# Patient Record
Sex: FEMALE | Race: ASIAN | NOT HISPANIC OR LATINO | Employment: UNEMPLOYED | URBAN - METROPOLITAN AREA
[De-identification: names, ages, dates, MRNs, and addresses within clinical notes are randomized per-mention and may not be internally consistent; named-entity substitution may affect disease eponyms.]

---

## 2018-06-11 ENCOUNTER — OFFICE VISIT - HEALTHEAST (OUTPATIENT)
Dept: FAMILY MEDICINE | Facility: CLINIC | Age: 58
End: 2018-06-11

## 2018-06-11 DIAGNOSIS — M54.50 CHRONIC RIGHT-SIDED LOW BACK PAIN WITHOUT SCIATICA: ICD-10-CM

## 2018-06-11 DIAGNOSIS — G89.29 CHRONIC RIGHT-SIDED LOW BACK PAIN WITHOUT SCIATICA: ICD-10-CM

## 2018-06-11 DIAGNOSIS — N12 PYELONEPHRITIS: ICD-10-CM

## 2018-06-11 LAB
ALBUMIN UR-MCNC: ABNORMAL MG/DL
APPEARANCE UR: CLEAR
BACTERIA #/AREA URNS HPF: ABNORMAL HPF
BILIRUB UR QL STRIP: NEGATIVE
COLOR UR AUTO: YELLOW
GLUCOSE UR STRIP-MCNC: NEGATIVE MG/DL
HGB UR QL STRIP: ABNORMAL
KETONES UR STRIP-MCNC: NEGATIVE MG/DL
LEUKOCYTE ESTERASE UR QL STRIP: ABNORMAL
NITRATE UR QL: NEGATIVE
PH UR STRIP: 5.5 [PH] (ref 5–8)
RBC #/AREA URNS AUTO: ABNORMAL HPF
SP GR UR STRIP: 1.01 (ref 1–1.03)
SQUAMOUS #/AREA URNS AUTO: ABNORMAL LPF
UROBILINOGEN UR STRIP-ACNC: ABNORMAL
WBC #/AREA URNS AUTO: ABNORMAL HPF

## 2018-06-11 ASSESSMENT — MIFFLIN-ST. JEOR: SCORE: 1285.75

## 2018-06-13 LAB — BACTERIA SPEC CULT: ABNORMAL

## 2018-08-08 ENCOUNTER — OFFICE VISIT - HEALTHEAST (OUTPATIENT)
Dept: FAMILY MEDICINE | Facility: CLINIC | Age: 58
End: 2018-08-08

## 2018-08-08 DIAGNOSIS — B02.9 SHINGLES: ICD-10-CM

## 2019-11-20 ENCOUNTER — OFFICE VISIT - HEALTHEAST (OUTPATIENT)
Dept: FAMILY MEDICINE | Facility: CLINIC | Age: 59
End: 2019-11-20

## 2019-11-20 DIAGNOSIS — N30.01 ACUTE CYSTITIS WITH HEMATURIA: ICD-10-CM

## 2019-11-20 LAB
ALBUMIN UR-MCNC: ABNORMAL MG/DL
APPEARANCE UR: ABNORMAL
BACTERIA #/AREA URNS HPF: ABNORMAL HPF
BILIRUB UR QL STRIP: NEGATIVE
COLOR UR AUTO: YELLOW
GLUCOSE UR STRIP-MCNC: NEGATIVE MG/DL
HGB UR QL STRIP: ABNORMAL
KETONES UR STRIP-MCNC: NEGATIVE MG/DL
LEUKOCYTE ESTERASE UR QL STRIP: ABNORMAL
NITRATE UR QL: NEGATIVE
PH UR STRIP: 5.5 [PH] (ref 5–8)
RBC #/AREA URNS AUTO: ABNORMAL HPF
SP GR UR STRIP: 1.01 (ref 1–1.03)
SQUAMOUS #/AREA URNS AUTO: ABNORMAL LPF
UROBILINOGEN UR STRIP-ACNC: ABNORMAL
WBC #/AREA URNS AUTO: ABNORMAL HPF

## 2019-11-20 RX ORDER — FLUOXETINE 40 MG/1
CAPSULE ORAL
Refills: 5 | Status: SHIPPED | COMMUNITY
Start: 2019-10-14 | End: 2024-04-14

## 2019-11-20 RX ORDER — TRAZODONE HYDROCHLORIDE 50 MG/1
TABLET, FILM COATED ORAL
Refills: 5 | Status: SHIPPED | COMMUNITY
Start: 2019-10-14 | End: 2024-04-14

## 2019-11-20 RX ORDER — LISINOPRIL 10 MG/1
TABLET ORAL
Refills: 1 | Status: SHIPPED | COMMUNITY
Start: 2019-10-14 | End: 2024-04-14

## 2019-11-23 ENCOUNTER — COMMUNICATION - HEALTHEAST (OUTPATIENT)
Dept: NURSING | Facility: CLINIC | Age: 59
End: 2019-11-23

## 2019-11-23 LAB
BACTERIA SPEC CULT: ABNORMAL
BACTERIA SPEC CULT: ABNORMAL

## 2020-02-17 ENCOUNTER — OFFICE VISIT - HEALTHEAST (OUTPATIENT)
Dept: FAMILY MEDICINE | Facility: CLINIC | Age: 60
End: 2020-02-17

## 2020-02-17 DIAGNOSIS — R05.9 COUGH: ICD-10-CM

## 2020-02-17 DIAGNOSIS — J02.0 ACUTE STREPTOCOCCAL PHARYNGITIS: ICD-10-CM

## 2020-02-17 DIAGNOSIS — J02.9 SORE THROAT: ICD-10-CM

## 2020-02-17 DIAGNOSIS — J18.9 PNEUMONIA OF RIGHT MIDDLE LOBE DUE TO INFECTIOUS ORGANISM: ICD-10-CM

## 2020-02-17 LAB
DEPRECATED S PYO AG THROAT QL EIA: ABNORMAL
FLUAV AG SPEC QL IA: NORMAL
FLUBV AG SPEC QL IA: NORMAL

## 2020-02-17 RX ORDER — ACETAMINOPHEN 325 MG/1
650 TABLET ORAL EVERY 6 HOURS PRN
Qty: 100 TABLET | Refills: 0 | Status: SHIPPED | OUTPATIENT
Start: 2020-02-17 | End: 2024-04-14

## 2020-02-17 RX ORDER — IBUPROFEN 400 MG/1
400 TABLET, FILM COATED ORAL EVERY 6 HOURS PRN
Qty: 50 TABLET | Refills: 0 | Status: SHIPPED | OUTPATIENT
Start: 2020-02-17 | End: 2024-04-14

## 2020-02-17 ASSESSMENT — MIFFLIN-ST. JEOR: SCORE: 1298.34

## 2021-03-23 ENCOUNTER — OFFICE VISIT - HEALTHEAST (OUTPATIENT)
Dept: FAMILY MEDICINE | Facility: CLINIC | Age: 61
End: 2021-03-23

## 2021-03-23 DIAGNOSIS — I10 BENIGN ESSENTIAL HYPERTENSION: ICD-10-CM

## 2021-03-23 DIAGNOSIS — N30.01 ACUTE CYSTITIS WITH HEMATURIA: ICD-10-CM

## 2021-03-23 LAB
ALBUMIN UR-MCNC: ABNORMAL G/DL
APPEARANCE UR: CLEAR
BACTERIA #/AREA URNS HPF: ABNORMAL /[HPF]
BILIRUB UR QL STRIP: NEGATIVE
COLOR UR AUTO: YELLOW
GLUCOSE UR STRIP-MCNC: NEGATIVE MG/DL
HGB UR QL STRIP: ABNORMAL
KETONES UR STRIP-MCNC: NEGATIVE MG/DL
LEUKOCYTE ESTERASE UR QL STRIP: ABNORMAL
NITRATE UR QL: NEGATIVE
PH UR STRIP: 5.5 [PH] (ref 5–8)
RBC #/AREA URNS AUTO: ABNORMAL HPF
SP GR UR STRIP: 1.01 (ref 1–1.03)
SQUAMOUS #/AREA URNS AUTO: ABNORMAL LPF
UROBILINOGEN UR STRIP-ACNC: ABNORMAL
WBC #/AREA URNS AUTO: ABNORMAL HPF

## 2021-03-23 RX ORDER — METFORMIN HCL 500 MG
TABLET, EXTENDED RELEASE 24 HR ORAL
Status: SHIPPED | COMMUNITY
Start: 2021-03-11 | End: 2024-04-14

## 2021-03-26 LAB
BACTERIA SPEC CULT: ABNORMAL
BACTERIA SPEC CULT: ABNORMAL

## 2021-05-27 VITALS
TEMPERATURE: 97.8 F | HEART RATE: 81 BPM | SYSTOLIC BLOOD PRESSURE: 167 MMHG | DIASTOLIC BLOOD PRESSURE: 102 MMHG | RESPIRATION RATE: 18 BRPM | OXYGEN SATURATION: 95 %

## 2021-06-01 VITALS — BODY MASS INDEX: 36 KG/M2 | HEIGHT: 59 IN | WEIGHT: 178.6 LBS

## 2021-06-01 VITALS — WEIGHT: 179 LBS | BODY MASS INDEX: 36.15 KG/M2

## 2021-06-03 VITALS
DIASTOLIC BLOOD PRESSURE: 93 MMHG | OXYGEN SATURATION: 94 % | HEART RATE: 71 BPM | WEIGHT: 176.5 LBS | SYSTOLIC BLOOD PRESSURE: 147 MMHG | TEMPERATURE: 97.9 F | BODY MASS INDEX: 35.65 KG/M2 | RESPIRATION RATE: 12 BRPM

## 2021-06-03 NOTE — TELEPHONE ENCOUNTER
Call and spoke with patient and message was given. No questions.    FEDERICO Alcantara   11:22 AM

## 2021-06-03 NOTE — PROGRESS NOTES
"  Assessment:       1. Acute cystitis with hematuria  Urinalysis-UC if Indicated    Culture, Urine    sulfamethoxazole-trimethoprim (SEPTRA DS) 800-160 mg per tablet     Medical Decision Making  Patient presents with dysuria consistent with acute cystitis.  UA did show signs of a urinary tract infection.  No signs of pyelonephritis given no fevers and no CVA tenderness.      Plan:       Oral antibiotics per orders.  Recommended plenty of fluids.  Discussed signs of worsening symptoms and when to follow-up with PCP if no symptom improvement.      Patient Instructions   Analysis of your urine showed signs of a urinary tract infection.     Take the prescribed antibiotics for the entire course, even if symptoms improve.  You should expect improvement to begin in 24 hours. In the meantime, continue to drink plenty of fluids.  Recommend daily use of a probiotic while taking prescribed medication (a common brand is Culturelle, yogurt with \"active cultures\" are also appropriate).    Reasons to come back for re-evaluation:  - Develop a fever, back or flank pain, or nausea and vomiting  - If symptoms have not completely improved after 72 hours  - Recurrent symptoms within a few weeks after treatment has concluded      Subjective:       History provided by the patient.  Also using a phone .  José Luis Bocanegra is a 59 y.o. female here for evaluation of dysuria.  Onset of symptoms was 1 to 2 weeks ago and have been unchanged since then.  Associated symptoms include urinary frequency, hematuria, and suprapubic pressure.  Patient otherwise denies fevers, nausea, and vomiting.  She has no history of urinary tract infections.    The following portions of the patient's history were reviewed and updated as appropriate: allergies, current medications and problem list.    Review of Systems  Pertinent items are noted in HPI.     Allergies  No Known Allergies    No family history on file.    Social History     Socioeconomic History     " Marital status:      Spouse name: None     Number of children: None     Years of education: None     Highest education level: None   Occupational History     None   Social Needs     Financial resource strain: None     Food insecurity:     Worry: None     Inability: None     Transportation needs:     Medical: None     Non-medical: None   Tobacco Use     Smoking status: Never Smoker     Smokeless tobacco: Never Used   Substance and Sexual Activity     Alcohol use: None     Drug use: None     Sexual activity: None   Lifestyle     Physical activity:     Days per week: None     Minutes per session: None     Stress: None   Relationships     Social connections:     Talks on phone: None     Gets together: None     Attends Restoration service: None     Active member of club or organization: None     Attends meetings of clubs or organizations: None     Relationship status: None     Intimate partner violence:     Fear of current or ex partner: None     Emotionally abused: None     Physically abused: None     Forced sexual activity: None   Other Topics Concern     None   Social History Narrative     None         Objective:       BP (!) 147/93   Pulse 71   Temp 97.9  F (36.6  C) (Oral)   Resp 12   Wt 176 lb 8 oz (80.1 kg)   SpO2 94%   Breastfeeding No   BMI 35.65 kg/m    General appearance: alert, appears stated age, cooperative, no distress and non-toxic  Back: No CVA tenderness  Lungs: clear to auscultation bilaterally  Heart: regular rate and rhythm, S1, S2 normal, no murmur, click, rub or gallop  Abdomen: Tenderness over the suprapubic region, otherwise normal bowel sounds, abdomen soft, no masses organomegaly  Skin: Skin color, texture, turgor normal. No rashes or lesions     Lab Results    Recent Results (from the past 24 hour(s))   Urinalysis-UC if Indicated   Result Value Ref Range    Color, UA Yellow Colorless, Yellow, Straw, Light Yellow    Clarity, UA Slightly Cloudy (!) Clear    Glucose, UA Negative  Negative    Bilirubin, UA Negative Negative    Ketones, UA Negative Negative    Specific Gravity, UA 1.015 1.005 - 1.030    Blood, UA Moderate (!) Negative    pH, UA 5.5 5.0 - 8.0    Protein, UA 30 mg/dL (!) Negative mg/dL    Urobilinogen, UA 0.2 E.U./dL 0.2 E.U./dL, 1.0 E.U./dL    Nitrite, UA Negative Negative    Leukocytes, UA Small (!) Negative    Bacteria, UA Few (!) None Seen hpf    RBC, UA 10-25 (!) None Seen, 0-2 hpf    WBC, UA 5-10 (!) None Seen, 0-5 hpf    Squam Epithel, UA 5-10 (!) None Seen, 0-5 lpf     I personally reviewed these results and discussed findings with the patient.

## 2021-06-03 NOTE — TELEPHONE ENCOUNTER
----- Message from Jace King PA-C sent at 11/23/2019  8:01 AM CST -----  CA to notify: Urine culture was positive. The antibiotic you were prescribed, trimethoprim-sulfamethoxazole, will work for this. No change needed.

## 2021-06-04 VITALS
HEIGHT: 59 IN | SYSTOLIC BLOOD PRESSURE: 125 MMHG | HEART RATE: 72 BPM | OXYGEN SATURATION: 92 % | RESPIRATION RATE: 16 BRPM | DIASTOLIC BLOOD PRESSURE: 83 MMHG | WEIGHT: 181.38 LBS | TEMPERATURE: 98.2 F | BODY MASS INDEX: 36.56 KG/M2

## 2021-06-06 NOTE — PROGRESS NOTES
[facility] Note                                                        Date of Visit: 2/17/2020     Chief Complaint   José Luis Bocanegra is a(n) 59 y.o.  female who presents to [facility], accompanied by her , with the following complaint(s):  Wheezing (x4d, cough, chills, cough is worse at night)       Assessment and Plan   1. Pneumonia of right middle lobe due to infectious organism (H)  - amoxicillin (AMOXIL) 500 MG capsule; Take 2 capsules (1,000 mg total) by mouth 3 (three) times a day for 10 days. Take with food.  Dispense: 60 capsule; Refill: 0  - azithromycin (ZITHROMAX Z-CJ) 250 MG tablet; Take 2 tablets (500 mg) on  Day 1,  followed by 1 tablet (250 mg) once daily on Days 2 through 5.  Dispense: 6 tablet; Refill: 0  - ibuprofen (IBU) 400 MG tablet; Take 1 tablet (400 mg total) by mouth every 6 (six) hours as needed for pain or fever.  Dispense: 50 tablet; Refill: 0  - acetaminophen (TYLENOL) 325 MG tablet; Take 2 tablets (650 mg total) by mouth every 6 (six) hours as needed for pain or fever.  Dispense: 100 tablet; Refill: 0    2. Acute streptococcal pharyngitis  - amoxicillin (AMOXIL) 500 MG capsule; Take 2 capsules (1,000 mg total) by mouth 3 (three) times a day for 10 days. Take with food.  Dispense: 60 capsule; Refill: 0  - ibuprofen (IBU) 400 MG tablet; Take 1 tablet (400 mg total) by mouth every 6 (six) hours as needed for pain or fever.  Dispense: 50 tablet; Refill: 0  - acetaminophen (TYLENOL) 325 MG tablet; Take 2 tablets (650 mg total) by mouth every 6 (six) hours as needed for pain or fever.  Dispense: 100 tablet; Refill: 0    3. Cough  - Influenza A/B Rapid Test- Nasal Swab  - XR Chest 2 Views; Future  - XR Chest 2 Views    4. Sore throat  - Rapid Strep A Screen-Throat      Patient with history of hypertension and GERD presenting for evaluation of 4-day history of worsening cough.  Patient is currently afebrile and hemodynamically stable, though oxygen saturation is low normal at 92% on  room air.  Examination is notable for oropharyngeal erythema and diffuse rhonchi with decreased breath sounds in the right lung base on pulmonary auscultation.  Influenza testing is negative.  Strep testing is positive.  Chest x-ray completed to evaluate for pneumonia due to low normal oxygen saturation and abnormal lung sounds.  Chest x-ray confirms right middle lobe pneumonia.  Treating community-acquired pneumonia and strep pharyngitis with high-dose amoxicillin and azithromycin as listed above.  Recommended use of a probiotic while taking these antibiotics.  Reviewed use of acetaminophen and/or ibuprofen as needed for discomfort as well as guaifenesin/dextromethorphan as needed for cough.  Prescriptions for acetaminophen and ibuprofen provided per patient request.  Instructed patient to discard her toothbrush 48 hours after starting antibiotic therapy to prevent reinfection with strep.    Counseled patient and her  regarding assessment and plan for evaluation and treatment. Questions were answered. See AVS for the specific written instructions and educational handout(s) regarding pneumonia and strep pharyngitis that were provided at the conclusion of the visit.     Discussed signs / symptoms that warrant urgent / emergent medical attention.     Follow up with Primary Care for recheck in 3 days.     History of Present Illness   Primary symptom: Cough  Onset: 4 days ago  Progression: Worsening  Description of cough: Dry, wet at times  Sputum production: Yes, small amount at times, white  Hemoptysis: No  Shortness of breath: Yes  Chest wall pain: Yes, central anterior, with coughing only.   Upper respiratory symptoms: Has a sore throat. Denies nasal congestion and rhinorrhea.   Fevers: Yes, not measured  Chills: No  Additional symptoms: None  Home therapies utilized: None  Underlying lung disease: No     Review of Systems   Review of Systems   All other systems reviewed and are negative.       Physical Exam  "  Vitals:    02/17/20 1233   BP: 125/83   Patient Site: Right Arm   Patient Position: Sitting   Cuff Size: Adult Regular   Pulse: 72   Resp: 16   Temp: 98.2  F (36.8  C)   TempSrc: Oral   SpO2: 92%   Weight: 181 lb 6 oz (82.3 kg)   Height: 4' 11\" (1.499 m)     Physical Exam  Vitals signs and nursing note reviewed.   Constitutional:       General: She is not in acute distress.     Appearance: She is well-developed and overweight. She is not ill-appearing or toxic-appearing.   HENT:      Head: Normocephalic and atraumatic.      Right Ear: Tympanic membrane, ear canal and external ear normal.      Left Ear: Tympanic membrane, ear canal and external ear normal.      Nose: No mucosal edema or rhinorrhea.      Mouth/Throat:      Mouth: Mucous membranes are moist. No oral lesions.      Pharynx: Uvula midline. Posterior oropharyngeal erythema present. No oropharyngeal exudate.      Tonsils: No tonsillar exudate. 1+ on the right. 1+ on the left.   Eyes:      General: Lids are normal.      Conjunctiva/sclera: Conjunctivae normal.   Neck:      Musculoskeletal: Neck supple. No edema or erythema.   Cardiovascular:      Rate and Rhythm: Normal rate and regular rhythm.      Heart sounds: S1 normal and S2 normal. No murmur. No friction rub. No gallop.    Pulmonary:      Effort: Pulmonary effort is normal.      Breath sounds: No stridor. Examination of the right-lower field reveals decreased breath sounds. Decreased breath sounds and rhonchi (diffuse) present. No wheezing or rales.   Musculoskeletal:      Right lower leg: No edema.      Left lower leg: No edema.   Lymphadenopathy:      Cervical: No cervical adenopathy.   Skin:     General: Skin is warm and dry.      Coloration: Skin is not pale.      Findings: No rash.   Neurological:      General: No focal deficit present.      Mental Status: She is alert and oriented to person, place, and time.          Diagnostic Studies   Laboratory:  Results for orders placed or performed in " visit on 02/17/20   Influenza A/B Rapid Test- Nasal Swab   Result Value Ref Range    Influenza  A, Rapid Antigen No Influenza A antigen detected No Influenza A antigen detected    Influenza B, Rapid Antigen No Influenza B antigen detected No Influenza B antigen detected   Rapid Strep A Screen-Throat   Result Value Ref Range    Rapid Strep A Antigen Group A Strep detected (!) No Group A Strep detected, presumptive negative     Radiology:  EXAM DATE:         02/17/2020  EXAM: X-RAY CHEST, 2 VIEWS, FRONTAL AND LATERAL  LOCATION: [facility]  DATE/TIME: 2/17/2020 2:00 PM  INDICATION: Cough, fever, diffuse rhonchi, decreased breath sounds in the right base  COMPARISON: 2/14/2016.  IMPRESSION: There is blunting of the right lateral costophrenic angle consistent  with small right pleural effusion or pleural scarring. There is cardiac  enlargement with mild pulmonary vascular congestion. There is subtle increased  density at the right lung base in the region of the right middle lobe with  silhouetting of the right heart border consistent with right middle lobe  pneumonia versus asymmetric pulmonary edema.  NOTE: ABNORMAL REPORT  THE DICTATION ABOVE DESCRIBES AN ABNORMALITY FOR WHICH FOLLOW-UP IS NEEDED.    Electrocardiogram:  N/A     Procedure Note   N/A     Pertinent History   The following portions of the patient's history were reviewed and updated as appropriate: allergies, current medications, past family history, past medical history, past social history, past surgical history and problem list.    Patient has Benign essential hypertension; Other insomnia; and GERD (gastroesophageal reflux disease) on their problem list.    Patient has a past medical history of Benign essential hypertension (2/20/2020), GERD (gastroesophageal reflux disease) (2/20/2020), and Other insomnia (2/20/2020).    Patient has no past surgical history on file.    Patient's family history is not pertinent.     Patient reports that  she has never smoked. She has never used smokeless tobacco.     Due to language barrier, a phone  was utilized for the duration of this patient's encounter.     Portions of this note have been dictated using voice recognition software. Any grammatical or contextual distortions are unintentional and inherent to the software.    Robert Valenzuela MD  [facility]

## 2021-06-16 PROBLEM — G47.09 OTHER INSOMNIA: Status: ACTIVE | Noted: 2020-02-20

## 2021-06-16 PROBLEM — I10 BENIGN ESSENTIAL HYPERTENSION: Status: ACTIVE | Noted: 2020-02-20

## 2021-06-16 PROBLEM — K21.9 GERD (GASTROESOPHAGEAL REFLUX DISEASE): Status: ACTIVE | Noted: 2020-02-20

## 2021-06-16 NOTE — PROGRESS NOTES
Chief Complaint   Patient presents with     Hematuria     x 4-5 days, no pain or burning, slight back pain, no frequent or urgency to urinate     HPI: José Luis Bocanegra is a 60 y.o. female who presents for evaluation of hematuria onset 4 days ago. No treatments tried. She also has some mild intermittent low back pain that occurs when sitting. It does not radiate. Patient reports no urinary frequency/urgency, dysuria, vaginal discharge, fever/chills, genital sores, abdominal pain, flank pain, nausea/vomiting, bowel/bladder incontinence, saddle anesthesia, or any other symptoms.    Problem List:  2020-02: Benign essential hypertension  2020-02: Other insomnia  2020-02: GERD (gastroesophageal reflux disease)      Vitals:    03/23/21 0758   BP: (!) 167/102   Patient Site: Right Arm   Patient Position: Sitting   Cuff Size: Adult Regular   Pulse: 81   Resp: 18   Temp: 97.8  F (36.6  C)   TempSrc: Oral   SpO2: 95%       Physical Exam   Constitutional: She appears well-developed and well-nourished.   Pulmonary/Chest: Effort normal.   Abdominal: Normal appearance. There is no abdominal tenderness. There is no CVA tenderness.   Musculoskeletal:      Lumbar back: Normal.   Neurological: She is alert.   Psychiatric: She has a normal mood and affect.         Labs:  Recent Results (from the past 24 hour(s))   Urinalysis-UC if Indicated   Result Value Ref Range    Color, UA Yellow Colorless, Yellow, Straw, Light Yellow    Clarity, UA Clear Clear    Glucose, UA Negative Negative    Protein,  mg/dL (!) Negative    Bilirubin, UA Negative Negative    Urobilinogen, UA 0.2 E.U./dL 0.2 E.U./dL, 1.0 E.U./dL    pH, UA 5.5 5.0 - 8.0    Blood, UA Large (!) Negative    Ketones, UA Negative Negative    Nitrite, UA Negative Negative    Leukocytes, UA Small (!) Negative    Specific Gravity, UA 1.010 1.005 - 1.030    RBC, UA  (!) None Seen, 0-2 hpf    WBC, UA 5-10 (!) None Seen, 0-5 hpf    Bacteria, UA Moderate (!) None Seen    Squam Epithel,  UA 0-5 None Seen, 0-5 lpf       Radiology:  No results found.        Clinical Decision Making:    Urinalysis consistent with UTI; patient afebrile and no s/sx of pyelonephritis. Will prescribe antibiotic.  Culture pending. Back pain not related to movement or reproducible on palpation, suspect due to cystitis.  BP elevated today, hx HTN. She has not taken her BP meds today.  See patient instructions below.  At the end of the encounter, I discussed results, diagnosis, medications. Discussed red flags for immediate return to clinic/ER, as well as indications for follow up if no improvement. Patient understood and agreed to plan. Patient was stable for discharge.    1. Acute cystitis with hematuria  Urinalysis-UC if Indicated    Culture, Urine    sulfamethoxazole-trimethoprim (BACTRIM DS) 800-160 mg per tablet   2. Benign essential hypertension           Return in about 3 days (around 3/26/2021) for Follow up w/ primary care provider if not improved.    ADWOA Lechuga, PAMANOLO  [facility]    Patient Instructions   1. Increase fluid intake  2. Complete antibiotic regimen as prescribed. You will be notified if the treatment plan needs to be changed based on the urine culture results.   3. For the discomfort: You may take an over the counter medication called pyridium (AZO) up three times daily for up to 2 days.  4. Follow up if you have a fever of 100.4 F (38 C) or higher, no improvement after three days of treatment, trouble urinating because of pain,new or increased discharge from the vagina, rash or joint pain, Increased back or abdominal pain.      Take your blood pressure medication when you get home.

## 2021-06-16 NOTE — PATIENT INSTRUCTIONS - HE
1. Increase fluid intake  2. Complete antibiotic regimen as prescribed. You will be notified if the treatment plan needs to be changed based on the urine culture results.   3. For the discomfort: You may take an over the counter medication called pyridium (AZO) up three times daily for up to 2 days.  4. Follow up if you have a fever of 100.4 F (38 C) or higher, no improvement after three days of treatment, trouble urinating because of pain,new or increased discharge from the vagina, rash or joint pain, Increased back or abdominal pain.      Take your blood pressure medication when you get home.

## 2021-06-18 NOTE — PATIENT INSTRUCTIONS - HE
Patient Instructions by Robert Valenzuela MD at 2/17/2020 11:20 AM     Author: Robert Valenzuela MD Service: -- Author Type: Physician    Filed: 2/17/2020  2:13 PM Encounter Date: 2/17/2020 Status: Addendum    : Robert Valenzuela MD (Physician)    Related Notes: Original Note by Robert Valenzuela MD (Physician) filed at 2/17/2020  2:04 PM       -Rapid strep test is positive.  -Chest x-ray shows right middle lobe pneumonia.  -Complete the full course of both amoxicillin and azithromycin as prescribed to treat strep and pneumonia.  -Recommend use of a probiotic while taking this antibiotic to prevent diarrhea.  This product is available over the counter.  -May use acetaminophen and / or ibuprofen as needed for pain and fever.  -Use over the counter guaifenesin / dextromethorphan as needed for cough / mucus.  -Discard your toothbrush 48 hours after starting your antibiotic to prevent reinfection with strep.  Patient Education     Pneumonia (Adult)  Pneumonia is an infection deep within the lungs. It is in the small air sacs (alveoli). Pneumonia may be caused by a virus or bacteria. Pneumonia caused by bacteria is usually treated with an antibiotic. Severe cases may need to be treated in the hospital. Milder cases can be treated at home. Symptoms usually start to get better during the first 2 days of treatment.    Home care  Follow these guidelines when caring for yourself at home:    Rest at home for the first 2 to 3 days, or until you feel stronger. Dont let yourself get overly tired when you go back to your activities.    Stay away from cigarette smoke - yours or other peoples.    You may use acetaminophen or ibuprofen to control fever or pain, unless another medicine was prescribed. If you have chronic liver or kidney disease, talk with your healthcare provider before using these medicines. Also talk with your provider if youve had a stomach ulcer or gastrointestinal bleeding. Dont give aspirin to  anyone younger than 18 years of age who is ill with a fever. It may cause severe liver damage.    Your appetite may be poor, so a light diet is fine.    Drink 6 to 8 glasses of fluids every day to make sure you are getting enough fluids. Beverages can include water, sport drinks, sodas without caffeine, juices, tea, or soup. Fluids will help loosen secretions in the lung. This will make it easier for you to cough up the phlegm (sputum). If you also have heart or kidney disease, check with your healthcare provider before you drink extra fluids.    Take antibiotic medicine prescribed until it is all gone, even if you are feeling better after a few days.  Follow-up care  Follow up with your healthcare provider in the next 2 to 3 days, or as advised. This is to be sure the medicine is helping you get better.  If you are 65 or older, you should get a pneumococcal vaccine and a yearly flu (influenza) shot. You should also get these vaccines if you have chronic lung disease like asthma, emphysema, or COPD. Recently, a second type of pneumonia vaccine has become available for everyone over 65 years old. This is in addition to the previous vaccine. Ask your provider about this.  When to seek medical advice  Call your healthcare provider right away if any of these occur:    You dont get better within the first 48 hours of treatment    Shortness of breath gets worse    Rapid breathing (more than 25 breaths per minute)    Coughing up blood    Chest pain gets worse with breathing    Fever of 100.4 F (38 C) or higher that doesnt get better with fever medicine    Weakness, dizziness, or fainting that gets worse    Thirst or dry mouth that gets worse    Sinus pain, headache, or a stiff neck    Chest pain not caused by coughing  Date Last Reviewed: 1/1/2017 2000-2017 [employer]. [address] [place] [address], [place] [address]. All rights reserved. This information is not intended as a substitute for professional medical  care. Always follow your healthcare professional's instructions.           Patient Education     Pharyngitis: Strep (Confirmed)    You have had a positive test for strep throat. Strep throat is a contagious illness. It is spread by coughing, kissing or by touching others after touching your mouth or nose. Symptoms include throat pain that is worse with swallowing, aching all over, headache, and fever. It is treated with antibiotic medicine. This should help you start to feel better in 1 to 2 days.  Home care    Rest at home. Drink plenty of fluids to you won't get dehydrated.    No work or school for the first 2 days of taking the antibiotics. After this time, you will not be contagious. You can then return to school or work if you are feeling better.     Take antibiotic medicine for the full 10 days, even if you feel better. This is very important to ensure the infection is treated. It is also important to prevent medicine-resistant germs from developing. If you were given an antibiotic shot, you don't need any more antibiotics.    You may use acetaminophen or ibuprofen to control pain or fever, unless another medicine was prescribed for this. Talk with your healthcare provider before taking these medicines if you have chronic liver or kidney disease. Also talk with your healthcare provider if you have had a stomach ulcer or GI bleeding.    Throat lozenges or sprays help reduce pain. Gargling with warm saltwater will also reduce throat pain. Dissolve 1/2 teaspoon of salt in 1 glass of warm water. This may be useful just before meals.     Soft foods are OK. Don't eat salty or spicy foods.  Follow-up care  Follow up with your healthcare provider or our staff if you don't get better over the next week.  When to seek medical advice  Call your healthcare provider right away if any of these occur:    Fever of 100.4 F (38 C) or higher, or as directed by your healthcare provider    New or worsening ear pain, sinus pain, or  headache    Painful lumps in the back of neck    Stiff neck    Lymph nodes getting larger or becoming soft in the middle    You can't swallow liquids or you can't open your mouth wide because of throat pain    Signs of dehydration. These include very dark urine or no urine, sunken eyes, and dizziness.    Trouble breathing or noisy breathing    Muffled voice    Rash  Prevention  Here are steps you can take to help prevent an infection:    Keep good hand washing habits.    Dont have close contact with people who have sore throats, colds, or other upper respiratory infections.    Dont smoke, and stay away from secondhand smoke.  Date Last Reviewed: 11/1/2017 2000-2017 [employer] [address] [place] [address], [place] [address]. All rights reserved. This information is not intended as a substitute for professional medical care. Always follow your healthcare professional's instructions.

## 2021-06-19 NOTE — PROGRESS NOTES
Chief Complaint   Patient presents with     poss shingle     to the left side abdomen to the back area. has been there for 1x week. pt state it started to itch the first 2x days, now it's like a burning feeling to the touch        HPI    Patient is here for a week of rash at left side of back, and left side of abdomen, with itching initially but now there is only moderate burning pain and no more itching. No new lesions forming per patient. No fever, chills. No recent unusual contacts nor exposures.     ROS: Pertinent ROS noted in HPI.     No Known Allergies    There is no problem list on file for this patient.      No family history on file.    Social History     Social History     Marital status:      Spouse name: N/A     Number of children: N/A     Years of education: N/A     Occupational History     Not on file.     Social History Main Topics     Smoking status: Never Smoker     Smokeless tobacco: Never Used     Alcohol use Not on file     Drug use: Not on file     Sexual activity: Not on file     Other Topics Concern     Not on file     Social History Narrative         Objective:    Vitals:    08/08/18 0755   BP: 122/64   Pulse: 63   Resp: 16   Temp: 98.3  F (36.8  C)   SpO2: 93%       Gen:NAD  CV: RRR, no M, R, G  Pulm: CTAB  Skin: groups of vesicular eruptions on erythematous bases starting from left mid back going to left side of abdomen. No new lesions forming. No evidence of superimposed bacterial infections. Lesions have started to crust over.     Shingles  -     ibuprofen (ADVIL,MOTRIN) 800 MG tablet; Take 1 tablet (800 mg total) by mouth every 8 (eight) hours as needed for pain.      With the duration of the lesions and no new lesion formation, no indication for antiviral therapy. Supportive cares as directed.

## 2021-06-26 NOTE — PROGRESS NOTES
Progress Notes by Jace King PA-C at 6/11/2018  7:40 AM     Author: Jace King PA-C Service: -- Author Type: Physician Assistant    Filed: 6/11/2018  9:55 AM Encounter Date: 6/11/2018 Status: Signed    : Jace King PA-C (Physician Assistant)          Assessment and Plan     José Luis was seen today for back pain.    Diagnoses and all orders for this visit:    Pyelonephritis  -     cephalexin (KEFLEX) 500 MG capsule; Take 1 capsule (500 mg total) by mouth 2 (two) times a day for 10 days.    Chronic right-sided low back pain without sciatica  -     Urinalysis-UC if Indicated  -     Culture, Urine         HPI     Chief Complaint   Patient presents with   ? Back Pain     4 days, achy pain, worsening, low back both sides       José Luis Bocanegra is a 58 y.o. female seen today for right-sided back pain.  She has had pains in the right side of her back in the same area for about 1 year but over the last 3-4 days it is been worse.  Typically the pain is intermittent but has been constant over the last 3 or 4 days.  Pain begins below the tip of her right shoulder blade and extends down to the lower back.  She denies pain, numbness, tingling in her legs.  Denies saddle paresthesia.  Denies dysuria, polyuria, incontinence of urine, occult initiating a urine stream, bowel incontinence.  No IV drug use, no recent medical procedures, no history of cancer, hypertension, smoking.  No fevers or chills.  No recent unexpected weight loss.       Current Outpatient Prescriptions:   ?  cephalexin (KEFLEX) 500 MG capsule, Take 1 capsule (500 mg total) by mouth 2 (two) times a day for 10 days., Disp: 20 capsule, Rfl: 0     Reviewed and updated: medical history, medications and allergies.     Review of Systems     General: Denies fever, chills, fatigue.  Cardiovascular: Denies chest pain, dyspnea on exertion, palpitations.  Respiratory: Denies dyspnea, cough, wheezing.  GI: Denies nausea, vomiting, diarrhea,  "constipation.  : Denies dysuria, polyuria.  Neuro: No saddle paresthesias or numbness/tingling in legs.     Objective     Vitals:    06/11/18 0739   BP: 122/84   Patient Site: Left Arm   Patient Position: Sitting   Cuff Size: Adult Regular   Pulse: (!) 58   Resp: 14   Temp: 98.3  F (36.8  C)   TempSrc: Oral   SpO2: 96%   Weight: 178 lb 9.6 oz (81 kg)   Height: 4' 11\" (1.499 m)        Reviewed vital signs.  General: Appears calm, comfortable. Answers questions quickly and appropriately with clear speech. No apparent distress.  Skin: Pink, warm, dry.  No rash.  HENT: Normocephalic, atraumatic. No lymphadenopathy.  Neck: Supple, without lymphadenopathy or thyromegaly.  Cardiovascular: Regular rate and rhythm, clear S1/S2 without murmur, rub, or gallop.  Pulses equal in both arms and both legs.  Respiratory: Lung sounds are clear and equal bilaterally. Normal respiratory effort.  Abdomen: Soft, flat, non-tender. No rashes or lesions. No splenomegaly or hepatomegaly.  No pulsatile masses.  Back: No rashes.  No spine tenderness, deformity, step-offs.  Neuro: Memory and cognition appear normal. Normal gait.  Biceps, patellar, Achilles DTRs all normal and symmetrical.  Negative straight leg raise bilaterally.  Psych: Mood and affect appear normal.     Imaging:   No results found.    Labs:  Recent Results (from the past 24 hour(s))   Urinalysis-UC if Indicated   Result Value Ref Range    Color, UA Yellow Colorless, Yellow, Straw, Light Yellow    Clarity, UA Clear Clear    Glucose, UA Negative Negative    Bilirubin, UA Negative Negative    Ketones, UA Negative Negative    Specific Gravity, UA 1.010 1.005 - 1.030    Blood, UA Trace (!) Negative    pH, UA 5.5 5.0 - 8.0    Protein,  mg/dL (!) Negative mg/dL    Urobilinogen, UA 0.2 E.U./dL 0.2 E.U./dL, 1.0 E.U./dL    Nitrite, UA Negative Negative    Leukocytes, UA Small (!) Negative    Bacteria, UA Many (!) None Seen hpf    RBC, UA 0-2 None Seen, 0-2 hpf    WBC, UA 10-25 " (!) None Seen, 0-5 hpf    Squam Epithel, UA 5-10 (!) None Seen, 0-5 lpf        Medical Decision-Making     José Luis Bocanegra is a 58 y.o. female who presents to the clinic today for evaluation of right-sided back pain extending from below the tip of the scapula to the lower back.  No sciatic component.  Her appearance is nontoxic.  She is not tachycardic, tachypnea, or febrile.  Back pain is been intermittently present for more than a year but has been worse over the last 3-4 days.    Differential diagnosis for back pain includes muscle spasm/strain, slipped disc w/ radicular pain including sciatica, slipped disc w/ cauda equina syndrome,vertebral fracture, vertebral tumor, epidural abscess / discitis, or pyelonephritis.      I do not believe a fracture to be the source of this patient's pain as there was no preceding trauma.  Based on lack of history of trauma and lack of midline spinous tenderness, imaging of the spine was not done.      I do not believe the pain is caused by an epidural abscess as the patient denies hx of IV drug use and does not have fevers/chills and no recent procedures.      I do not believe this patient's pain is from an infiltrative vertebral tumor as the patient does not have weight loss or night sweats and no known history of cancer.      I do not believe this patient's pain represents a rupture AAA.  There is no palpable, pulsatile mass on exam and patient does not have any ABD pain.  No history of hypertension or smoking.    Patient does not have urinary symptoms or CVA tenderness to suggest pyelonephritis, however UA is positive for infection.  Given that there is an acute worsening in her back pain I think it is reasonable to treat for pyelonephritis despite the absence of urinary symptoms or CVA tenderness.    Based on history and exam, the most likely etiology of this patient's right-sided mid to low back pain is muscular pain.  Emergent MRI is not indicated as this patient does not have  "new weakness or cauda equina syndrome.  Patient has no bowel or bladder incontinence. Will treat today with NSAIDs, patient to follow up with PCP if no improvement over the next 5-7 days.  Will seek emergency care with new weakness/paresthesias, loss of continence, fever or worsening symptoms.    Reviewed red flags that would trigger a prompt return to the clinic as noted below under patient instructions.  She expressed understanding of these directions and is in agreement with the plan.     Patient Instructions     Patient Instructions   Your urine test does show signs of infection in your urine, so a likely possible cause for your worsening back pain is an infection and is moved up into your kidney.  Take the prescribed antibiotics twice a day for 10 days.  If your symptoms are not getting better, please follow-up with your regular doctor for further evaluation.      Kidney Infection (Adult Female)    An infection in one or both kidneys is called \"pyelonephritis.\" It usually happens when bacteria (or rarely, viruses, fungi, or other disease-causing organisms) get into the kidney. The bacteria (or other disease-causing organisms) can enter the kidneys from the bladder or blood traveling from other parts of the body. A kidney infection can become serious. It can cause severe illness, scarring of the kidneys, or kidney failure if not treated properly.  Common causes for this problem include:    Not keeping the genital area clean and dry, which promotes the growth of bacteria    Wiping back to front which drags bacteria from the rectum toward the urinary opening (urethra)    Wearing tight pants or underwear (this lets moisture build up in the genital area, which helps bacteria grow)    Holding urine in for long periods of time    Dehydration  Kidney infections can cause symptoms similar to a bladder infection. Symptoms include:    Pain (or burning) when urinating    Having to urinate more often than usual    Blood in " the urine (pink or red)    Abdominal pain or discomfort, usually in the lower abdomen    Pain in the side or back    Pain above the pubic bone    Fever or chills    Vomiting    Loss of appetite  Treatment is oral antibiotics, or in more severe cases, intramuscular or IV antibiotics. These are started right away and may be changed once urine culture results determine the infecting organisms. Treatment helps prevent a more serious kidney infection.  Medicines  Medicines can help in the treatment of a bladder infection:    Take antibiotics until they are used up, even if you feel better. It is important to finish them to make sure the infection is gone.    Unless another medicine was prescribed, you can use over-the-counter medicines for pain, fever, or discomfort. If you have chronic liver of kidney disease, talk with your healthcare provider before using these medicines. Also talk with your provider if you've ever had a stomach ulcer or gastrointestinal (GI) bleeding, or are taking blood thinners.  Home care  The following are general care guidelines:    Stay home from work or school. Rest in bed until your fever breaks and you are feeling better, or as advised by your healthcare provider.    Drink lots of fluid unless you must restrict fluids for other medical reasons. This will force the medicine into your urinary system and flush the bacteria out of your body. Ask your healthcare provider how much you should drink.    Don't have sex until you have finished all of your medicine and your symptoms are gone.    Don't have caffeine, alcohol, or spicy foods. These foods may irritate the kidneys and bladder.    Don't take bubble baths. Sensitivity to the chemicals in bubble baths can irritate the urethra.    Make sure you wipe from front to back after using the toilet.    Wear loose cloths and cotton underwear.  Prevention  These self-care steps can help prevent future infections:    Drink plenty of fluids to prevent  dehydration and flush out the bladder. Do this unless you must restrict fluids for other health reasons, or your healthcare provider told you not to.    Proper cleaning after going to the bathroom in important. Make sure you wipe from front to back after using the toilet.    Urinate more often. Don't try to hold urine in for a long time.    Don't wear tight-fitting pants and underwear.    Improve your diet to prevent constipation. Eat more fruits, vegetables, and fiber. Eat less junk and fatty foods. Constipation can make a urinary tract infection more likely. Talk with your healthcare provider if you have trouble with bowel movements.    Urinate right after intercourse to flush out the bladder.  Follow-up care  Follow up with your healthcare provider, or as advised. Additional testing may be needed to make sure the infection has cleared. Close follow-up and further testing is very important to find the cause and to prevent future infections.  If a urine culture was done, you will be contacted if your treatment needs to be changed. If directed, you may call to find out the results.  If you had an X-ray, CT scan, or other diagnostic test, you will be notified of any new findings that may affect your care.  Call 911  Call 911 if any of the following occur:    Trouble breathing    Fainting or loss of consciousness    Rapid or very slow heart rate    Weakness, dizziness, or fainting    Difficulty arousing or confusion  When to seek medical advice  Call your healthcare provider right away if any of these occur:    Fever 100.4 F (38 C) or higher, or as directed by your healthcare provider    Not feeling better within 1 to 2 days after starting antibiotics    Any symptom that continues after 3 days of treatment    Increasing pain in the stomach, back, side, or groin area    Repeated vomiting    Not able to take prescribed medicine due to nausea or another reason    Bloody, dark-colored, or foul smelling urine    Trouble  urinating or decreased urine output    No urine for 8 hours, no tears when crying, sunken eyes, or dry mouth  Date Last Reviewed: 10/1/2016    9850-0778 [employer]. [address] [place] [address], [place] [address]. All rights reserved. This information is not intended as a substitute for professional medical care. Always follow your healthcare professional's instructions.              Discussed benefit vs risk of medications, dosing, side effects.  Patient was able to verbalize understanding.  After visit summary was provided for patient.     Lake King PA-C

## 2022-04-01 ENCOUNTER — APPOINTMENT (OUTPATIENT)
Dept: CT IMAGING | Facility: HOSPITAL | Age: 62
End: 2022-04-01
Attending: EMERGENCY MEDICINE
Payer: MEDICAID

## 2022-04-01 ENCOUNTER — OFFICE VISIT (OUTPATIENT)
Dept: FAMILY MEDICINE | Facility: CLINIC | Age: 62
End: 2022-04-01
Payer: MEDICAID

## 2022-04-01 ENCOUNTER — HOSPITAL ENCOUNTER (EMERGENCY)
Facility: HOSPITAL | Age: 62
Discharge: HOME | End: 2022-04-01
Attending: EMERGENCY MEDICINE | Admitting: EMERGENCY MEDICINE
Payer: MEDICAID

## 2022-04-01 VITALS
SYSTOLIC BLOOD PRESSURE: 125 MMHG | OXYGEN SATURATION: 96 % | RESPIRATION RATE: 18 BRPM | TEMPERATURE: 98.2 F | DIASTOLIC BLOOD PRESSURE: 83 MMHG | WEIGHT: 181.44 LBS | HEART RATE: 86 BPM | BODY MASS INDEX: 36.65 KG/M2

## 2022-04-01 VITALS
HEART RATE: 78 BPM | BODY MASS INDEX: 31.14 KG/M2 | HEIGHT: 64 IN | TEMPERATURE: 98.6 F | OXYGEN SATURATION: 97 % | RESPIRATION RATE: 18 BRPM | SYSTOLIC BLOOD PRESSURE: 180 MMHG | DIASTOLIC BLOOD PRESSURE: 94 MMHG

## 2022-04-01 DIAGNOSIS — R19.7 NAUSEA VOMITING AND DIARRHEA: ICD-10-CM

## 2022-04-01 DIAGNOSIS — K52.9 GASTROENTERITIS: Primary | ICD-10-CM

## 2022-04-01 DIAGNOSIS — R11.2 NAUSEA VOMITING AND DIARRHEA: ICD-10-CM

## 2022-04-01 DIAGNOSIS — J90 PLEURAL EFFUSION: ICD-10-CM

## 2022-04-01 DIAGNOSIS — E66.01 MORBID OBESITY (H): ICD-10-CM

## 2022-04-01 DIAGNOSIS — K80.20 CALCULUS OF GALLBLADDER WITHOUT CHOLECYSTITIS WITHOUT OBSTRUCTION: ICD-10-CM

## 2022-04-01 DIAGNOSIS — M62.81 MUSCLE WEAKNESS (GENERALIZED): ICD-10-CM

## 2022-04-01 LAB
ALBUMIN SERPL-MCNC: 3.2 G/DL (ref 3.5–5)
ALP SERPL-CCNC: 127 U/L (ref 45–120)
ALT SERPL W P-5'-P-CCNC: 32 U/L (ref 0–45)
ANION GAP SERPL CALCULATED.3IONS-SCNC: 12 MMOL/L (ref 5–18)
AST SERPL W P-5'-P-CCNC: 38 U/L (ref 0–40)
BASOPHILS # BLD AUTO: 0 10E3/UL (ref 0–0.2)
BASOPHILS NFR BLD AUTO: 0 %
BILIRUB SERPL-MCNC: 1.1 MG/DL (ref 0–1)
BNP SERPL-MCNC: 12 PG/ML (ref 0–96)
BUN SERPL-MCNC: 25 MG/DL (ref 8–22)
CALCIUM SERPL-MCNC: 8.5 MG/DL (ref 8.5–10.5)
CHLORIDE BLD-SCNC: 105 MMOL/L (ref 98–107)
CO2 SERPL-SCNC: 23 MMOL/L (ref 22–31)
CREAT SERPL-MCNC: 1.28 MG/DL (ref 0.6–1.1)
EOSINOPHIL # BLD AUTO: 0.1 10E3/UL (ref 0–0.7)
EOSINOPHIL NFR BLD AUTO: 1 %
ERYTHROCYTE [DISTWIDTH] IN BLOOD BY AUTOMATED COUNT: 13.9 % (ref 10–15)
GFR SERPL CREATININE-BSD FRML MDRD: 47 ML/MIN/1.73M2
GLUCOSE BLD-MCNC: 104 MG/DL (ref 70–125)
HCT VFR BLD AUTO: 46 % (ref 35–47)
HGB BLD-MCNC: 15.4 G/DL (ref 11.7–15.7)
IMM GRANULOCYTES # BLD: 0 10E3/UL
IMM GRANULOCYTES NFR BLD: 1 %
LIPASE SERPL-CCNC: 36 U/L (ref 0–52)
LYMPHOCYTES # BLD AUTO: 1.7 10E3/UL (ref 0.8–5.3)
LYMPHOCYTES NFR BLD AUTO: 20 %
MCH RBC QN AUTO: 28.8 PG (ref 26.5–33)
MCHC RBC AUTO-ENTMCNC: 33.5 G/DL (ref 31.5–36.5)
MCV RBC AUTO: 86 FL (ref 78–100)
MONOCYTES # BLD AUTO: 0.6 10E3/UL (ref 0–1.3)
MONOCYTES NFR BLD AUTO: 7 %
NEUTROPHILS # BLD AUTO: 6 10E3/UL (ref 1.6–8.3)
NEUTROPHILS NFR BLD AUTO: 71 %
NRBC # BLD AUTO: 0 10E3/UL
NRBC BLD AUTO-RTO: 0 /100
PLATELET # BLD AUTO: 189 10E3/UL (ref 150–450)
POTASSIUM BLD-SCNC: 3.9 MMOL/L (ref 3.5–5)
PROT SERPL-MCNC: 7.6 G/DL (ref 6–8)
RBC # BLD AUTO: 5.35 10E6/UL (ref 3.8–5.2)
SODIUM SERPL-SCNC: 140 MMOL/L (ref 136–145)
TROPONIN I SERPL-MCNC: <0.01 NG/ML (ref 0–0.29)
WBC # BLD AUTO: 8.4 10E3/UL (ref 4–11)

## 2022-04-01 PROCEDURE — 84484 ASSAY OF TROPONIN QUANT: CPT | Performed by: EMERGENCY MEDICINE

## 2022-04-01 PROCEDURE — 99214 OFFICE O/P EST MOD 30 MIN: CPT | Performed by: PHYSICIAN ASSISTANT

## 2022-04-01 PROCEDURE — 96374 THER/PROPH/DIAG INJ IV PUSH: CPT | Mod: 59

## 2022-04-01 PROCEDURE — 83880 ASSAY OF NATRIURETIC PEPTIDE: CPT | Performed by: EMERGENCY MEDICINE

## 2022-04-01 PROCEDURE — 93005 ELECTROCARDIOGRAM TRACING: CPT | Performed by: EMERGENCY MEDICINE

## 2022-04-01 PROCEDURE — 36415 COLL VENOUS BLD VENIPUNCTURE: CPT | Performed by: EMERGENCY MEDICINE

## 2022-04-01 PROCEDURE — 74177 CT ABD & PELVIS W/CONTRAST: CPT

## 2022-04-01 PROCEDURE — 99285 EMERGENCY DEPT VISIT HI MDM: CPT | Mod: 25

## 2022-04-01 PROCEDURE — 85025 COMPLETE CBC W/AUTO DIFF WBC: CPT | Performed by: EMERGENCY MEDICINE

## 2022-04-01 PROCEDURE — 71275 CT ANGIOGRAPHY CHEST: CPT

## 2022-04-01 PROCEDURE — 83690 ASSAY OF LIPASE: CPT | Performed by: EMERGENCY MEDICINE

## 2022-04-01 PROCEDURE — 80053 COMPREHEN METABOLIC PANEL: CPT | Performed by: EMERGENCY MEDICINE

## 2022-04-01 PROCEDURE — 250N000011 HC RX IP 250 OP 636: Performed by: EMERGENCY MEDICINE

## 2022-04-01 RX ORDER — ONDANSETRON 2 MG/ML
4 INJECTION INTRAMUSCULAR; INTRAVENOUS ONCE
Status: COMPLETED | OUTPATIENT
Start: 2022-04-01 | End: 2022-04-01

## 2022-04-01 RX ORDER — IOPAMIDOL 755 MG/ML
75 INJECTION, SOLUTION INTRAVASCULAR ONCE
Status: COMPLETED | OUTPATIENT
Start: 2022-04-01 | End: 2022-04-01

## 2022-04-01 RX ORDER — ONDANSETRON 4 MG/1
4 TABLET, ORALLY DISINTEGRATING ORAL EVERY 8 HOURS PRN
Qty: 10 TABLET | Refills: 0 | Status: SHIPPED | OUTPATIENT
Start: 2022-04-01 | End: 2024-04-14

## 2022-04-01 RX ORDER — ONDANSETRON 4 MG/1
4 TABLET, ORALLY DISINTEGRATING ORAL EVERY 8 HOURS PRN
Qty: 10 TABLET | Refills: 0 | Status: SHIPPED | OUTPATIENT
Start: 2022-04-01 | End: 2022-04-01

## 2022-04-01 RX ADMIN — ONDANSETRON 4 MG: 2 INJECTION INTRAMUSCULAR; INTRAVENOUS at 21:49

## 2022-04-01 RX ADMIN — IOPAMIDOL 75 ML: 755 INJECTION, SOLUTION INTRAVENOUS at 20:55

## 2022-04-01 ASSESSMENT — ENCOUNTER SYMPTOMS
COUGH: 0
FEVER: 0
NAUSEA: 1
ABDOMINAL PAIN: 0
DIARRHEA: 1
VOMITING: 1
SHORTNESS OF BREATH: 1
FATIGUE: 1
CHILLS: 0
APPETITE CHANGE: 1

## 2022-04-01 NOTE — PROGRESS NOTES
Patient presents with:  Abdominal Pain: abd pain vomitting, diarrhea x2days - poss food poisioning unable to keep food down       Clinical Decision Making:  Patient presented nonambulatory and is having weakness vomiting and diarrhea.  I am concerned that she may need to have her electrolytes checked as well as have IV hydration to help with her symptoms and she has not had any fluid intake today and has only urinated twice.  Additionally there was no obstruction found on flat or upright x-ray.  However, incidental note of small bilateral pleural effusions were seen.  My concern is that the patient could be dehydrated and have PE based on her shortness of breath and weakness.  Patient is sent to next higher level of care for evaluation and treatment of her weakness, shortness of breath gastroenteritis and pleural effusions.  My concern is that the patient may be dehydrated and could have possible PE which would be causing her pleural effusions.  Otherwise work-up to find other cause of the pleural effusion and for the gastroenteritis to check electrolytes and have IV hydration therapy.  Patient is sent by personal vehicle with daughter who is bringing her to the emergency room.  I called and gave report to emergency room staff.      ICD-10-CM    1. Gastroenteritis  K52.9 XR Abdomen 2 Views     CANCELED: XR Abdomen 2 Views     CANCELED: XR Abdomen 2 Views   2. Pleural effusion  J90    3. Muscle weakness (generalized)  M62.81    4. Morbid obesity (H)  E66.01        Patient Instructions     You have weakness and are not able to walk.  You have been dehydrated and need further evaluation at the ER.      Patient Education     Noninfectious Gastroenteritis (Adult)    Gastroenteritis can cause nausea, vomiting, diarrhea, and cramping in the belly. This may occur from food sensitivity, inflammation of your gastrointestinal tract, medicines, stress, or other causes not related to infection. Your symptoms will usually last  from 1 to 3 days, but can last longer. Antibiotics are not effective, but simple home treatment will be helpful.  Home care  Medicine    You may use acetaminophen or NSAID medicines like ibuprofen or naproxen to control fever, unless another medicine is prescribed. (Note: If you have chronic liver or kidney disease, or ever had a stomach ulcer or gastrointestinaI bleeding, talk with your healthcare provider before using these medicines.) Aspirin should never be used in anyone under 18 years of age who is ill with a fever. It may cause severe liver damage. Don't increase your NSAID medicines if you are already taking these medicines for another condition (like arthritis). Don't use NSAIDS if you are on aspirin (such as for heart disease, or after a stroke).    If medicines for diarrhea or vomiting are prescribed, take only as directed.  General care and preventing spread of the illness    If symptoms are severe, rest at home for the next 24 hours or until you feel better.    Hand washing with soap and water is the best way to prevent the spread of infection. Wash your hands after touching anyone who is sick.    Wash your hands after using the toilet and before meals. Clean the toilet after each use.    Caffeine, tobacco, and alcohol can make your diarrhea, cramping, and pain worse.  Diet    Water and clear liquids are important so you do not get dehydrated. Drink a small amount at a time.    Don't force yourself to eat, especially if you have cramps, vomiting, or diarrhea. When you finally decide to start eating, do not eat large amounts at a time, even if you are hungry.    If you eat, avoid fatty, greasy, spicy, or fried foods.    Don't eat dairy products if you have diarrhea; they can make the diarrhea worse.  During the first 24 hours (the first full day), follow the diet below:    Beverages: Water, clear liquids, soft drinks without caffeine, like ginger ale; mineral water (plain or flavored); decaffeinated tea  and coffee.    Soups: Clear broth, consommé, and bouillon sports drinks aren't a good choice because they have too much sugar and not enough electrolytes. In this case, commercially available products called oral rehydration solutions are best.    Desserts: Plain gelatin, ice pops, and fruit juice bars  During the next 24 hours (the second day), you may add the following to the above if you have improved. If not, continue what you did the first day:    Hot cereal, plain toast, bread, rolls, crackers    Plain noodles, rice, mashed potatoes, chicken noodle or rice soup    Unsweetened canned fruit and bananas (don't eat pineapple or citrus)    Limit caffeine and chocolate. No spices or seasonings except salt.  During the next 24 hours    Gradually resume a normal diet, as you feel better and your symptoms improve.    If at any time your symptoms start getting worse, go back to clear liquids until you feel better.    Food preparation    If you have diarrhea, you should not prepare food for others. When you  prepare food for yourself, wash your hands before and after.    Wash your hands after using cutting boards, countertops, and knives that have been in contact with raw food.    Keep uncooked meats away from cooked and ready-to-eat foods.    Follow-up care  Follow up with your healthcare provider if you are not improving over the next 2 to 3 days, or as advised. If a stool (diarrhea) sample was taken, call for the results as directed.  Call 911  Call 911 if any of these occur:    Trouble breathing    Chest pain    Confusion    Severe drowsiness or trouble awakening    Seizure    Stiff neck  When to seek medical advice  Call your healthcare provider right away if any of these occur:     Increasing belly pain or constant lower right belly pain    Continued vomiting (unable to keep liquids down)    Frequent diarrhea (more than 5 times a day)    Blood in vomit or stool (black or red color)    Inability to tolerate solid  food after a few days.    Dark urine, reduced urine output    Weakness, dizziness    Drowsiness    Fever of 100.4 F (38.0 C) or higher, or as directed by your healthcare provider    New rash  [employer] last reviewed this educational content on 3/1/2018    8317-1478 [employer]. All rights reserved. This information is not intended as a substitute for professional medical care. Always follow your healthcare professional's instructions.           Patient Education     Pleural Effusion  Your healthcare provider has told you that you have pleural effusion. The pleura are 2 layers of thin, smooth tissue around the lungs and lining the chest. Pleural effusion means that you have extra fluid between the pleura. This area is called the pleural space. The pleural space often holds only a small amount of fluid. This fluid lubricates the pleura. But if too much fluid fills the space, it can make it hard or painful to breathe.   There are 2 types of pleural effusion:     Inflammatory. This is caused by a lung disease such as pneumonia or lung cancer, both of which irritates the pleura.    Noninflammatory. This is caused by abnormal fluid pressure inside the lungs. The pressure can be caused by congestive heart failure (CHF). In CHF, extra fluid collects inside the lung tissues because of a weak heart muscle. This extra fluid then leaks into the pleural space. Other causes include kidney disease, liver disease, and malnutrition.     Pleural effusion is fluid buildup between the layers of tissue that line the outside of the lungs.   What are the symptoms of pleural effusion?   Many people don't have symptoms. But the most common symptoms of either type of pleural effusion include:     Sharp pains in the chest, especially when taking a breath, coughing, or sneezing    Shortness of breath or trouble breathing    Cough    Fever  What causes pleural effusion?  Causes include:    Pneumonia    Heart failure    Liver  disease (cirrhosis)    Tumors    Pulmonary embolism    Chest injury    Lupus    Kidney failure or peritoneal dialysis    Nephrotic syndrome    Pancreatitis    Rheumatoid arthritis    Tuberculosis    Viral lung infection    Cancer    Blood clot in the lung (pulmonary embolism)    Heart surgery    Medicine reactions    Lung cancer  How is pleural effusion diagnosed?  Your healthcare provider will examine you and ask about your health history. Tests include:     Blood tests    Analysis of fluid in pleural space, chest X-ray, CT angiography, or ultrasound    Thoracoscopy  How is pleural effusion treated?  The extra fluid may be drained from the pleural space. This is done with a procedure called thoracentesis. A thin needle is used to draw out the fluid from the pleural space. In some cases, a tube is placed in the chest to drain the extra fluid. The tube will likely stay in place for a few days.   You may have other treatments, depending on the cause of your pleural effusion. If it s due to a bacterial infection, you will be given antibiotics to fight the infection. If it s caused by a heart condition, you will be given medicines and other treatment for your heart. Your healthcare provider can tell you more about the cause of your pleural effusion and your treatment choices.   What are the long-term concerns?  If untreated, pleural effusion can lead to serious health problems. These include a collapsed lung from fluid filling the pleural space.   Call 911  Call 911 if you have:     Trouble breathing    Chest pain that gets worse  When to call your healthcare provider    Call your healthcare provider right away if you have:     Continued coughing    Fever of 100.4 F (38.0 C) or higher, or as directed by your provider    New symptoms or symptoms that get worse  [employer] last reviewed this educational content on 11/1/2019 2000-2021 [employer]. All rights reserved. This information is not intended as a  substitute for professional medical care. Always follow your healthcare professional's instructions.               HPI:  José Luis Bocanegra is a 62 year old female who resents nonambulatory to the urgent care in a wheelchair who is on metformin therapy who presents today for evaluation of weakness fatigue and dehydration.  Patient states that she has had nausea vomiting diarrhea for 2 days.  Yesterday the patient has multiple loose watery stools and vomiting.  There is been no blood or mucus or bilious appearance to the vomitus or the diarrhea.  Patient has not had fever chills night sweats or abdominal pain to report.  However, the patient has not been able to eat or drink.  Patient has had nothing to drink today.  Patient has not been urinating.  She urinated one time when she woke up this morning and one other time during the afternoon.  Patient feels lightheaded and dizzy and weak.  She is not able to stand.  Patient does not have a cough and is not having pleuritic chest pain but she does have shortness of breath and dyspnea on exertion and is not able to walk.    History obtained from daughter who helps with , chart review and the patient.    Problem List:  2022-04: Morbid obesity (H)  2020-02: Benign essential hypertension  2020-02: Other insomnia  2020-02: GERD (gastroesophageal reflux disease)      Past Medical History:   Diagnosis Date     Benign essential hypertension 2/20/2020     GERD (gastroesophageal reflux disease) 2/20/2020     Other insomnia 2/20/2020       Social History     Tobacco Use     Smoking status: Never Smoker     Smokeless tobacco: Never Used   Substance Use Topics     Alcohol use: Not on file       Review of Systems  As above in HPI otherwise negative.    Vitals:    04/01/22 1623   BP: 125/83   Pulse: 86   Resp: 18   Temp: 98.2  F (36.8  C)   TempSrc: Tympanic   SpO2: 96%   Weight: 82.3 kg (181 lb 7 oz)       General: Patient is resting comfortably no acute distress is  afebrile  HEENT: Head is normocephalic atraumatic   eyes are PERRL EOMI sclera anicteric   Throat is without pharyngeal wall erythema and no exudate oral mucous membranes are moist  No cervical lymphadenopathy present  LUNGS: Clear to auscultation bilaterally  HEART: Regular rate and rhythm  Abdomen: Is distended secondary to obesity.  There is quiet bowel sounds in all 4 quadrants.  No rebound or guarding no masses no pain at McBurney's point no CVA tenderness to percussion no induration or tenderness suprapubically.  Skin: Without rash non-diaphoretic capillary refills less than 2 seconds conjunctiva is with mild pallor.    Physical Exam      Labs:  Results for orders placed or performed in visit on 04/01/22   XR Abdomen 2 Views     Status: None    Narrative    EXAM DATE:         04/01/2022    EXAM: X-RAY ABDOMEN, 2 VIEWS  LOCATION: [facility] [place]  DATE/TIME: 4/1/2022 5:30 PM    INDICATION: Vomiting and diarrhea for 2 days, abdominal pain and decreased abdominal sounds  COMPARISON: None.    IMPRESSION: Bowel gas pattern is normal. No obstruction. No free air. Probable tiny bilateral pleural effusions.                 At the end of the encounter, I discussed results, diagnosis, medications. Discussed red flags for immediate return to clinic/ER, as well as indications for follow up if no improvement. Patient understood and agreed to plan. Patient was stable for discharge.

## 2022-04-01 NOTE — PATIENT INSTRUCTIONS
You have weakness and are not able to walk.  You have been dehydrated and need further evaluation at the ER.      Patient Education     Noninfectious Gastroenteritis (Adult)    Gastroenteritis can cause nausea, vomiting, diarrhea, and cramping in the belly. This may occur from food sensitivity, inflammation of your gastrointestinal tract, medicines, stress, or other causes not related to infection. Your symptoms will usually last from 1 to 3 days, but can last longer. Antibiotics are not effective, but simple home treatment will be helpful.  Home care  Medicine    You may use acetaminophen or NSAID medicines like ibuprofen or naproxen to control fever, unless another medicine is prescribed. (Note: If you have chronic liver or kidney disease, or ever had a stomach ulcer or gastrointestinaI bleeding, talk with your healthcare provider before using these medicines.) Aspirin should never be used in anyone under 18 years of age who is ill with a fever. It may cause severe liver damage. Don't increase your NSAID medicines if you are already taking these medicines for another condition (like arthritis). Don't use NSAIDS if you are on aspirin (such as for heart disease, or after a stroke).    If medicines for diarrhea or vomiting are prescribed, take only as directed.  General care and preventing spread of the illness    If symptoms are severe, rest at home for the next 24 hours or until you feel better.    Hand washing with soap and water is the best way to prevent the spread of infection. Wash your hands after touching anyone who is sick.    Wash your hands after using the toilet and before meals. Clean the toilet after each use.    Caffeine, tobacco, and alcohol can make your diarrhea, cramping, and pain worse.  Diet    Water and clear liquids are important so you do not get dehydrated. Drink a small amount at a time.    Don't force yourself to eat, especially if you have cramps, vomiting, or diarrhea. When you finally  decide to start eating, do not eat large amounts at a time, even if you are hungry.    If you eat, avoid fatty, greasy, spicy, or fried foods.    Don't eat dairy products if you have diarrhea; they can make the diarrhea worse.  During the first 24 hours (the first full day), follow the diet below:    Beverages: Water, clear liquids, soft drinks without caffeine, like ginger ale; mineral water (plain or flavored); decaffeinated tea and coffee.    Soups: Clear broth, consommé, and bouillon sports drinks aren't a good choice because they have too much sugar and not enough electrolytes. In this case, commercially available products called oral rehydration solutions are best.    Desserts: Plain gelatin, ice pops, and fruit juice bars  During the next 24 hours (the second day), you may add the following to the above if you have improved. If not, continue what you did the first day:    Hot cereal, plain toast, bread, rolls, crackers    Plain noodles, rice, mashed potatoes, chicken noodle or rice soup    Unsweetened canned fruit and bananas (don't eat pineapple or citrus)    Limit caffeine and chocolate. No spices or seasonings except salt.  During the next 24 hours    Gradually resume a normal diet, as you feel better and your symptoms improve.    If at any time your symptoms start getting worse, go back to clear liquids until you feel better.    Food preparation    If you have diarrhea, you should not prepare food for others. When you  prepare food for yourself, wash your hands before and after.    Wash your hands after using cutting boards, countertops, and knives that have been in contact with raw food.    Keep uncooked meats away from cooked and ready-to-eat foods.    Follow-up care  Follow up with your healthcare provider if you are not improving over the next 2 to 3 days, or as advised. If a stool (diarrhea) sample was taken, call for the results as directed.  Call 911  Call 911 if any of these occur:    Trouble  breathing    Chest pain    Confusion    Severe drowsiness or trouble awakening    Seizure    Stiff neck  When to seek medical advice  Call your healthcare provider right away if any of these occur:     Increasing belly pain or constant lower right belly pain    Continued vomiting (unable to keep liquids down)    Frequent diarrhea (more than 5 times a day)    Blood in vomit or stool (black or red color)    Inability to tolerate solid food after a few days.    Dark urine, reduced urine output    Weakness, dizziness    Drowsiness    Fever of 100.4 F (38.0 C) or higher, or as directed by your healthcare provider    New rash  [employer] last reviewed this educational content on 3/1/2018    1183-0411 [employer]. All rights reserved. This information is not intended as a substitute for professional medical care. Always follow your healthcare professional's instructions.           Patient Education     Pleural Effusion  Your healthcare provider has told you that you have pleural effusion. The pleura are 2 layers of thin, smooth tissue around the lungs and lining the chest. Pleural effusion means that you have extra fluid between the pleura. This area is called the pleural space. The pleural space often holds only a small amount of fluid. This fluid lubricates the pleura. But if too much fluid fills the space, it can make it hard or painful to breathe.   There are 2 types of pleural effusion:     Inflammatory. This is caused by a lung disease such as pneumonia or lung cancer, both of which irritates the pleura.    Noninflammatory. This is caused by abnormal fluid pressure inside the lungs. The pressure can be caused by congestive heart failure (CHF). In CHF, extra fluid collects inside the lung tissues because of a weak heart muscle. This extra fluid then leaks into the pleural space. Other causes include kidney disease, liver disease, and malnutrition.     Pleural effusion is fluid buildup between the layers of  tissue that line the outside of the lungs.   What are the symptoms of pleural effusion?   Many people don't have symptoms. But the most common symptoms of either type of pleural effusion include:     Sharp pains in the chest, especially when taking a breath, coughing, or sneezing    Shortness of breath or trouble breathing    Cough    Fever  What causes pleural effusion?  Causes include:    Pneumonia    Heart failure    Liver disease (cirrhosis)    Tumors    Pulmonary embolism    Chest injury    Lupus    Kidney failure or peritoneal dialysis    Nephrotic syndrome    Pancreatitis    Rheumatoid arthritis    Tuberculosis    Viral lung infection    Cancer    Blood clot in the lung (pulmonary embolism)    Heart surgery    Medicine reactions    Lung cancer  How is pleural effusion diagnosed?  Your healthcare provider will examine you and ask about your health history. Tests include:     Blood tests    Analysis of fluid in pleural space, chest X-ray, CT angiography, or ultrasound    Thoracoscopy  How is pleural effusion treated?  The extra fluid may be drained from the pleural space. This is done with a procedure called thoracentesis. A thin needle is used to draw out the fluid from the pleural space. In some cases, a tube is placed in the chest to drain the extra fluid. The tube will likely stay in place for a few days.   You may have other treatments, depending on the cause of your pleural effusion. If it s due to a bacterial infection, you will be given antibiotics to fight the infection. If it s caused by a heart condition, you will be given medicines and other treatment for your heart. Your healthcare provider can tell you more about the cause of your pleural effusion and your treatment choices.   What are the long-term concerns?  If untreated, pleural effusion can lead to serious health problems. These include a collapsed lung from fluid filling the pleural space.   Call 911  Call 911 if you have:     Trouble  breathing    Chest pain that gets worse  When to call your healthcare provider    Call your healthcare provider right away if you have:     Continued coughing    Fever of 100.4 F (38.0 C) or higher, or as directed by your provider    New symptoms or symptoms that get worse  [employer] last reviewed this educational content on 11/1/2019 2000-2021 [employer]. All rights reserved. This information is not intended as a substitute for professional medical care. Always follow your healthcare professional's instructions.

## 2022-04-01 NOTE — ED TRIAGE NOTES
Patient presents to ED following clinic visit for fluid in the lungs, Nausea and emesis, and loose stools.   Denies fevers or cough or pain.

## 2022-04-01 NOTE — ED PROVIDER NOTES
Expected patient: Primary care called, weakness, shortness of breath, nausea vomiting.  Chest x-ray showing pleural effusions.     Stoney Hernandez MD  04/01/22 [id number]

## 2022-04-01 NOTE — ED PROVIDER NOTES
"ED Provider In Triage Note  [facility]  Encounter Date: Apr 1, 2022    Chief Complaint   Patient presents with     Fluid in lungs     Vomiting       Brief HPI:   José Luis Bocanegra is a 62 year old female presenting to the Emergency Department with a chief complaint of nausea and vomiting. Went to , AXR show tiny pleural effusions and sent here. Denies CHF history. No current chest pain. No shortness of breath currently. No cough. No fever. No one at home sick. No current abdominal pain.     Brief Physical Exam:  BP (!) 156/89   Pulse 85   Temp 98.6  F (37  C) (Oral)   Resp 16   Ht 1.626 m (5' 4\")   SpO2 94%   BMI 31.14 kg/m    General: Non-toxic appearing  HEENT: Atraumatic  Resp: No respiratory distress  Abdomen: Non-peritoneal  Neuro: Alert, oriented, answers questions appropriately  Psych: Behavior appropriate    Plan Initiated in Triage:  Orders Placed This Encounter     Chest XR,  PA & LAT     Comprehensive metabolic panel     Troponin I (now)     B-Type Natriuretic Peptide ([facility] Only)     Lipase       PIT Dispo:   Return to lobby while awaiting workup and ED bed availability    Pato Oviedo MD on 4/1/2022 at 6:41 PM    Patient was evaluated by the Physician in Triage due to a limitation of available rooms in the Emergency Department. A plan of care was discussed based on the information obtained on the initial evaluation and patient was consuled to return back to the Emergency Department lobby after this initial evalutaiton until results were obtained or a room became available in the Emergency Department. Patient was counseled not to leave prior to receiving the results of their workup.        Pato Oviedo MD  04/01/22 1842    "

## 2022-04-02 NOTE — DISCHARGE INSTRUCTIONS
You can use Zofran if needed for any further nausea; you can also use Pepto-Bismol at home if you have diarrhea.  Try to reintroduce fluids slowly and eat a bland diet.  If you develop worsening pain or new concerning symptoms, return to the ER for reevaluation.

## 2022-04-02 NOTE — ED NOTES
Pt discharged at 2228 to home ambulatory with daughter. All questions answered. Expressed understanding of info

## 2022-04-02 NOTE — ED PROVIDER NOTES
EMERGENCY DEPARTMENT ENCOUNTER      NAME: José Luis Bocanegra  AGE: 62 year old female  YOB: 1960  MRN: [id number]  EVALUATION DATE & TIME: 4/1/2022  7:26 PM    PCP: No Ref-Primary, Physician    ED PROVIDER: Stoney Hernandez M.D.      Chief Complaint   Patient presents with     Fluid in lungs     Vomiting         FINAL IMPRESSION:  1. Nausea vomiting and diarrhea    2. Calculus of gallbladder without cholecystitis without obstruction          ED COURSE & MEDICAL DECISION MAKING:    Pertinent Labs & Imaging studies reviewed. (See chart for details)  62 year old female presents to the Emergency Department for evaluation of 62-year-old female presenting with nausea and vomiting for 1 day and also some shortness of breath.  She was sent in by primary care, with concern for fluid in the lungs and possible blood clots.  Patient did not have shortness of breath on my evaluation, with normal heart rate and pulse ox.  Symptoms are much more consistent with gastroenteritis type picture that the family was initially concern for.  Abdominal exam is relatively benign.  However, given the primary care concern for PE, work-up was initiated here regarding her pleural effusion seen on x-ray and possible PE.  CT chest abdomen pelvis showing no PE; no pleural effusion; this is likely related to scar tissue from previous infection.  She does have some gallstones and possible hepatic fibrosis, likely chronic.  No tenderness in this area on exam.  She did not have any further symptoms in the ED and remained completely stable.  Plan for symptomatic care at home, discussed return precautions with the family, follow-up with primary care for further liver testing as an outpatient and cholelithiasis diagnosis.          7:44PM I met with the patient for the initial interview and physical examination. Discussed plan for treatment and workup in the ED. PPE: Provider wore gloves and surgical mask.  9:17 PM I reassessed the patient and  updated her on the results. I discussed the plan for discharge with the patient, and patient is agreeable. We discussed supportive cares at home and reasons for return to the ER including new or worsening symptoms - all questions and concerns addressed. Patient to be discharged by RN.     At the conclusion of the encounter I discussed the results of all of the tests and the disposition. The questions were answered. The patient or family acknowledged understanding and was agreeable with the care plan.        MEDICATIONS GIVEN IN THE EMERGENCY:  Medications   ondansetron (ZOFRAN) injection 4 mg (has no administration in time range)   iopamidol (ISOVUE-370) solution 75 mL (75 mLs Intravenous Given 4/1/22 2055)       NEW PRESCRIPTIONS STARTED AT TODAY'S ER VISIT  New Prescriptions    ONDANSETRON (ZOFRAN ODT) 4 MG ODT TAB    Take 1 tablet (4 mg) by mouth every 8 hours as needed for nausea           =================================================================    HPI    Patient information was obtained from: patient and daughter    Use of : Yes (patient's daughter) - Language Daphney Bocanegra is a 62 year old female with a pertinent history of GERD and hypertension who presents to this ED as a referral for evaluation of nausea, vomiting, and diarrhea.     Per chart review, patient saw her PCP on 4/1/22 (earlier today) for evaluation of similar symptoms. No obstruction found on XR, although incidental note of small bilateral pleural effusions. Patient was referred to this ED for further evaluation.     Patient reports nausea, vomiting, and diarrhea onset last night, which persisted throughout most of the day today. She was seen in clinic, found to have pleural effusions, and sent here. Endorses exertional shortness of breath, fatigue, and decreased appetite but otherwise denies abdominal pain, fever, chills, cough, leg swelling, or any other complaints at this time. No medications prior to arrival. No  known sick contacts or sick family members.     Social: Denies alcohol or tobacco use.       REVIEW OF SYSTEMS   Review of Systems   Constitutional: Positive for appetite change (decreased) and fatigue. Negative for chills and fever.   Respiratory: Positive for shortness of breath (exertional). Negative for cough.    Cardiovascular: Negative for leg swelling.   Gastrointestinal: Positive for diarrhea, nausea and vomiting (none recently). Negative for abdominal pain.   All other systems reviewed and are negative.       PAST MEDICAL HISTORY:  Past Medical History:   Diagnosis Date     Benign essential hypertension 2/20/2020     GERD (gastroesophageal reflux disease) 2/20/2020     Other insomnia 2/20/2020       PAST SURGICAL HISTORY:  History reviewed. No pertinent surgical history.        CURRENT MEDICATIONS:      Current Facility-Administered Medications:      ondansetron (ZOFRAN) injection 4 mg, 4 mg, Intravenous, Once, Stoney Hernandez MD    Current Outpatient Medications:      ondansetron (ZOFRAN ODT) 4 MG ODT tab, Take 1 tablet (4 mg) by mouth every 8 hours as needed for nausea, Disp: 10 tablet, Rfl: 0     acetaminophen (TYLENOL) 325 MG tablet, [ACETAMINOPHEN (TYLENOL) 325 MG TABLET] Take 2 tablets (650 mg total) by mouth every 6 (six) hours as needed for pain or fever. (Patient not taking: Reported on 4/1/2022), Disp: 100 tablet, Rfl: 0     FLUoxetine (PROZAC) 40 MG capsule, [FLUOXETINE (PROZAC) 40 MG CAPSULE] TAKE 2 CAPSULES BY MOUTH WEEKLY ON MONDAYS./IB ASTHIV NOJ 2 LUB TXHUA HNUB MONDAY RAWS XIOMY GARCIA, Disp: , Rfl: 5     ibuprofen (IBU) 400 MG tablet, [IBUPROFEN (IBU) 400 MG TABLET] Take 1 tablet (400 mg total) by mouth every 6 (six) hours as needed for pain or fever. (Patient not taking: Reported on 4/1/2022), Disp: 50 tablet, Rfl: 0     lisinopril (PRINIVIL,ZESTRIL) 10 MG tablet, [LISINOPRIL (PRINIVIL,ZESTRIL) 10 MG TABLET] TAKE 1 TABLET DAILY BY MOUTH FOR HIGH BLOOD PRESSURE // IB HNUB NOJ 1 LUB  "MICHELLE YUNG NTSHAV SIAB, Disp: , Rfl: 1     metFORMIN (GLUCOPHAGE-XR) 500 MG 24 hr tablet, [METFORMIN (GLUCOPHAGE-XR) 500 MG 24 HR TABLET] , Disp: , Rfl:      omeprazole (PRILOSEC) 20 MG capsule, [OMEPRAZOLE (PRILOSEC) 20 MG CAPSULE] , Disp: , Rfl:      ranitidine (ZANTAC) 300 MG tablet, [RANITIDINE (ZANTAC) 300 MG TABLET] TAKE 1 TABLET EVERY DAY FOR STOMACH // 1 HNUB NOJ 1 LUB PAB DILSHAD MOB PLAB/NCAUJ PLAB, Disp: , Rfl: 5     traZODone (DESYREL) 50 MG tablet, [TRAZODONE (DESYREL) 50 MG TABLET] TAKE 1 TABLET BY MOUTH DAILY AT BEDTIME // 1 HNUB NOJ 1 LUB THAUM MUS PW, Disp: , Rfl: 5    ALLERGIES:  No Known Allergies    FAMILY HISTORY:  History reviewed. No pertinent family history.    SOCIAL HISTORY:   Social History     Socioeconomic History     Marital status:      Spouse name: Not on file     Number of children: Not on file     Years of education: Not on file     Highest education level: Not on file   Occupational History     Not on file   Tobacco Use     Smoking status: Never Smoker     Smokeless tobacco: Never Used   Substance and Sexual Activity     Alcohol use: No alcohol     Drug use: Not on file     Sexual activity: Not on file   Other Topics Concern     Not on file   Social History Narrative     Not on file     Social Determinants of Health     Financial Resource Strain: Not on file   Food Insecurity: Not on file   Transportation Needs: Not on file   Physical Activity: Not on file   Stress: Not on file   Social Connections: Not on file   Intimate Partner Violence: Not on file   Housing Stability: Not on file       VITALS:  BP (!) 159/94   Pulse 79   Temp 98.6  F (37  C) (Oral)   Resp 16   Ht 1.626 m (5' 4\")   SpO2 94%   BMI 31.14 kg/m      PHYSICAL EXAM    Physical Exam  Constitutional:       General: She is not in acute distress.     Appearance: She is not diaphoretic.   HENT:      Head: Atraumatic.      Mouth/Throat:      Mouth: Mucous membranes are moist.      Pharynx: No oropharyngeal exudate. "   Eyes:      General: No scleral icterus.     Pupils: Pupils are equal, round, and reactive to light.   Cardiovascular:      Rate and Rhythm: Normal rate.      Heart sounds: Normal heart sounds.   Pulmonary:      Effort: No respiratory distress.      Breath sounds: Normal breath sounds.   Abdominal:      General: Bowel sounds are normal. There is no distension.      Palpations: Abdomen is soft.      Tenderness: There is no abdominal tenderness. There is no guarding or rebound.   Musculoskeletal:         General: No tenderness.      Cervical back: Normal range of motion.   Skin:     General: Skin is warm.      Findings: No rash.   Neurological:      Mental Status: She is alert. Mental status is at baseline.   Psychiatric:         Mood and Affect: Mood normal.            LAB:  All pertinent labs reviewed and interpreted.  Results for orders placed or performed during the hospital encounter of 04/01/22   CT Chest Pulmonary Embolism w Contrast    Impression    IMPRESSION:  1.  Negative for pulmonary emboli.    2.  Some chronic pleural thickening on the right side likely from previous infection explains the opacity right lung base on plain film. No pleural effusions. This findings of little significance otherwise.    3.  Gallstones.    4.  Configuration of the liver suggestive of underlying hepatic process.    5.  No other significant findings in the abdomen or pelvis.   CT Abdomen Pelvis w Contrast    Impression    IMPRESSION:  1.  Negative for pulmonary emboli.    2.  Some chronic pleural thickening on the right side likely from previous infection explains the opacity right lung base on plain film. No pleural effusions. This findings of little significance otherwise.    3.  Gallstones.    4.  Configuration of the liver suggestive of underlying hepatic process.    5.  No other significant findings in the abdomen or pelvis.   Comprehensive metabolic panel   Result Value Ref Range    Sodium 140 136 - 145 mmol/L     Potassium 3.9 3.5 - 5.0 mmol/L    Chloride 105 98 - 107 mmol/L    Carbon Dioxide (CO2) 23 22 - 31 mmol/L    Anion Gap 12 5 - 18 mmol/L    Urea Nitrogen 25 (H) 8 - 22 mg/dL    Creatinine 1.28 (H) 0.60 - 1.10 mg/dL    Calcium 8.5 8.5 - 10.5 mg/dL    Glucose 104 70 - 125 mg/dL    Alkaline Phosphatase 127 (H) 45 - 120 U/L    AST 38 0 - 40 U/L    ALT 32 0 - 45 U/L    Protein Total 7.6 6.0 - 8.0 g/dL    Albumin 3.2 (L) 3.5 - 5.0 g/dL    Bilirubin Total 1.1 (H) 0.0 - 1.0 mg/dL    GFR Estimate 47 (L) >60 mL/min/1.73m2   Troponin I (now)   Result Value Ref Range    Troponin I <0.01 0.00 - 0.29 ng/mL   B-Type Natriuretic Peptide ([facility] Only)   Result Value Ref Range    BNP 12 0 - 96 pg/mL   Result Value Ref Range    Lipase 36 0 - 52 U/L   CBC with platelets and differential   Result Value Ref Range    WBC Count 8.4 4.0 - 11.0 10e3/uL    RBC Count 5.35 (H) 3.80 - 5.20 10e6/uL    Hemoglobin 15.4 11.7 - 15.7 g/dL    Hematocrit 46.0 35.0 - 47.0 %    MCV 86 78 - 100 fL    MCH 28.8 26.5 - 33.0 pg    MCHC 33.5 31.5 - 36.5 g/dL    RDW 13.9 10.0 - 15.0 %    Platelet Count 189 150 - 450 10e3/uL    % Neutrophils 71 %    % Lymphocytes 20 %    % Monocytes 7 %    % Eosinophils 1 %    % Basophils 0 %    % Immature Granulocytes 1 %    NRBCs per 100 WBC 0 <1 /100    Absolute Neutrophils 6.0 1.6 - 8.3 10e3/uL    Absolute Lymphocytes 1.7 0.8 - 5.3 10e3/uL    Absolute Monocytes 0.6 0.0 - 1.3 10e3/uL    Absolute Eosinophils 0.1 0.0 - 0.7 10e3/uL    Absolute Basophils 0.0 0.0 - 0.2 10e3/uL    Absolute Immature Granulocytes 0.0 <=0.4 10e3/uL    Absolute NRBCs 0.0 10e3/uL       RADIOLOGY:  Reviewed all pertinent imaging. Please see official radiology report.  CT Abdomen Pelvis w Contrast   Final Result   IMPRESSION:   1.  Negative for pulmonary emboli.      2.  Some chronic pleural thickening on the right side likely from previous infection explains the opacity right lung base on plain film. No pleural effusions. This findings of little  significance otherwise.      3.  Gallstones.      4.  Configuration of the liver suggestive of underlying hepatic process.      5.  No other significant findings in the abdomen or pelvis.      CT Chest Pulmonary Embolism w Contrast   Final Result   IMPRESSION:   1.  Negative for pulmonary emboli.      2.  Some chronic pleural thickening on the right side likely from previous infection explains the opacity right lung base on plain film. No pleural effusions. This findings of little significance otherwise.      3.  Gallstones.      4.  Configuration of the liver suggestive of underlying hepatic process.      5.  No other significant findings in the abdomen or pelvis.          EKG:    Performed at: 1846    Impression: sinus, biatrial enlargement, inferior infarct age undetermined. No previous for comparison    Rate: 80  Rhythm: sinus  Axis: 4  ME Interval: 168  QRS Interval: 104  QTc Interval: 482  ST Changes: none  Comparison: no previous    I have independently reviewed and interpreted the EKG(s) documented above.       I, Mel Vance, am serving as a scribe to document services personally performed by Dr. Hernandez based on my observation and the provider's statements to me. I, Stoney Hernandez MD attest that Mel Vance is acting in a scribe capacity, has observed my performance of the services and has documented them in accordance with my direction.    Stoney Hernandez M.D.  Emergency Medicine  [facility] EMERGENCY DEPARTMENT  [address] [id number] [phone]  Dept: [phone]     Stoney Hernandez MD  04/01/22 1867

## 2022-04-02 NOTE — ED NOTES
Pt reporting nausea now. Denied previously. Zofran given. Will PO challenge. Pt requesting script be sent to pharmacy off [address]. MD updated

## 2022-04-04 LAB
ATRIAL RATE - MUSE: 80 BPM
DIASTOLIC BLOOD PRESSURE - MUSE: NORMAL MMHG
INTERPRETATION ECG - MUSE: NORMAL
P AXIS - MUSE: 47 DEGREES
PR INTERVAL - MUSE: 168 MS
QRS DURATION - MUSE: 104 MS
QT - MUSE: 418 MS
QTC - MUSE: 482 MS
R AXIS - MUSE: 4 DEGREES
SYSTOLIC BLOOD PRESSURE - MUSE: NORMAL MMHG
T AXIS - MUSE: 40 DEGREES
VENTRICULAR RATE- MUSE: 80 BPM

## 2023-01-10 ENCOUNTER — HOSPITAL ENCOUNTER (EMERGENCY)
Facility: HOSPITAL | Age: 63
Discharge: HOME | End: 2023-01-10
Attending: EMERGENCY MEDICINE | Admitting: EMERGENCY MEDICINE
Payer: MEDICAID

## 2023-01-10 ENCOUNTER — OFFICE VISIT (OUTPATIENT)
Dept: FAMILY MEDICINE | Facility: CLINIC | Age: 63
End: 2023-01-10
Payer: MEDICAID

## 2023-01-10 ENCOUNTER — APPOINTMENT (OUTPATIENT)
Dept: ULTRASOUND IMAGING | Facility: HOSPITAL | Age: 63
End: 2023-01-10
Attending: EMERGENCY MEDICINE
Payer: MEDICAID

## 2023-01-10 ENCOUNTER — APPOINTMENT (OUTPATIENT)
Dept: RADIOLOGY | Facility: HOSPITAL | Age: 63
End: 2023-01-10
Attending: EMERGENCY MEDICINE
Payer: MEDICAID

## 2023-01-10 VITALS
OXYGEN SATURATION: 97 % | BODY MASS INDEX: 32.92 KG/M2 | RESPIRATION RATE: 18 BRPM | HEART RATE: 70 BPM | WEIGHT: 191.8 LBS | TEMPERATURE: 98.5 F | DIASTOLIC BLOOD PRESSURE: 97 MMHG | SYSTOLIC BLOOD PRESSURE: 159 MMHG

## 2023-01-10 VITALS
OXYGEN SATURATION: 95 % | RESPIRATION RATE: 26 BRPM | TEMPERATURE: 98.3 F | HEART RATE: 67 BPM | BODY MASS INDEX: 59.07 KG/M2 | SYSTOLIC BLOOD PRESSURE: 186 MMHG | DIASTOLIC BLOOD PRESSURE: 87 MMHG | WEIGHT: 293 LBS | HEIGHT: 59 IN

## 2023-01-10 DIAGNOSIS — M79.604 PAIN OF RIGHT LOWER EXTREMITY: Primary | ICD-10-CM

## 2023-01-10 DIAGNOSIS — M79.89 SWELLING OF RIGHT HAND: ICD-10-CM

## 2023-01-10 DIAGNOSIS — R60.1 GENERALIZED EDEMA: ICD-10-CM

## 2023-01-10 DIAGNOSIS — R73.9 HYPERGLYCEMIA: ICD-10-CM

## 2023-01-10 DIAGNOSIS — I82.4Z1 ACUTE DEEP VEIN THROMBOSIS (DVT) OF DISTAL VEIN OF RIGHT LOWER EXTREMITY (H): ICD-10-CM

## 2023-01-10 DIAGNOSIS — N39.0 URINARY TRACT INFECTION WITHOUT HEMATURIA, SITE UNSPECIFIED: ICD-10-CM

## 2023-01-10 DIAGNOSIS — R06.02 SOB (SHORTNESS OF BREATH): ICD-10-CM

## 2023-01-10 LAB
ALBUMIN SERPL BCG-MCNC: 3.5 G/DL (ref 3.5–5.2)
ALBUMIN UR-MCNC: 100 MG/DL
ALP SERPL-CCNC: 118 U/L (ref 35–104)
ALT SERPL W P-5'-P-CCNC: 15 U/L (ref 10–35)
ANION GAP SERPL CALCULATED.3IONS-SCNC: 13 MMOL/L (ref 7–15)
APPEARANCE UR: ABNORMAL
AST SERPL W P-5'-P-CCNC: 18 U/L (ref 10–35)
BACTERIA #/AREA URNS HPF: ABNORMAL /HPF
BASOPHILS # BLD AUTO: 0 10E3/UL (ref 0–0.2)
BASOPHILS NFR BLD AUTO: 0 %
BILIRUB DIRECT SERPL-MCNC: <0.2 MG/DL (ref 0–0.3)
BILIRUB SERPL-MCNC: 0.5 MG/DL
BILIRUB UR QL STRIP: NEGATIVE
BUN SERPL-MCNC: 16.7 MG/DL (ref 8–23)
CALCIUM SERPL-MCNC: 9 MG/DL (ref 8.8–10.2)
CHLORIDE SERPL-SCNC: 104 MMOL/L (ref 98–107)
COLOR UR AUTO: YELLOW
CREAT SERPL-MCNC: 1.1 MG/DL (ref 0.51–0.95)
DEPRECATED HCO3 PLAS-SCNC: 22 MMOL/L (ref 22–29)
EOSINOPHIL # BLD AUTO: 0.1 10E3/UL (ref 0–0.7)
EOSINOPHIL NFR BLD AUTO: 0 %
ERYTHROCYTE [DISTWIDTH] IN BLOOD BY AUTOMATED COUNT: 13.2 % (ref 10–15)
GFR SERPL CREATININE-BSD FRML MDRD: 57 ML/MIN/1.73M2
GLUCOSE SERPL-MCNC: 168 MG/DL (ref 70–99)
GLUCOSE UR STRIP-MCNC: NEGATIVE MG/DL
HBA1C MFR BLD: 7.3 %
HCT VFR BLD AUTO: 44.3 % (ref 35–47)
HGB BLD-MCNC: 14.3 G/DL (ref 11.7–15.7)
HGB UR QL STRIP: ABNORMAL
IMM GRANULOCYTES # BLD: 0.1 10E3/UL
IMM GRANULOCYTES NFR BLD: 1 %
KETONES UR STRIP-MCNC: NEGATIVE MG/DL
LEUKOCYTE ESTERASE UR QL STRIP: ABNORMAL
LYMPHOCYTES # BLD AUTO: 3.2 10E3/UL (ref 0.8–5.3)
LYMPHOCYTES NFR BLD AUTO: 22 %
MCH RBC QN AUTO: 28.2 PG (ref 26.5–33)
MCHC RBC AUTO-ENTMCNC: 32.3 G/DL (ref 31.5–36.5)
MCV RBC AUTO: 87 FL (ref 78–100)
MONOCYTES # BLD AUTO: 1.3 10E3/UL (ref 0–1.3)
MONOCYTES NFR BLD AUTO: 9 %
NEUTROPHILS # BLD AUTO: 10 10E3/UL (ref 1.6–8.3)
NEUTROPHILS NFR BLD AUTO: 68 %
NITRATE UR QL: POSITIVE
NRBC # BLD AUTO: 0 10E3/UL
NRBC BLD AUTO-RTO: 0 /100
NT-PROBNP SERPL-MCNC: 74 PG/ML (ref 0–900)
PH UR STRIP: 6.5 [PH] (ref 5–7)
PLATELET # BLD AUTO: 223 10E3/UL (ref 150–450)
POTASSIUM SERPL-SCNC: 3.4 MMOL/L (ref 3.4–5.3)
PROT SERPL-MCNC: 7.6 G/DL (ref 6.4–8.3)
RBC # BLD AUTO: 5.07 10E6/UL (ref 3.8–5.2)
RBC URINE: 10 /HPF
SODIUM SERPL-SCNC: 139 MMOL/L (ref 136–145)
SODIUM UR-SCNC: 69 MMOL/L
SP GR UR STRIP: 1.02 (ref 1–1.03)
SQUAMOUS EPITHELIAL: 1 /HPF
TSH SERPL DL<=0.005 MIU/L-ACNC: 3.56 UIU/ML (ref 0.3–4.2)
UROBILINOGEN UR STRIP-MCNC: <2 MG/DL
WBC # BLD AUTO: 14.6 10E3/UL (ref 4–11)
WBC CLUMPS #/AREA URNS HPF: PRESENT /HPF
WBC URINE: 163 /HPF

## 2023-01-10 PROCEDURE — 87086 URINE CULTURE/COLONY COUNT: CPT | Performed by: EMERGENCY MEDICINE

## 2023-01-10 PROCEDURE — 80053 COMPREHEN METABOLIC PANEL: CPT | Performed by: EMERGENCY MEDICINE

## 2023-01-10 PROCEDURE — 93971 EXTREMITY STUDY: CPT | Mod: RT

## 2023-01-10 PROCEDURE — 83880 ASSAY OF NATRIURETIC PEPTIDE: CPT | Performed by: EMERGENCY MEDICINE

## 2023-01-10 PROCEDURE — 82248 BILIRUBIN DIRECT: CPT | Performed by: EMERGENCY MEDICINE

## 2023-01-10 PROCEDURE — 93005 ELECTROCARDIOGRAM TRACING: CPT | Performed by: EMERGENCY MEDICINE

## 2023-01-10 PROCEDURE — 250N000013 HC RX MED GY IP 250 OP 250 PS 637: Performed by: EMERGENCY MEDICINE

## 2023-01-10 PROCEDURE — 85025 COMPLETE CBC W/AUTO DIFF WBC: CPT | Performed by: EMERGENCY MEDICINE

## 2023-01-10 PROCEDURE — 81001 URINALYSIS AUTO W/SCOPE: CPT | Performed by: EMERGENCY MEDICINE

## 2023-01-10 PROCEDURE — 99215 OFFICE O/P EST HI 40 MIN: CPT | Performed by: PHYSICIAN ASSISTANT

## 2023-01-10 PROCEDURE — 83036 HEMOGLOBIN GLYCOSYLATED A1C: CPT | Performed by: EMERGENCY MEDICINE

## 2023-01-10 PROCEDURE — 84300 ASSAY OF URINE SODIUM: CPT | Performed by: EMERGENCY MEDICINE

## 2023-01-10 PROCEDURE — 84443 ASSAY THYROID STIM HORMONE: CPT | Performed by: EMERGENCY MEDICINE

## 2023-01-10 PROCEDURE — 71046 X-RAY EXAM CHEST 2 VIEWS: CPT

## 2023-01-10 PROCEDURE — 93970 EXTREMITY STUDY: CPT

## 2023-01-10 PROCEDURE — 36415 COLL VENOUS BLD VENIPUNCTURE: CPT | Performed by: EMERGENCY MEDICINE

## 2023-01-10 PROCEDURE — 99285 EMERGENCY DEPT VISIT HI MDM: CPT | Mod: 25

## 2023-01-10 RX ORDER — ENOXAPARIN SODIUM 300 MG/3ML
1 INJECTION INTRAVENOUS; SUBCUTANEOUS 2 TIMES DAILY
Qty: 26.74 ML | Refills: 0 | Status: SHIPPED | OUTPATIENT
Start: 2023-01-10 | End: 2023-01-10 | Stop reason: ALTCHOICE

## 2023-01-10 RX ORDER — CEPHALEXIN 500 MG/1
500 CAPSULE ORAL ONCE
Status: COMPLETED | OUTPATIENT
Start: 2023-01-10 | End: 2023-01-10

## 2023-01-10 RX ORDER — ENOXAPARIN SODIUM 150 MG/ML
1 INJECTION SUBCUTANEOUS ONCE
Status: DISCONTINUED | OUTPATIENT
Start: 2023-01-10 | End: 2023-01-10

## 2023-01-10 RX ORDER — CEPHALEXIN 500 MG/1
500 CAPSULE ORAL 4 TIMES DAILY
Qty: 28 CAPSULE | Refills: 0 | Status: SHIPPED | OUTPATIENT
Start: 2023-01-10 | End: 2023-01-17

## 2023-01-10 RX ORDER — WARFARIN SODIUM 5 MG/1
5 TABLET ORAL DAILY
Qty: 30 TABLET | Refills: 0 | Status: SHIPPED | OUTPATIENT
Start: 2023-01-10 | End: 2023-01-10 | Stop reason: ALTCHOICE

## 2023-01-10 RX ADMIN — CEPHALEXIN 500 MG: 500 CAPSULE ORAL at 14:23

## 2023-01-10 ASSESSMENT — ACTIVITIES OF DAILY LIVING (ADL)
ADLS_ACUITY_SCORE: 33
ADLS_ACUITY_SCORE: 35
ADLS_ACUITY_SCORE: 35

## 2023-01-10 NOTE — DISCHARGE INSTRUCTIONS
Your blood sugar in the ER today was high at 168. This means you COULD have diabetes. I sent a blood test in the ER to the lab and that blood test (hemoglobin a1c) will help us to better determine if you are diabetic. It is very important to follow up with the primary care doctor in the next week so they can help you to look up the results of your test result and talk to you about whether they recommend starting medicine for diabetes.     Your ultrasound does show a blood clot in your right leg.  We are starting you on an injection blood thinner as well as an oral blood thinner.  Take both of those until your primary doctor tells you can stop the injection.

## 2023-01-10 NOTE — ED TRIAGE NOTES
Patient hmong speaking, daughter with patient and helping with language. Patient has had one year history of edema in all extremities and SOB. Went to clinic today and sent to ER. States pain at extremities from edema. Right arm 3+ edema, left minimal. Edema present in lower extremities, pitting. Daughter states using crutches to ambulate because of pain, edema

## 2023-01-10 NOTE — ED PROVIDER NOTES
EMERGENCY DEPARTMENT ENCOUNTER      NAME: José Luis Bocanegra  AGE: 62 year old female  YOB: 1960  MRN: [id number]  EVALUATION DATE & TIME: 1/10/2023 12:50 PM    PCP: No Ref-Primary, Physician    ED PROVIDER: Teodora Contreras M.D.      Chief Complaint   Patient presents with     Leg Swelling     Shortness of Breath         FINAL IMPRESSION:  1. Urinary tract infection without hematuria, site unspecified    2. Generalized edema    3. Hyperglycemia          ED COURSE & MEDICAL DECISION MAKING:    ED Course as of 01/10/23 1414   Tue Rhys 10, 2023   1333 Patient with chronic one year mild SOB with exertion with no chest pain with morbid obesity over 400 lb habitus, with chronic edema all arms and legs for same period of time but slight increase to right greater than left arm today and overall with some discomfort to feet gradually worsening, went to scheduled appointment and sent to the ER for labs. No urine output changes, history of thyroid issues, or prior diuresis use. She was engaged in shared clinical decision making conversation with daughter at bedside, they are amenable to extremity US, CXR without rales on examination or hypoxia or tachypnea or heart murmur, EKG which is reassuring, TSH, BMP with LFTs, UA and urine sodium and CBC and clinical reassessment.   1405 LFTs and chemistry WNL except glucose 168 in patient with prediabetes and UA with many WBC with likely UTI beginning with no urinary complaints, urine culture in progress with WBC 14 but no other SIRS criteria met with normal VS and thus abx begun orally    1414 Urine Na WNL reassuringly. Patient pending imaging signed out to PM ED pending imaging and reassessment.     12:56 PM Met with patient for initial interview and exam. Discussed initial plan for care for their stay in the emergency department.  2:00 PM Patient signed out to oncoming provider at the end of shift pending labs, imaging and disposition.   Pertinent Labs & Imaging studies reviewed.  (See chart for details)    N95 worn  A face shield was worn also  COVID PPE    Medical Decision Making    History:    Supplemental history from: Family Member/Significant Other    External Record(s) reviewed: Outpatient Record: 1/10/23 [facility]    Work Up:    Chart documentation includes differential considered and any EKGs or imaging independently interpreted by provider.    In additional to work up documented, I considered the following work up: See chart documentation, if applicable.    External consultation:    Discussion of management with another provider: See chart documentation, if applicable    Complicating factors:    Care impacted by chronic illness: Hypertension    Care affected by social determinants of health: N/A    Disposition considerations: Admission considered. Patient was signed out to the oncoming physician, disposition pending.        At the conclusion of the encounter I discussed the results of all of the tests and the disposition. The questions were answered. The patient or family acknowledged understanding and was agreeable with the care plan.     MEDICATIONS GIVEN IN THE EMERGENCY:  Medications   cephALEXin (KEFLEX) capsule 500 mg (has no administration in time range)       NEW PRESCRIPTIONS STARTED AT TODAY'S ER VISIT  New Prescriptions    CEPHALEXIN (KEFLEX) 500 MG CAPSULE    Take 1 capsule (500 mg) by mouth 4 times daily for 7 days          =================================================================    HPI      José Luis Bocanegra is a 62 year old female with PMHx of morbid obesity with HTN and GERD who presents to the ED today via private vehicle with leg swelling.     Use of  Yes- In person- Hmong    Per patient she notes having noticed ongoing leg and arm swelling for the past year. She notes having had an appointment for this with her primary today who refferred her here. She notes the leg and arm swelling having acutely worsened over the past week. Patient  notes her leg swelling having worsened four days ago Friday. She notes her right foot developing new pain with weight bearing. She reports her right hand swelling having worsened today. She notes the swelling being severe and having difficulties closing her hands secondary to this. She notes she has gout and will have pain in her feet but notes the swelling and pain is different to this. She also reports shortness of breath that has been new and worse with exertion. She denies any changes to her urine as well as any other complaints at the time.     Per chart review patient seen 1/10/2023 at [facility] for the evaluation of right hand and leg swelling for the past two week with shortness of breath with exertion. On exam TTP over right hand and foot. Patient sent to ER for further evaluation of right hand and leg swelling. Ddx is extensive and includes unstable angina, covid, MI, PE, gout, DVT, among others. Patient verbalized understanding and is agreeable to plan.     REVIEW OF SYSTEMS   All other systems reviewed and are negative except as noted above in HPI.    PAST MEDICAL HISTORY:  Past Medical History:   Diagnosis Date     Benign essential hypertension 2/20/2020     GERD (gastroesophageal reflux disease) 2/20/2020     Other insomnia 2/20/2020       PAST SURGICAL HISTORY:  History reviewed. No pertinent surgical history.    CURRENT MEDICATIONS:    cephALEXin (KEFLEX) 500 MG capsule  acetaminophen (TYLENOL) 325 MG tablet  FLUoxetine (PROZAC) 40 MG capsule  ibuprofen (IBU) 400 MG tablet  lisinopril (PRINIVIL,ZESTRIL) 10 MG tablet  metFORMIN (GLUCOPHAGE-XR) 500 MG 24 hr tablet  omeprazole (PRILOSEC) 20 MG capsule  ondansetron (ZOFRAN ODT) 4 MG ODT tab  ranitidine (ZANTAC) 300 MG tablet  traZODone (DESYREL) 50 MG tablet        ALLERGIES:  No Known Allergies    FAMILY HISTORY:  No family history on file.    SOCIAL HISTORY:   Social History     Socioeconomic History     Marital status:   "  Tobacco Use     Smoking status: Never     Smokeless tobacco: Never       VITALS:  Patient Vitals for the past 24 hrs:   BP Temp Pulse Resp SpO2 Height Weight   01/10/23 1345 (!) 166/81 -- 65 18 95 % -- --   01/10/23 1331 (!) 177/86 -- 67 20 94 % -- --   01/10/23 1228 (!) 144/67 98.3  F (36.8  C) 72 20 96 % 1.499 m (4' 11\") (!) 191 kg (421 lb 1.3 oz)       PHYSICAL EXAM    GENERAL: Awake, alert.  In no acute distress.   HEENT: Normocephalic, atraumatic.  Pupils equal, round and reactive.  Conjunctiva normal.  EOMI.  NECK: No stridor or apparent deformity.  PULMONARY: Symmetrical breath sounds without distress.  Lungs clear to auscultation bilaterally without wheezes, rhonchi or rales.  CARDIO: Regular rate and rhythm.  No significant murmur, rub or gallop.  Radial pulses strong and symmetrical.  ABDOMINAL: Abdomen soft, non-distended and non-tender to palpation.  No CVAT, no palpable hepatosplenomegaly.  EXTREMITIES: Bilateral upper extremity 1+ edema, right greater than left. Bilateral lower extremity 1+ edema.   NEURO: Alert and oriented to person, place and time.  Cranial nerves grossly intact.  No focal motor deficit.  PSYCH: Normal mood and affect  SKIN: No rashes      LAB:  All pertinent labs reviewed and interpreted.  Results for orders placed or performed during the hospital encounter of 01/10/23   Basic metabolic panel   Result Value Ref Range    Sodium 139 136 - 145 mmol/L    Potassium 3.4 3.4 - 5.3 mmol/L    Chloride 104 98 - 107 mmol/L    Carbon Dioxide (CO2) 22 22 - 29 mmol/L    Anion Gap 13 7 - 15 mmol/L    Urea Nitrogen 16.7 8.0 - 23.0 mg/dL    Creatinine 1.10 (H) 0.51 - 0.95 mg/dL    Calcium 9.0 8.8 - 10.2 mg/dL    Glucose 168 (H) 70 - 99 mg/dL    GFR Estimate 57 (L) >60 mL/min/1.73m2   UA with Microscopic reflex to Culture    Specimen: Urine, Clean Catch   Result Value Ref Range    Color Urine Yellow Colorless, Straw, Light Yellow, Yellow    Appearance Urine Turbid (A) Clear    Glucose Urine " Negative Negative mg/dL    Bilirubin Urine Negative Negative    Ketones Urine Negative Negative mg/dL    Specific Gravity Urine 1.017 1.001 - 1.030    Blood Urine 0.03 mg/dL (A) Negative    pH Urine 6.5 5.0 - 7.0    Protein Albumin Urine 100 (A) Negative mg/dL    Urobilinogen Urine <2.0 <2.0 mg/dL    Nitrite Urine Positive (A) Negative    Leukocyte Esterase Urine 500 Thelma/uL (A) Negative    Bacteria Urine Moderate (A) None Seen /HPF    WBC Clumps Urine Present (A) None Seen /HPF    RBC Urine 10 (H) <=2 /HPF    WBC Urine 163 (H) <=5 /HPF    Squamous Epithelials Urine 1 <=1 /HPF   Hepatic function panel   Result Value Ref Range    Protein Total 7.6 6.4 - 8.3 g/dL    Albumin 3.5 3.5 - 5.2 g/dL    Bilirubin Total 0.5 <=1.2 mg/dL    Alkaline Phosphatase 118 (H) 35 - 104 U/L    AST 18 10 - 35 U/L    ALT 15 10 - 35 U/L    Bilirubin Direct <0.20 0.00 - 0.30 mg/dL   CBC with platelets and differential   Result Value Ref Range    WBC Count 14.6 (H) 4.0 - 11.0 10e3/uL    RBC Count 5.07 3.80 - 5.20 10e6/uL    Hemoglobin 14.3 11.7 - 15.7 g/dL    Hematocrit 44.3 35.0 - 47.0 %    MCV 87 78 - 100 fL    MCH 28.2 26.5 - 33.0 pg    MCHC 32.3 31.5 - 36.5 g/dL    RDW 13.2 10.0 - 15.0 %    Platelet Count 223 150 - 450 10e3/uL    % Neutrophils 68 %    % Lymphocytes 22 %    % Monocytes 9 %    % Eosinophils 0 %    % Basophils 0 %    % Immature Granulocytes 1 %    NRBCs per 100 WBC 0 <1 /100    Absolute Neutrophils 10.0 (H) 1.6 - 8.3 10e3/uL    Absolute Lymphocytes 3.2 0.8 - 5.3 10e3/uL    Absolute Monocytes 1.3 0.0 - 1.3 10e3/uL    Absolute Eosinophils 0.1 0.0 - 0.7 10e3/uL    Absolute Basophils 0.0 0.0 - 0.2 10e3/uL    Absolute Immature Granulocytes 0.1 <=0.4 10e3/uL    Absolute NRBCs 0.0 10e3/uL   Sodium random urine   Result Value Ref Range    Sodium Urine mmol/L 69 mmol/L       RADIOLOGY:  Reviewed all pertinent imaging. Please see official radiology report.  US Lower Extremity Venous Duplex Bilateral    (Results Pending)   XR Chest 2  Views    (Results Pending)   US Upper Extremity Venous Duplex Right    (Results Pending)         EKG:    Reviewed and interpreted as: 10-FLORINDA-2023 13:20. HR 68 bpm. Sinus rhythm. No ST abnormalities. When compared with EKG of 01-APR-2022 18:46, EKG unchanged.       I have independently reviewed and interpreted the EKG(s) documented above.        I, Katharine Powers, am serving as a scribe to document services personally performed by Dr. Teodora Contreras based on my observation and the provider's statements to me. I, Teodora Contreras MD attest that Katharine Powers is acting in a scribe capacity, has observed my performance of the services and has documented them in accordance with my direction.     Teodora Contreras MD  01/10/23 6163

## 2023-01-10 NOTE — LETTER
[place] [address] [id number] [phone]      January 10, 2023    RE:  José Luis Bocanegra                                                                                                                                                       [address] [facility] [address]            To whom it may concern:    José Luis Bocanegra was seen in clinic today. He may return to work on 1/16/22.        Sincerely,        [facility]ENG [facility]

## 2023-01-10 NOTE — ED NOTES
eMERGENCY dEPARTMENT PROGRESS NOTE         ED COURSE AND MEDICAL DECISION MAKING  Patient was signed out to me by Dr. Teodora Contreras at 3:44 PM      José Luis Bocanegra is a 62 year old female who presents for evaluation of chronic right leg and arm swelling that has worsened over the past week. Developing pain in her right foot with weight bearing. Also reports shortness of breath that is worse with exertion. History of gout. Pending imaging.     Patient has blood clot in both of her gastroc veins on the right side.  I will put a call out to hematology to see if they would recommend treating this or not.  I did add on a BNP because the patient had some congestion on her chest x-ray.    I spoke with Dr. Mosley with hematology.  Initially he had recommended Lovenox and warfarin because he thought with her weight that she would not be a good NOAC candidate.  He then called me back and said that he thinks Xarelto would be a good choice for her so I will prescribe Xarelto.  I went back and updated the patient and family.  They are understanding that they need to follow-up with the primary doctor.  They are understanding that they likely have a new diagnosis of diabetes to go with the new DVT.  Patient was discharged in stable condition.    I, Areli Hale, am serving as a scribe to document services personally performed by Cathleen Rizzo, based on my observations and the provider's statements to me.  I, Cathleen Rizzo, attest that Areli Hale is acting in a scribe capacity, has observed my performance of the services and has documented them in accordance with my direction.     LAB  Pertinent labs results reviewed   Labs Ordered and Resulted from Time of ED Arrival to Time of ED Departure   BASIC METABOLIC PANEL - Abnormal       Result Value    Sodium 139      Potassium 3.4      Chloride 104      Carbon Dioxide (CO2) 22      Anion Gap 13      Urea Nitrogen 16.7      Creatinine 1.10 (*)     Calcium 9.0      Glucose 168 (*)      GFR Estimate 57 (*)    ROUTINE UA WITH MICROSCOPIC REFLEX TO CULTURE - Abnormal    Color Urine Yellow      Appearance Urine Turbid (*)     Glucose Urine Negative      Bilirubin Urine Negative      Ketones Urine Negative      Specific Gravity Urine 1.017      Blood Urine 0.03 mg/dL (*)     pH Urine 6.5      Protein Albumin Urine 100 (*)     Urobilinogen Urine <2.0      Nitrite Urine Positive (*)     Leukocyte Esterase Urine 500 Thelma/uL (*)     Bacteria Urine Moderate (*)     WBC Clumps Urine Present (*)     RBC Urine 10 (*)     WBC Urine 163 (*)     Squamous Epithelials Urine 1     HEPATIC FUNCTION PANEL - Abnormal    Protein Total 7.6      Albumin 3.5      Bilirubin Total 0.5      Alkaline Phosphatase 118 (*)     AST 18      ALT 15      Bilirubin Direct <0.20     CBC WITH PLATELETS AND DIFFERENTIAL - Abnormal    WBC Count 14.6 (*)     RBC Count 5.07      Hemoglobin 14.3      Hematocrit 44.3      MCV 87      MCH 28.2      MCHC 32.3      RDW 13.2      Platelet Count 223      % Neutrophils 68      % Lymphocytes 22      % Monocytes 9      % Eosinophils 0      % Basophils 0      % Immature Granulocytes 1      NRBCs per 100 WBC 0      Absolute Neutrophils 10.0 (*)     Absolute Lymphocytes 3.2      Absolute Monocytes 1.3      Absolute Eosinophils 0.1      Absolute Basophils 0.0      Absolute Immature Granulocytes 0.1      Absolute NRBCs 0.0     HEMOGLOBIN A1C - Abnormal    Hemoglobin A1C 7.3 (*)    TSH WITH FREE T4 REFLEX - Normal    TSH 3.56     SODIUM RANDOM URINE    Sodium Urine mmol/L 69     URINE CULTURE         RADIOLOGY    Pertinent imaging reviewed   Please see official radiology report.  XR Chest 2 Views   Preliminary Result   IMPRESSION: No change in cardiomegaly. A small right pleural effusion is stable, as is bibasilar patchy opacity. These findings are likely chronic in nature. There is mild pulmonary vessel congestion. This is new from prior study.      US Lower Extremity Venous Duplex Bilateral   Final Result    IMPRESSION:   1.  Approximate 5 cm segment of acute occlusive DVT involving both of the paired gastrocnemius veins in the upper right calf.    2.  Both lower extremities are otherwise negative for DVT.       US Upper Extremity Venous Duplex Right   Final Result   IMPRESSION:   1.  No deep venous thrombosis in the right upper extremity.          FINAL IMPRESSION    1. Urinary tract infection without hematuria, site unspecified    2. Generalized edema    3. Hyperglycemia    4. Acute deep vein thrombosis (DVT) of distal vein of right lower extremity (H)         DISCHARGE MEDICATIONS  New Prescriptions    CEPHALEXIN (KEFLEX) 500 MG CAPSULE    Take 1 capsule (500 mg) by mouth 4 times daily for 7 days    RIVAROXABAN ANTICOAGULANT 15 & 20 MG TBPK STARTER THERAPY PACK    Take 15 mg by mouth 2 times daily (with meals) for 21 days, THEN 20 mg daily with food for 9 days.        Cathleen Rizzo MD  01/10/23 1472

## 2023-01-10 NOTE — PROGRESS NOTES
URGENT CARE VISIT:    SUBJECTIVE:   José Luis Bocanegra is a 62 year old female who presents for right hand and leg swelling for 2 weeks. She has also noticed sob with exertion for the last week. She denies chest pain, dizziness or cough. She has a lot of pain in extremities. No treatments tried. Symptoms are worsening.    [employer] phone  was used.     PMH:   Past Medical History:   Diagnosis Date     Benign essential hypertension 2/20/2020     GERD (gastroesophageal reflux disease) 2/20/2020     Other insomnia 2/20/2020     Allergies: Patient has no known allergies.  Medications:   Current Outpatient Medications   Medication Sig Dispense Refill     acetaminophen (TYLENOL) 325 MG tablet [ACETAMINOPHEN (TYLENOL) 325 MG TABLET] Take 2 tablets (650 mg total) by mouth every 6 (six) hours as needed for pain or fever. 100 tablet 0     FLUoxetine (PROZAC) 40 MG capsule [FLUOXETINE (PROZAC) 40 MG CAPSULE] TAKE 2 CAPSULES BY MOUTH WEEKLY ON MONDAYS./IB ASTHIV NOJ 2 LUB TXHUA HNUB MONDAY RAWS LI QHIA  5     ibuprofen (IBU) 400 MG tablet [IBUPROFEN (IBU) 400 MG TABLET] Take 1 tablet (400 mg total) by mouth every 6 (six) hours as needed for pain or fever. 50 tablet 0     lisinopril (PRINIVIL,ZESTRIL) 10 MG tablet [LISINOPRIL (PRINIVIL,ZESTRIL) 10 MG TABLET] TAKE 1 TABLET DAILY BY MOUTH FOR HIGH BLOOD PRESSURE // IB HNUB NOJ 1 LUB PAB DILSHAD NTSHAV SIAB  1     metFORMIN (GLUCOPHAGE-XR) 500 MG 24 hr tablet [METFORMIN (GLUCOPHAGE-XR) 500 MG 24 HR TABLET]        omeprazole (PRILOSEC) 20 MG capsule [OMEPRAZOLE (PRILOSEC) 20 MG CAPSULE]        ondansetron (ZOFRAN ODT) 4 MG ODT tab Take 1 tablet (4 mg) by mouth every 8 hours as needed for nausea 10 tablet 0     ranitidine (ZANTAC) 300 MG tablet [RANITIDINE (ZANTAC) 300 MG TABLET] TAKE 1 TABLET EVERY DAY FOR STOMACH // 1 HNUB NOJ 1 LUB PAB DILSHAD MOB PLAB/NCAUJ PLAB  5     traZODone (DESYREL) 50 MG tablet [TRAZODONE (DESYREL) 50 MG TABLET] TAKE 1 TABLET BY MOUTH DAILY AT BEDTIME // 1 HNUB NOJ  1 LUB THAUM MUS PW  5     Social History:   Social History     Tobacco Use     Smoking status: Never     Smokeless tobacco: Never   Substance Use Topics     Alcohol use: Not on file       ROS: ROS otherwise found to be negative except as noted above.     OBJECTIVE:  BP (!) 159/97 (BP Location: Left arm, Patient Position: Sitting, Cuff Size: Adult Large)   Pulse 70   Temp 98.5  F (36.9  C) (Oral)   Resp 18   Wt 87 kg (191 lb 12.8 oz)   SpO2 97%   BMI 32.92 kg/m    General: WDWN in NAD  Eyes: EOMI,  PERRL, conjunctiva clear  HENT: Ear canals and TM's normal.  Nose and mouth without ulcers, erythema or lesions  Neck: Supple, non-tender  Cardiac: RRR without murmurs, rubs, or gallops.  Respiratory: LCTAB without adventitious sounds. Non-labored breathing.  Abdominal: Active bowel sounds in all four quadrants. Soft, non-tender without guarding or rebound.   Extremities: No peripheral edema or tenderness, peripheral pulses normal  Musculoskeletal: No gross deformities noted, no erythema, FROM noted in all extremities. There is diffuse TTP over right hand and foot.  Neuro: Normal strength and tone, sensory exam grossly normal,  normal speech and mentation  Integumentary: mild erythema and moderate edema of right hand and foot    ASSESSMENT:     ICD-10-CM    1. Pain of right lower extremity  M79.604 Crutches Order for DME - ONLY FOR DME      2. Swelling of right hand  M79.89       3. SOB (shortness of breath)  R06.02            PLAN:  45 minutes spent on the date of the encounter doing chart review, review of outside records, review of test results, interpretation of tests, patient visit, documentation and discussion with family   Patient Instructions   Sent to ER for evaluation of right hand and leg swelling, sob with exertion for the last 2 weeks. It is getting progressively worse. She has a lot of pain in hands. Ddx is extensive and includes unstable angina, covid, MI, PE, gout, DVT, among others.   Patient  verbalized understanding and is agreeable to plan. The patient was discharged ambulatory and in stable condition.    Mechelle Matamoros PA-C ....................  1/10/2023   1:10 PM

## 2023-01-10 NOTE — ED NOTES
"ED Provider In Triage Note  [facility]  Encounter Date: Rhys 10, 2023    No chief complaint on file.      Brief HPI:   José Luis Bocanegra is a 62 year old female presenting to the Emergency Department with a chief complaint of swelling to right hand and leg off and on for past couple years.  Reports dyspnea for the past year as well.  Symptoms not improving or going away, no treatment.  Pt seen in walk-in clinic today, sent to ED for further evaluation.      Brief Physical Exam:  BP (!) 144/67   Pulse 72   Temp 98.3  F (36.8  C)   Resp 20   Ht 1.499 m (4' 11\")   Wt (!) 191 kg (421 lb 1.3 oz)   SpO2 96%   BMI 85.05 kg/m    General: Non-toxic appearing  HEENT: Atraumatic  Resp: No respiratory distress  Abdomen: Non-peritoneal  Neuro: Alert, oriented, answers questions appropriately  Psych: Behavior appropriate    Plan Initiated in Triage:  Ekg, labs, imaging      PIT Dispo:   Return to lobby while awaiting workup and ED bed availability    Lino Willett MD on 1/10/2023 at 12:25 PM    Patient was evaluated by the Physician in Triage due to a limitation of available rooms in the Emergency Department. A plan of care was discussed based on the information obtained on the initial evaluation and patient was consuled to return back to the Emergency Department lobby after this initial evalutaiton until results were obtained or a room became available in the Emergency Department. Patient was counseled not to leave prior to receiving the results of their workup.     Lino Willett MD  [facility] EMERGENCY DEPARTMENT  [address] [place] [id number]  [phone]       Lino Willett MD  01/10/23 1230    "

## 2023-01-10 NOTE — PATIENT INSTRUCTIONS
Sent to ER for evaluation of right hand and leg swelling, sob with exertion for the last 2 weeks. It is getting progressively worse. She has a lot of pain in hands. Ddx is extensive and includes unstable angina, covid, MI, PE, gout, DVT, among others.

## 2023-01-12 LAB — BACTERIA UR CULT: NORMAL

## 2023-02-03 ENCOUNTER — NURSE TRIAGE (OUTPATIENT)
Dept: NURSING | Facility: CLINIC | Age: 63
End: 2023-02-03
Payer: COMMERCIAL

## 2023-02-03 NOTE — TELEPHONE ENCOUNTER
"PT. Daughter Calling in to triage today with concerns about her mother having blood in urine. Pt. Is ESL and Daughter is translating for her. ( attempt to gain verbal consent to communicate but Pt is now in the restroom). Information gathering from daughter stated  Pt Seen at [facility] for bladder infection, she had an MRI at that time and was discovered that she had a blood clot on her leg ( w no s/sx, also has gout as well) RLE and was started on Xarelto . After beginning blood thinner, for 3 days straight was peeing blood last week. Daughter states that she got scared and discontinued taking the blood thinner d/t this.   Currently has streaks of blood in urine and she feels that it is from the bladder infection.  Daughter needed to discontinue call as Pt. Needed her in the restroom. RN stressed that they need to be triaged d/t her current situation, Will call back to complete triage.    Kenna Osorio, [place], [place], PHN on 2/3/2023 at 10:03 AM  [facility]  RN utilized sound nursing judgement based on facility triage protocols during this encounter.       Answer Assessment - Initial Assessment Questions  1. COLOR of URINE: \"Describe the color of the urine.\"  (e.g., tea-colored, pink, red, blood clots, bloody)      Bright red     2. ONSET: \"When did the bleeding start?\"       Sunday the 22 nd, 23rd and 24 th  Then Pt stopped taking the Xarelto     3. EPISODES: \"How many times has there been blood in the urine?\" or \"How many times today?\"      *No Answer*  4. PAIN with URINATION: \"Is there any pain with passing your urine?\" If Yes, ask: \"How bad is the pain?\"  (Scale 1-10; or mild, moderate, severe)     - MILD - complains slightly about urination hurting     - MODERATE - interferes with normal activities       - SEVERE - excruciating, unwilling or unable to urinate because of the pain       *No Answer*  5. FEVER: \"Do you have a fever?\" If Yes, ask: \"What is your temperature, how was it " "measured, and when did it start?\"      *No Answer*  6. ASSOCIATED SYMPTOMS: \"Are you passing urine more frequently than usual?\"      *No Answer*  7. OTHER SYMPTOMS: \"Do you have any other symptoms?\" (e.g., back/flank pain, abdominal pain, vomiting)      *No Answer*  8. PREGNANCY: \"Is there any chance you are pregnant?\" \"When was your last menstrual period?\"      *No Answer*    Protocols used: URINE - BLOOD IN-A-OH      "

## 2024-01-23 ENCOUNTER — HOSPITAL ENCOUNTER (OUTPATIENT)
Dept: ULTRASOUND IMAGING | Facility: HOSPITAL | Age: 64
Discharge: HOME | End: 2024-01-23
Attending: PHYSICIAN ASSISTANT | Admitting: PHYSICIAN ASSISTANT
Payer: COMMERCIAL

## 2024-01-23 DIAGNOSIS — N18.31 CHRONIC KIDNEY DISEASE (CKD) STAGE G3A/A1, MODERATELY DECREASED GLOMERULAR FILTRATION RATE (GFR) BETWEEN 45-59 ML/MIN/1.73 SQUARE METER AND ALBUMINURIA CREATININE RATIO LESS THAN 30 MG/G (H): ICD-10-CM

## 2024-01-23 DIAGNOSIS — E11.9 DIABETES MELLITUS (H): ICD-10-CM

## 2024-01-23 DIAGNOSIS — I10 HYPERTENSION: ICD-10-CM

## 2024-01-23 DIAGNOSIS — M10.9 GOUT: ICD-10-CM

## 2024-01-23 PROCEDURE — 76770 US EXAM ABDO BACK WALL COMP: CPT

## 2024-02-14 ENCOUNTER — MEDICAL CORRESPONDENCE (OUTPATIENT)
Dept: SCHEDULING | Facility: CLINIC | Age: 64
End: 2024-02-14
Payer: COMMERCIAL

## 2024-03-29 ENCOUNTER — TELEPHONE (OUTPATIENT)
Dept: FAMILY MEDICINE | Facility: CLINIC | Age: 64
End: 2024-03-29

## 2024-03-29 ENCOUNTER — APPOINTMENT (OUTPATIENT)
Dept: ULTRASOUND IMAGING | Facility: HOSPITAL | Age: 64
End: 2024-03-29
Attending: EMERGENCY MEDICINE
Payer: COMMERCIAL

## 2024-03-29 ENCOUNTER — OFFICE VISIT (OUTPATIENT)
Dept: FAMILY MEDICINE | Facility: CLINIC | Age: 64
End: 2024-03-29
Payer: COMMERCIAL

## 2024-03-29 ENCOUNTER — APPOINTMENT (OUTPATIENT)
Dept: CT IMAGING | Facility: HOSPITAL | Age: 64
End: 2024-03-29
Payer: COMMERCIAL

## 2024-03-29 ENCOUNTER — HOSPITAL ENCOUNTER (EMERGENCY)
Facility: HOSPITAL | Age: 64
Discharge: HOME | End: 2024-03-29
Attending: EMERGENCY MEDICINE | Admitting: EMERGENCY MEDICINE
Payer: COMMERCIAL

## 2024-03-29 VITALS
TEMPERATURE: 100 F | DIASTOLIC BLOOD PRESSURE: 82 MMHG | OXYGEN SATURATION: 91 % | RESPIRATION RATE: 26 BRPM | BODY MASS INDEX: 40.12 KG/M2 | WEIGHT: 199 LBS | HEIGHT: 59 IN | HEART RATE: 97 BPM | SYSTOLIC BLOOD PRESSURE: 177 MMHG

## 2024-03-29 VITALS
OXYGEN SATURATION: 94 % | RESPIRATION RATE: 18 BRPM | DIASTOLIC BLOOD PRESSURE: 86 MMHG | TEMPERATURE: 98.5 F | HEART RATE: 92 BPM | SYSTOLIC BLOOD PRESSURE: 148 MMHG

## 2024-03-29 DIAGNOSIS — I82.401 ACUTE DEEP VEIN THROMBOSIS (DVT) OF RIGHT LOWER EXTREMITY, UNSPECIFIED VEIN (H): ICD-10-CM

## 2024-03-29 DIAGNOSIS — R06.02 SHORTNESS OF BREATH: ICD-10-CM

## 2024-03-29 DIAGNOSIS — R79.89 ELEVATED D-DIMER: ICD-10-CM

## 2024-03-29 DIAGNOSIS — R06.01 ORTHOPNEA: ICD-10-CM

## 2024-03-29 DIAGNOSIS — M25.50 GENERALIZED JOINT PAIN: Primary | ICD-10-CM

## 2024-03-29 LAB
ANION GAP SERPL CALCULATED.3IONS-SCNC: 12 MMOL/L (ref 7–15)
BASOPHILS # BLD AUTO: 0 10E3/UL (ref 0–0.2)
BASOPHILS NFR BLD AUTO: 0 %
BUN SERPL-MCNC: 18 MG/DL (ref 8–23)
CALCIUM SERPL-MCNC: 9 MG/DL (ref 8.8–10.2)
CHLORIDE SERPL-SCNC: 102 MMOL/L (ref 98–107)
CREAT SERPL-MCNC: 1.27 MG/DL (ref 0.51–0.95)
D DIMER PPP FEU-MCNC: 3.47 UG/ML FEU (ref 0–0.5)
DEPRECATED HCO3 PLAS-SCNC: 24 MMOL/L (ref 22–29)
EGFRCR SERPLBLD CKD-EPI 2021: 47 ML/MIN/1.73M2
EOSINOPHIL # BLD AUTO: 0.1 10E3/UL (ref 0–0.7)
EOSINOPHIL NFR BLD AUTO: 1 %
ERYTHROCYTE [DISTWIDTH] IN BLOOD BY AUTOMATED COUNT: 13.2 % (ref 10–15)
FLUAV AG SPEC QL IA: NEGATIVE
FLUAV RNA SPEC QL NAA+PROBE: NEGATIVE
FLUBV AG SPEC QL IA: NEGATIVE
FLUBV RNA RESP QL NAA+PROBE: NEGATIVE
GLUCOSE SERPL-MCNC: 153 MG/DL (ref 70–99)
HCT VFR BLD AUTO: 39.6 % (ref 35–47)
HGB BLD-MCNC: 12.4 G/DL (ref 11.7–15.7)
IMM GRANULOCYTES # BLD: 0 10E3/UL
IMM GRANULOCYTES NFR BLD: 0 %
LYMPHOCYTES # BLD AUTO: 3 10E3/UL (ref 0.8–5.3)
LYMPHOCYTES NFR BLD AUTO: 28 %
MCH RBC QN AUTO: 27.6 PG (ref 26.5–33)
MCHC RBC AUTO-ENTMCNC: 31.3 G/DL (ref 31.5–36.5)
MCV RBC AUTO: 88 FL (ref 78–100)
MONOCYTES # BLD AUTO: 1 10E3/UL (ref 0–1.3)
MONOCYTES NFR BLD AUTO: 9 %
NEUTROPHILS # BLD AUTO: 6.5 10E3/UL (ref 1.6–8.3)
NEUTROPHILS NFR BLD AUTO: 62 %
NRBC # BLD AUTO: 0 10E3/UL
NRBC BLD AUTO-RTO: 0 /100
NT-PROBNP SERPL-MCNC: 121 PG/ML (ref 0–900)
PLATELET # BLD AUTO: 181 10E3/UL (ref 150–450)
POTASSIUM SERPL-SCNC: 4.2 MMOL/L (ref 3.4–5.3)
RBC # BLD AUTO: 4.5 10E6/UL (ref 3.8–5.2)
RSV RNA SPEC NAA+PROBE: NEGATIVE
SARS-COV-2 RNA RESP QL NAA+PROBE: NEGATIVE
SODIUM SERPL-SCNC: 138 MMOL/L (ref 135–145)
TROPONIN T SERPL HS-MCNC: 14 NG/L
URATE SERPL-MCNC: 7.3 MG/DL (ref 2.4–5.7)
WBC # BLD AUTO: 10.7 10E3/UL (ref 4–11)

## 2024-03-29 PROCEDURE — 93970 EXTREMITY STUDY: CPT

## 2024-03-29 PROCEDURE — 84484 ASSAY OF TROPONIN QUANT: CPT

## 2024-03-29 PROCEDURE — 99214 OFFICE O/P EST MOD 30 MIN: CPT | Performed by: STUDENT IN AN ORGANIZED HEALTH CARE EDUCATION/TRAINING PROGRAM

## 2024-03-29 PROCEDURE — 87637 SARSCOV2&INF A&B&RSV AMP PRB: CPT

## 2024-03-29 PROCEDURE — 36415 COLL VENOUS BLD VENIPUNCTURE: CPT | Performed by: STUDENT IN AN ORGANIZED HEALTH CARE EDUCATION/TRAINING PROGRAM

## 2024-03-29 PROCEDURE — 83880 ASSAY OF NATRIURETIC PEPTIDE: CPT | Performed by: STUDENT IN AN ORGANIZED HEALTH CARE EDUCATION/TRAINING PROGRAM

## 2024-03-29 PROCEDURE — 250N000011 HC RX IP 250 OP 636

## 2024-03-29 PROCEDURE — 96374 THER/PROPH/DIAG INJ IV PUSH: CPT | Mod: 59

## 2024-03-29 PROCEDURE — 85004 AUTOMATED DIFF WBC COUNT: CPT

## 2024-03-29 PROCEDURE — 250N000011 HC RX IP 250 OP 636: Performed by: EMERGENCY MEDICINE

## 2024-03-29 PROCEDURE — 250N000013 HC RX MED GY IP 250 OP 250 PS 637

## 2024-03-29 PROCEDURE — 85379 FIBRIN DEGRADATION QUANT: CPT | Performed by: STUDENT IN AN ORGANIZED HEALTH CARE EDUCATION/TRAINING PROGRAM

## 2024-03-29 PROCEDURE — 36415 COLL VENOUS BLD VENIPUNCTURE: CPT

## 2024-03-29 PROCEDURE — 84550 ASSAY OF BLOOD/URIC ACID: CPT | Performed by: STUDENT IN AN ORGANIZED HEALTH CARE EDUCATION/TRAINING PROGRAM

## 2024-03-29 PROCEDURE — 71275 CT ANGIOGRAPHY CHEST: CPT

## 2024-03-29 PROCEDURE — 87804 INFLUENZA ASSAY W/OPTIC: CPT | Performed by: STUDENT IN AN ORGANIZED HEALTH CARE EDUCATION/TRAINING PROGRAM

## 2024-03-29 PROCEDURE — 99285 EMERGENCY DEPT VISIT HI MDM: CPT | Mod: 25

## 2024-03-29 PROCEDURE — 93005 ELECTROCARDIOGRAM TRACING: CPT

## 2024-03-29 PROCEDURE — 80048 BASIC METABOLIC PNL TOTAL CA: CPT

## 2024-03-29 RX ORDER — ATORVASTATIN CALCIUM 20 MG/1
20 TABLET, FILM COATED ORAL DAILY
COMMUNITY
Start: 2024-02-14

## 2024-03-29 RX ORDER — IOPAMIDOL 755 MG/ML
70 INJECTION, SOLUTION INTRAVASCULAR ONCE
Status: COMPLETED | OUTPATIENT
Start: 2024-03-29 | End: 2024-03-29

## 2024-03-29 RX ORDER — LINAGLIPTIN 5 MG/1
5 TABLET, FILM COATED ORAL DAILY
COMMUNITY
Start: 2024-02-14 | End: 2024-04-14

## 2024-03-29 RX ORDER — ALLOPURINOL 100 MG/1
100 TABLET ORAL 2 TIMES DAILY
COMMUNITY
Start: 2024-02-14

## 2024-03-29 RX ORDER — LANSOPRAZOLE 30 MG/1
30 CAPSULE, DELAYED RELEASE ORAL
COMMUNITY
Start: 2023-08-24 | End: 2024-04-14

## 2024-03-29 RX ORDER — KETOROLAC TROMETHAMINE 15 MG/ML
15 INJECTION, SOLUTION INTRAMUSCULAR; INTRAVENOUS ONCE
Status: COMPLETED | OUTPATIENT
Start: 2024-03-29 | End: 2024-03-29

## 2024-03-29 RX ORDER — LOSARTAN POTASSIUM 50 MG/1
50 TABLET ORAL DAILY
COMMUNITY
Start: 2024-01-16 | End: 2024-04-14

## 2024-03-29 RX ORDER — POLYETHYLENE GLYCOL 400 AND PROPYLENE GLYCOL 4; 3 MG/ML; MG/ML
1 SOLUTION/ DROPS OPHTHALMIC 4 TIMES DAILY
COMMUNITY
Start: 2023-06-19 | End: 2024-04-14

## 2024-03-29 RX ORDER — LEVETIRACETAM 250 MG/1
15 TABLET, FILM COATED ORAL 2 TIMES DAILY PRN
COMMUNITY
Start: 2023-11-20 | End: 2024-04-14

## 2024-03-29 RX ORDER — APIXABAN 5 MG (74)
KIT ORAL
Qty: 60 EACH | Refills: 0 | Status: SHIPPED | OUTPATIENT
Start: 2024-03-29 | End: 2024-03-29

## 2024-03-29 RX ORDER — GLIPIZIDE 10 MG/1
10 TABLET, FILM COATED, EXTENDED RELEASE ORAL DAILY
COMMUNITY
Start: 2024-02-29

## 2024-03-29 RX ADMIN — APIXABAN 10 MG: 5 TABLET, FILM COATED ORAL at 22:15

## 2024-03-29 RX ADMIN — KETOROLAC TROMETHAMINE 15 MG: 15 INJECTION, SOLUTION INTRAMUSCULAR; INTRAVENOUS at 21:17

## 2024-03-29 RX ADMIN — IOPAMIDOL 70 ML: 755 INJECTION, SOLUTION INTRAVENOUS at 20:45

## 2024-03-29 ASSESSMENT — ACTIVITIES OF DAILY LIVING (ADL)
ADLS_ACUITY_SCORE: 35
ADLS_ACUITY_SCORE: 35
ADLS_ACUITY_SCORE: 33
ADLS_ACUITY_SCORE: 35
ADLS_ACUITY_SCORE: 35

## 2024-03-29 ASSESSMENT — COLUMBIA-SUICIDE SEVERITY RATING SCALE - C-SSRS
1. IN THE PAST MONTH, HAVE YOU WISHED YOU WERE DEAD OR WISHED YOU COULD GO TO SLEEP AND NOT WAKE UP?: NO
6. HAVE YOU EVER DONE ANYTHING, STARTED TO DO ANYTHING, OR PREPARED TO DO ANYTHING TO END YOUR LIFE?: NO
2. HAVE YOU ACTUALLY HAD ANY THOUGHTS OF KILLING YOURSELF IN THE PAST MONTH?: NO

## 2024-03-29 NOTE — TELEPHONE ENCOUNTER
Spoke to pt with interp on the line-  requesting pt to come back and get chest ct/ covid swab done. Pt was unable to stay to get testing done- needing to do test asap. Pt expressed understanding and will come back to [facility]. When pt gets here, swab for covid-19, get CT and see provider.    KK 3/29/24

## 2024-03-29 NOTE — ED PROVIDER NOTES
Emergency Department Encounter   NAME: José Luis Bocanegra ; AGE: 64 year old female ; YOB: 1960 ; MRN: [id number] ; PCP: Bonnie Crain   ED PROVIDER: Estee Dexter PA-C    Evaluation Date & Time:   No admission date for patient encounter.    CHIEF COMPLAINT:  Shortness of Breath      Impression and Plan   Medical Decision Making    José Luis Bocanegra is a 64 year old female who presents for evaluation of shortness of breath, lower extremity swelling/pain, and joint pains for several days.  Was seen in the urgent care and had a D-dimer, BNP, uric acid, and influenza swab done.  Sent here for CT PE and COVID test.  Vitals unremarkable, not tachycardic, hypoxic, or tachypneic.  Satting at 97% in triage.  Blood pressure 143/89.  On exam, slightly tachypneic on my auscultation around 30.  Lungs clear to auscultation throughout.  Lower extremities with bilateral swelling and tenderness palpation of bilateral calves.  Cardiac exam unremarkable.    Differential diagnosis includes ACS, DVT/PE, pulmonary edema, pulmonary embolism, pneumonia, CHF. Emergency department work up included CBC, troponin, BNP, EKG, COVID/flu/RSV swab as well as bilateral lower extremity ultrasound and CT PE study.  Patient had BNP done at urgent care 121.  She has no history of heart failure and has no pitting edema at this point so I do not need to repeat this.  D-dimer done at urgent care 3.47. Patient was given Toradol for symptoms. Work up revealed no leukocytosis or anemia, normal electrolytes, stable kidney function at baseline, troponin 14 with an EKG that reveals normal sinus rhythm essentially rules out ACS in this patient.  CT PE study reveals no pneumonia, effusions, pulmonary embolism, or infiltrates.  Ultrasound of the lower extremities reveals a 3.1 cm DVT in the right gastrocnemius vein.  Given a dose of Eliquis here, this is what she had in the past for DVT.  After the medication was already given, patient expressed concerns about  coverage.  That her insurance actually preferred Xarelto.  I do think it is reasonable to switch this medication despite getting the first dose of Eliquis.  Starter pack was sent to the pharmacy.  On discharge, vitals do reveal oxygen lower than when she first came in - in the low 90s.  She is lying down sleeping in bed so this could be contributing.   With otherwise unremarkable workup here without evidence of infection, normal BNP, normal COVID/flu/RSV swab with no evidence of pneumonia or other findings on CT, will treat for DVT and have her follow-up with her primary care provider.      Patient was discharged in stable condition but instructed to return to the emergency department with any new or worsening of symptoms. Patient expressed understanding, feels comfortable, and is in agreement with this plan. All questions addressed prior to discharge.    Medical Decision Making  Obtained supplemental history:Supplemental history obtained?: Documented in chart  Reviewed external records: External records reviewed?: Documented in chart and Inpatient Record: Clinic visit to [facility] for generalized joint pain, stomach pain, and chills on 3/29/2024 and US of bilateral lower extremity on 1/10/23  Care impacted by chronic illness:Hypertension and Other: GERD morbid obesity  Care significantly affected by social determinants of health:Access to Affordable Health Care  Did you consider but not order tests?: Work up considered but not performed and documented in chart, if applicable  Did you interpret images independently?: Independent interpretation of ECG and images noted in documentation, when applicable.  Consultation discussion with other provider:Did you involve another provider (consultant, , pharmacy, etc.)?: I discussed the care with another health care provider, see documentation for details.  Discharge. I prescribed additional prescription strength medication(s) as charted. See  documentation for any additional details.      ED COURSE:  6:18 PM I met and introduced myself to the patient. I gathered initial history and performed my physical exam. We discussed plan for initial workup.   6:43 PM I have staffed the patient with Dr. Rizzo, ED MD, who has evaluated the patient and agrees with all aspects of today's care.   10:28 PM We discussed the plan for discharge and the patient is agreeable. Reviewed supportive cares, symptomatic treatment, outpatient follow up, and reasons to return to the Emergency Department. Patient to be discharged by ED RN.    ED Course as of 03/29/24 2344   Fri Mar 29, 2024   1756 D-Dimer from today: 3.47   1843 I discussed the case with Estee Dexter PA-C.   2011 Patient is a pleasant 64-year-old female comes in today for evaluation of leg pain arm and body pains.  All the symptoms started yesterday.  She was initially seen in clinic and negative D-dimer that came back elevated at 3.47 and sent her straight here.  She is very difficult IV stick and we have not been able to get an appropriate IV.  She says she has been short of breath for the last year.  When asked her for change she says may be slightly but not much.  She is not hypoxic.  She is not tachycardic here.  We can have the PICC nurse try to put in a line but I think we can start with ultrasound imaging and if she is got DVT then we can just stop there.  I discussed it with the patient.  She is in agreement with the plan.   2101 CTPE negative for pulmonary embolism   2119 Patient is a 3.5 cm DVT in the right gastrocnemius vein.  Her CT scan of the chest was negative.  We will start treatment and get her discharged home.        FINAL IMPRESSION:    ICD-10-CM    1. Acute deep vein thrombosis (DVT) of right lower extremity, unspecified vein (H)  I82.401           MEDICATIONS GIVEN IN THE EMERGENCY DEPARTMENT:  Medications   iopamidol (ISOVUE-370) solution 70 mL (70 mLs Intravenous $Given 3/29/24 2045)    ketorolac (TORADOL) injection 15 mg (15 mg Intravenous $Given 3/29/24 2117)   apixaban ANTICOAGULANT (ELIQUIS) tablet 10 mg (10 mg Oral $Given 3/29/24 2215)       NEW PRESCRIPTIONS STARTED AT TODAY'S ED VISIT:  Discharge Medication List as of 3/29/2024 11:16 PM        START taking these medications    Details   Rivaroxaban ANTICOAGULANT 15 & 20 MG TBPK Starter Therapy Pack Take 15 mg by mouth 2 times daily (with meals) for 21 days, THEN 20 mg daily with food for 9 days., Disp-51 each, R-0, E-Prescribe             HPI   Patient information was obtained from: Patient   Use of Intrepreter: Yes (Phone) - Language: Daphney Bocanegra is a 64 year old female with a pertinent history of HTN, GERD, DVT (1 year ago), and morbid obesity, who presents to the ED by walk-in for evaluation of shortness of breath.     Per chart review, the patient presented to [facility] for generalized joint pain, stomach pain, and chills on 3/29/2024. Reported 1 day of body aches/general joint pains, stomach pain, and chills. Stated she recently completed a course of steroids. She has been on statin for over a year. Influenza A and B was tested at clinic and was negative. D-dimer was elevated at 3.47 and was advised to go to [facility] ED for COVID testing and CTPE. Recommended to continue Allopurinol daily.    Per chart review: Patient had a bilateral lower extremity venous duplex US on 1/10/23. US showed approximate 5 cm segment of acute occlusive DVT involving both of the paired gastrocnemius veins in the upper right calf. Both lower extremities were otherwise negative for DVT.    Patient reports ongoing shortness of breath since 10/2023 and notes worsening shortness of breath the past 2 days with 2 days of associated generalized body aches followed by chills. Notes intermittent pain with taking a deep breath, but denies symptom now. Reports 3 months of bilateral lower extremity swelling and has been taking  medications to treat it, including the past few days, with improvement and no recent increased swelling. Notes bilateral lower extremity pain with more pain in the left side. Notes a hx of DVT in right thigh 1 year ago and was started on blood thinner then. History of pneumonia of right middle lobe showed on chest XR on 2/2020, reporting shortness of breath with pneumonia, but symptoms now is more worse. No personal hx of PE.    Denies associated sore throat, cough, fevers, chest pian, runny nose, or bilateral lower extremity cramping with movement or walking. No recent long car rides, plane travel the last few weeks, surgical procedures the past several weeks, or recent admissions. Not a smoker. No other reported complaints or concerns at this time.      Medical History     Past Medical History:   Diagnosis Date    Benign essential hypertension 2/20/2020    GERD (gastroesophageal reflux disease) 2/20/2020    Other insomnia 2/20/2020       No past surgical history on file.    No family history on file.    Social History     Tobacco Use    Smoking status: Never    Smokeless tobacco: Never       Rivaroxaban ANTICOAGULANT 15 & 20 MG TBPK Starter Therapy Pack  acetaminophen (TYLENOL) 325 MG tablet  allopurinol (ZYLOPRIM) 100 MG tablet  ALMACONE -400-40 MG/5ML SUSP suspension  atorvastatin (LIPITOR) 20 MG tablet  FLUoxetine (PROZAC) 40 MG capsule  glipiZIDE (GLUCOTROL XL) 10 MG 24 hr tablet  ibuprofen (IBU) 400 MG tablet  LANsoprazole (PREVACID) 30 MG DR capsule  lisinopril (PRINIVIL,ZESTRIL) 10 MG tablet  losartan (COZAAR) 50 MG tablet  metFORMIN (GLUCOPHAGE-XR) 500 MG 24 hr tablet  omeprazole (PRILOSEC) 20 MG capsule  ondansetron (ZOFRAN ODT) 4 MG ODT tab  ranitidine (ZANTAC) 300 MG tablet  SYSTANE ULTRA 0.4-0.3 % SOLN ophthalmic solution  TRADJENTA 5 MG TABS tablet  traZODone (DESYREL) 50 MG tablet        Physical Exam     First Vitals:  Patient Vitals for the past 24 hrs:   BP Temp Temp src Pulse Resp SpO2  "Height Weight   03/29/24 2230 (!) 177/82 -- -- 97 -- 91 % -- --   03/29/24 2215 (!) 184/86 -- -- 93 26 92 % -- --   03/29/24 2131 (!) 158/95 -- -- 91 26 93 % -- --   03/29/24 2126 (!) 166/87 -- -- 91 26 93 % -- --   03/29/24 2004 (!) 181/91 -- -- 89 -- 95 % -- --   03/29/24 1914 (!) 176/103 -- -- 91 -- 96 % -- --   03/29/24 1911 (!) 184/101 -- -- 92 -- 97 % -- --   03/29/24 1808 (!) 143/89 100  F (37.8  C) Temporal 97 26 97 % 1.499 m (4' 11\") 90.3 kg (199 lb)       PHYSICAL EXAM:   Physical Exam  Constitutional:       General: She is not in acute distress.     Appearance: She is well-developed. She is not ill-appearing.      Interventions: She is not intubated.  HENT:      Mouth/Throat:      Mouth: Mucous membranes are moist.      Pharynx: No pharyngeal swelling or oropharyngeal exudate.   Cardiovascular:      Rate and Rhythm: Normal rate and regular rhythm.   Pulmonary:      Effort: Pulmonary effort is normal. No accessory muscle usage or respiratory distress. She is not intubated.      Breath sounds: Normal breath sounds. No decreased breath sounds, wheezing, rhonchi or rales.   Chest:      Chest wall: No tenderness.   Abdominal:      Palpations: Abdomen is soft.   Musculoskeletal:      Right lower leg: Tenderness present. Edema present.      Left lower leg: Tenderness present. Edema present.   Skin:     General: Skin is warm.      Capillary Refill: Capillary refill takes less than 2 seconds.      Findings: No ecchymosis, erythema or rash.   Neurological:      General: No focal deficit present.      Mental Status: She is alert and oriented to person, place, and time.           Results     LAB:  All pertinent labs reviewed and interpreted  Labs Ordered and Resulted from Time of ED Arrival to Time of ED Departure   BASIC METABOLIC PANEL - Abnormal       Result Value    Sodium 138      Potassium 4.2      Chloride 102      Carbon Dioxide (CO2) 24      Anion Gap 12      Urea Nitrogen 18.0      Creatinine 1.27 (*)     " GFR Estimate 47 (*)     Calcium 9.0      Glucose 153 (*)    CBC WITH PLATELETS AND DIFFERENTIAL - Abnormal    WBC Count 10.7      RBC Count 4.50      Hemoglobin 12.4      Hematocrit 39.6      MCV 88      MCH 27.6      MCHC 31.3 (*)     RDW 13.2      Platelet Count 181      % Neutrophils 62      % Lymphocytes 28      % Monocytes 9      % Eosinophils 1      % Basophils 0      % Immature Granulocytes 0      NRBCs per 100 WBC 0      Absolute Neutrophils 6.5      Absolute Lymphocytes 3.0      Absolute Monocytes 1.0      Absolute Eosinophils 0.1      Absolute Basophils 0.0      Absolute Immature Granulocytes 0.0      Absolute NRBCs 0.0     INFLUENZA A/B, RSV, & SARS-COV2 PCR - Normal    Influenza A PCR Negative      Influenza B PCR Negative      RSV PCR Negative      SARS CoV2 PCR Negative     TROPONIN T, HIGH SENSITIVITY - Normal    Troponin T, High Sensitivity 14         RADIOLOGY:  US Lower Extremity Venous Duplex Bilateral   Final Result   IMPRESSION:   1.  3.5 cm occlusive deep venous thrombosis below the knee in the right gastrocnemius veins.   2.  No other DVT.         CT Chest Pulmonary Embolism w Contrast   Final Result   IMPRESSION:   1.  No evidence for pulmonary emboli.   2.  Chronic right pleural reaction and right lung volume loss, unchanged.   3.  No new pulmonary disease.          ECG:    Performed at: 3/29/2024 18:19:07  Impression: Sinus rhythm with Fusion complexes. Biatrial enlargement. Inferior infarct, age undetermined.    Rate: 95 BPM  Rhythm: Sinus  Axis: 47  UT Interval: 170 ms  QRS Interval: 104 ms  QTc Interval: 462 ms  ST Changes: No ST changes.  Comparison: When compared with ECG of 1/10/2023 13:20, Fusion complexes are now present. Questionable change in QRS axis. Inferior infarct is now present.    EKG results reviewed and interpreted by Dr. So ED MD.       Vidhi FREEMAN, am serving as a scribe to document services personally performed by Estee Dexter PA-C, based on my  observations and the provider's statements to me. I, Estee Dexter PA-C, attest that Vidhi Fowler is acting in a scribe capacity, has observed my performance of the services and has documented them in accordance with my direction.    Estee Dexter PA-C  Emergency Medicine   [facility] EMERGENCY DEPARTMENT       Estee Dexter PA-C  03/29/24 8993

## 2024-03-29 NOTE — ED PROVIDER NOTES
Emergency Department Staff Note  I had a face to face encounter with this patient seen by the Advanced Practice Provider (BURT).  I personally made/approved the management plan and take responsibility for the patient management. I personally saw the patient and performed a substantive portion of the visit including all aspects of the medical decision making.      ED Course as of 03/29/24 2210   Fri Mar 29, 2024   1756 D-Dimer from today: 3.47   1843 I discussed the case with Estee Dexter PA-C.   2011 Patient is a pleasant 64-year-old female comes in today for evaluation of leg pain arm and body pains.  All the symptoms started yesterday.  She was initially seen in clinic and negative D-dimer that came back elevated at 3.47 and sent her straight here.  She is very difficult IV stick and we have not been able to get an appropriate IV.  She says she has been short of breath for the last year.  When asked her for change she says may be slightly but not much.  She is not hypoxic.  She is not tachycardic here.  We can have the PICC nurse try to put in a line but I think we can start with ultrasound imaging and if she is got DVT then we can just stop there.  I discussed it with the patient.  She is in agreement with the plan.   2101 CTPE negative for pulmonary embolism   2119 Patient is a 3.5 cm DVT in the right gastrocnemius vein.  Her CT scan of the chest was negative.  We will start treatment and get her discharged home.       EKG  Date and time: March 29, 2024 at 1819  Rate: 95 bpm  Rhythm: Sinus rhythm with fusion complexes  CT interval: 170 ms  QRS interval: 104 ms  QT/QTc: 368/462 ms  ST changes or T wave changes: No acute ST or T wave abnormalities  Change from prior ECG: No prior to compare to  I have independently reviewed and interpreted this EKG.     I independently interpreted the following study(s): CT pulmonary embolism scan showed no large PE.  See full radiology report for all details.    [facility]  [facility] EMERGENCY DEPARTMENT  MD So Morfin, Cathleen Quinn MD  03/29/24 3155

## 2024-03-29 NOTE — PROGRESS NOTES
Assessment & Plan     Generalized joint pain  - Influenza A & B Antigen - Clinic Collect  - Uric acid    José Luis presents for 1d of body aches/general joint pains, stomach pain, and chills. She thinks that this is a current gout flare but recently completed a course of steroids. José Luis has been on a statin for over a year so that is unlikely the cause of her symptoms. Influenza negative. My exam did not show any erythema or swelling of wrist, ankle, knee, toe, finger or elbow joints so will order uric acid level prior to initiating gout flare treatment. This was also reassuring against septic arthritis.   - continue Allopurinol daily  - follow up with PCP    Orthopnea  Elevated d-dimer  Shortness of breath  - BNP-N terminal pro  - D dimer, quantitative    While I did not find an etiology for José Luis's presenting symptoms, on my exam, she did have significant dyspnea, LE swelling and an O2 of 94% which is below her baseline. Of note, she had a DVT 1 year ago and was on a course of Eliquis but had no physical signs of DVT today. Age-adjusted D-dimer came back significantly elevated so that patient was called and notified that she should return for a COVID test (can cause elevated ddimer) and CTPE however the imaging department was unable to accommodate her scan so she will be assessed at [facility] ED.     Return for In clinic with your primary care provider.    Mary Hilliard, DO  she/her  [facility]    Subjective     José Luis Bocanegra is a 64 year old female who presents to clinic today for the following health issues:    HPI    For the last two years, has hand and feet pain that comes and goes  1 day ago, she started having body aches with chills, stomach pain  Has had decreased oral intake due to abdominal pain the last day  No night sweats, vomiting, cough, diarrhea, headache  No one at home is sick  Has tried no medications    Has a h/o gout; per patient, last flare was 1 week ago and took course of  prednisone    Past Medical History:   Diagnosis Date    Benign essential hypertension 2/20/2020    GERD (gastroesophageal reflux disease) 2/20/2020    Other insomnia 2/20/2020     No Known Allergies  Current Outpatient Medications   Medication    acetaminophen (TYLENOL) 325 MG tablet    allopurinol (ZYLOPRIM) 100 MG tablet    ALMACONE -400-40 MG/5ML SUSP suspension    atorvastatin (LIPITOR) 20 MG tablet    FLUoxetine (PROZAC) 40 MG capsule    glipiZIDE (GLUCOTROL XL) 10 MG 24 hr tablet    ibuprofen (IBU) 400 MG tablet    LANsoprazole (PREVACID) 30 MG DR capsule    lisinopril (PRINIVIL,ZESTRIL) 10 MG tablet    losartan (COZAAR) 50 MG tablet    metFORMIN (GLUCOPHAGE-XR) 500 MG 24 hr tablet    omeprazole (PRILOSEC) 20 MG capsule    ondansetron (ZOFRAN ODT) 4 MG ODT tab    ranitidine (ZANTAC) 300 MG tablet    SYSTANE ULTRA 0.4-0.3 % SOLN ophthalmic solution    TRADJENTA 5 MG TABS tablet    traZODone (DESYREL) 50 MG tablet     No current facility-administered medications for this visit.      Review of Systems  Constitutional, HEENT, cardiovascular, pulmonary, gi and gu systems are negative, except as otherwise noted.      Objective    BP (!) 148/86   Pulse 92   Temp 98.5  F (36.9  C) (Oral)   Resp 18   SpO2 94%     Physical Exam  Constitutional:       Appearance: She is obese.      Comments: Present with    HENT:      Right Ear: Tympanic membrane normal.      Left Ear: Tympanic membrane normal.      Mouth/Throat:      Mouth: Mucous membranes are moist.      Pharynx: No oropharyngeal exudate or posterior oropharyngeal erythema.   Eyes:      Pupils: Pupils are equal, round, and reactive to light.   Cardiovascular:      Rate and Rhythm: Normal rate and regular rhythm.   Pulmonary:      Effort: Tachypnea present. No respiratory distress.      Breath sounds: No wheezing, rhonchi or rales.      Comments: Increased respiratory effort  Musculoskeletal:      Right shoulder: Normal. No swelling or effusion.       Left shoulder: Normal. No swelling or effusion.      Right elbow: Normal. No swelling or deformity.      Left elbow: Normal. No swelling or deformity.      Right wrist: Normal. No swelling, effusion or tenderness.      Left wrist: Normal. No swelling, effusion or tenderness.      Right hand: Normal. No swelling, deformity or bony tenderness.      Left hand: Normal. No swelling, deformity or bony tenderness.      Right lower leg: No tenderness. Edema present.      Left lower leg: No tenderness. Edema present.      Right ankle: No tenderness.      Left ankle: No tenderness.      Right foot: No bony tenderness.      Left foot: No bony tenderness.      Comments: No erythema, swelling, or tenderness to palpation in bilateral popliteal fossas   Skin:     General: Skin is warm.   Neurological:      Mental Status: She is alert.        Results for orders placed or performed in visit on 03/29/24   D dimer, quantitative     Status: Abnormal   Result Value Ref Range    D-Dimer Quantitative 3.47 (H) 0.00 - 0.50 ug/mL FEU    Narrative    This D-dimer assay is intended for use in conjunction with a clinical pretest probability assessment model to exclude pulmonary embolism (PE) and deep venous thrombosis (DVT) in outpatients suspected of PE or DVT. The cut-off value is 0.50 ug/mL FEU.    For patients 50 years of age or older, the application of age-adjusted cut-off values for D-Dimer may increase the specificity without significant effect on sensitivity. The literature suggested calculation age adjusted cut-off in ug/L = age in years x 10 ug/L. The results in this laboratory are reported as ug/mL rather than ug/L. The calculation for age adjusted cut off in ug/mL= age in years x 0.01 ug/mL. For example, the cut off for a 76 year old male is 77 x 0.01 ug/mL = 0.76 ug/mL (760 ug/L).    M Scott et al. Age adjusted D-dimer cut-off levels to rule out pulmonary embolism: The ADJUST-PE Study. GHULAM 2014;311:8997-3590.; ELICEO López  al. Diagnostic accuracy of conventional or age adjusted D-dimer cutoff values in older patients with suspected venous thromboembolism. Systemic review and meta-analysis. BMJ 2013:346:f2492.   Influenza A & B Antigen - Clinic Collect     Status: Normal    Specimen: Nose; Swab   Result Value Ref Range    Influenza A antigen Negative Negative    Influenza B antigen Negative Negative    Narrative    Test results must be correlated with clinical data. If necessary, results should be confirmed by a molecular assay or viral culture.           The use of Dragon/PowerMic dictation services may have been used to construct the content in this note; any grammatical or spelling errors are non-intentional. Please contact the author of this note directly if you are in need of any clarification.

## 2024-03-29 NOTE — PATIENT INSTRUCTIONS
Your influenza was negative. Your other labs (uric acid, pro-bnp, d dimer) are pending. I will call you if anything means you need medication or further evaluation    We have ruled out an emergency issue and I recommend following up with your PCP to discuss ongoing joint pain

## 2024-03-29 NOTE — ED TRIAGE NOTES
Hmong speaking pt reports gen body aches and short of breath for 2 days.     Triage Assessment (Adult)       Row Name 03/29/24 1812          Triage Assessment    Airway WDL WDL        Respiratory WDL    Respiratory WDL rhythm/pattern     Rhythm/Pattern, Respiratory tachypneic        Skin Circulation/Temperature WDL    Skin Circulation/Temperature WDL WDL        Peripheral/Neurovascular WDL    Peripheral Neurovascular WDL X  pt reports rt ankle pain and Rt wrist pain is worst pain        Cognitive/Neuro/Behavioral WDL    Cognitive/Neuro/Behavioral WDL WDL

## 2024-03-30 NOTE — DISCHARGE INSTRUCTIONS
You are seen here in the emergency department for swelling in your legs.  The ultrasound of your leg revealed a blood clot in the right leg.  We gave your first dose of anticoagulant medications here.  We will send you home with a prescription of this.  This is just the starter pack, you will need to schedule an appointment with your primary care provider to get a refill and finish the total full course of medication which is usually around 3 months.  Please follow-up with your primary care provider.  Please watch for signs of chest pain, worsening shortness of breath, or any other concerning symptoms.

## 2024-03-31 LAB
ATRIAL RATE - MUSE: 95 BPM
DIASTOLIC BLOOD PRESSURE - MUSE: NORMAL MMHG
INTERPRETATION ECG - MUSE: NORMAL
P AXIS - MUSE: 47 DEGREES
PR INTERVAL - MUSE: 170 MS
QRS DURATION - MUSE: 104 MS
QT - MUSE: 368 MS
QTC - MUSE: 462 MS
R AXIS - MUSE: 47 DEGREES
SYSTOLIC BLOOD PRESSURE - MUSE: NORMAL MMHG
T AXIS - MUSE: 37 DEGREES
VENTRICULAR RATE- MUSE: 95 BPM

## 2024-04-14 ENCOUNTER — APPOINTMENT (OUTPATIENT)
Dept: CT IMAGING | Facility: HOSPITAL | Age: 64
End: 2024-04-14
Attending: PHYSICIAN ASSISTANT
Payer: COMMERCIAL

## 2024-04-14 ENCOUNTER — APPOINTMENT (OUTPATIENT)
Dept: RADIOLOGY | Facility: HOSPITAL | Age: 64
End: 2024-04-14
Attending: PHYSICIAN ASSISTANT
Payer: COMMERCIAL

## 2024-04-14 ENCOUNTER — APPOINTMENT (OUTPATIENT)
Dept: ULTRASOUND IMAGING | Facility: HOSPITAL | Age: 64
End: 2024-04-14
Attending: PHYSICIAN ASSISTANT
Payer: COMMERCIAL

## 2024-04-14 ENCOUNTER — HOSPITAL ENCOUNTER (INPATIENT)
Facility: HOSPITAL | Age: 64
LOS: 4 days | Discharge: HOME | End: 2024-04-18
Attending: EMERGENCY MEDICINE | Admitting: HOSPITALIST
Payer: COMMERCIAL

## 2024-04-14 DIAGNOSIS — I27.82 CHRONIC PULMONARY EMBOLISM WITHOUT ACUTE COR PULMONALE, UNSPECIFIED PULMONARY EMBOLISM TYPE (H): ICD-10-CM

## 2024-04-14 DIAGNOSIS — Z91.148 NONCOMPLIANCE WITH MEDICATION REGIMEN: ICD-10-CM

## 2024-04-14 DIAGNOSIS — M79.89 SWELLING OF RIGHT HAND: ICD-10-CM

## 2024-04-14 DIAGNOSIS — K21.00 GASTROESOPHAGEAL REFLUX DISEASE WITH ESOPHAGITIS, UNSPECIFIED WHETHER HEMORRHAGE: ICD-10-CM

## 2024-04-14 DIAGNOSIS — I10 BENIGN ESSENTIAL HYPERTENSION: ICD-10-CM

## 2024-04-14 DIAGNOSIS — L03.90 CELLULITIS: ICD-10-CM

## 2024-04-14 DIAGNOSIS — E87.20 LACTIC ACIDOSIS: ICD-10-CM

## 2024-04-14 DIAGNOSIS — E66.01 MORBID OBESITY (H): Primary | ICD-10-CM

## 2024-04-14 LAB
ANION GAP SERPL CALCULATED.3IONS-SCNC: 14 MMOL/L (ref 7–15)
BASOPHILS # BLD AUTO: 0 10E3/UL (ref 0–0.2)
BASOPHILS NFR BLD AUTO: 0 %
BUN SERPL-MCNC: 14.6 MG/DL (ref 8–23)
CALCIUM SERPL-MCNC: 8.9 MG/DL (ref 8.8–10.2)
CHLORIDE SERPL-SCNC: 103 MMOL/L (ref 98–107)
CREAT SERPL-MCNC: 1.55 MG/DL (ref 0.51–0.95)
CRP SERPL-MCNC: 50.6 MG/L
DEPRECATED HCO3 PLAS-SCNC: 23 MMOL/L (ref 22–29)
EGFRCR SERPLBLD CKD-EPI 2021: 37 ML/MIN/1.73M2
EOSINOPHIL # BLD AUTO: 0.2 10E3/UL (ref 0–0.7)
EOSINOPHIL NFR BLD AUTO: 2 %
ERYTHROCYTE [DISTWIDTH] IN BLOOD BY AUTOMATED COUNT: 13.4 % (ref 10–15)
ERYTHROCYTE [SEDIMENTATION RATE] IN BLOOD BY WESTERGREN METHOD: 63 MM/HR (ref 0–30)
GLUCOSE SERPL-MCNC: 262 MG/DL (ref 70–99)
HCT VFR BLD AUTO: 41.4 % (ref 35–47)
HGB BLD-MCNC: 13 G/DL (ref 11.7–15.7)
HOLD SPECIMEN: NORMAL
IMM GRANULOCYTES # BLD: 0 10E3/UL
IMM GRANULOCYTES NFR BLD: 0 %
LACTATE SERPL-SCNC: 2.2 MMOL/L (ref 0.7–2)
LACTATE SERPL-SCNC: 3.3 MMOL/L (ref 0.7–2)
LYMPHOCYTES # BLD AUTO: 2.6 10E3/UL (ref 0.8–5.3)
LYMPHOCYTES NFR BLD AUTO: 25 %
MCH RBC QN AUTO: 27.8 PG (ref 26.5–33)
MCHC RBC AUTO-ENTMCNC: 31.4 G/DL (ref 31.5–36.5)
MCV RBC AUTO: 89 FL (ref 78–100)
MONOCYTES # BLD AUTO: 0.9 10E3/UL (ref 0–1.3)
MONOCYTES NFR BLD AUTO: 8 %
NEUTROPHILS # BLD AUTO: 6.6 10E3/UL (ref 1.6–8.3)
NEUTROPHILS NFR BLD AUTO: 65 %
NRBC # BLD AUTO: 0 10E3/UL
NRBC BLD AUTO-RTO: 0 /100
PLATELET # BLD AUTO: 245 10E3/UL (ref 150–450)
POTASSIUM SERPL-SCNC: 4.1 MMOL/L (ref 3.4–5.3)
RADIOLOGIST FLAGS: NORMAL
RBC # BLD AUTO: 4.68 10E6/UL (ref 3.8–5.2)
SODIUM SERPL-SCNC: 140 MMOL/L (ref 135–145)
WBC # BLD AUTO: 10.2 10E3/UL (ref 4–11)

## 2024-04-14 PROCEDURE — 87186 SC STD MICRODIL/AGAR DIL: CPT | Performed by: PHYSICIAN ASSISTANT

## 2024-04-14 PROCEDURE — 85652 RBC SED RATE AUTOMATED: CPT | Performed by: EMERGENCY MEDICINE

## 2024-04-14 PROCEDURE — 99222 1ST HOSP IP/OBS MODERATE 55: CPT | Performed by: HOSPITALIST

## 2024-04-14 PROCEDURE — 250N000011 HC RX IP 250 OP 636: Performed by: PHYSICIAN ASSISTANT

## 2024-04-14 PROCEDURE — 80048 BASIC METABOLIC PNL TOTAL CA: CPT | Performed by: PHYSICIAN ASSISTANT

## 2024-04-14 PROCEDURE — 258N000003 HC RX IP 258 OP 636: Performed by: PHYSICIAN ASSISTANT

## 2024-04-14 PROCEDURE — 96365 THER/PROPH/DIAG IV INF INIT: CPT | Mod: 59

## 2024-04-14 PROCEDURE — 250N000013 HC RX MED GY IP 250 OP 250 PS 637: Performed by: PHYSICIAN ASSISTANT

## 2024-04-14 PROCEDURE — 87149 DNA/RNA DIRECT PROBE: CPT | Performed by: PHYSICIAN ASSISTANT

## 2024-04-14 PROCEDURE — 83036 HEMOGLOBIN GLYCOSYLATED A1C: CPT | Performed by: HOSPITALIST

## 2024-04-14 PROCEDURE — 36415 COLL VENOUS BLD VENIPUNCTURE: CPT | Performed by: EMERGENCY MEDICINE

## 2024-04-14 PROCEDURE — 36415 COLL VENOUS BLD VENIPUNCTURE: CPT | Performed by: PHYSICIAN ASSISTANT

## 2024-04-14 PROCEDURE — 85025 COMPLETE CBC W/AUTO DIFF WBC: CPT | Performed by: EMERGENCY MEDICINE

## 2024-04-14 PROCEDURE — 93971 EXTREMITY STUDY: CPT | Mod: RT

## 2024-04-14 PROCEDURE — 99285 EMERGENCY DEPT VISIT HI MDM: CPT | Mod: 25

## 2024-04-14 PROCEDURE — 73130 X-RAY EXAM OF HAND: CPT | Mod: RT

## 2024-04-14 PROCEDURE — 71275 CT ANGIOGRAPHY CHEST: CPT

## 2024-04-14 PROCEDURE — 83605 ASSAY OF LACTIC ACID: CPT | Performed by: EMERGENCY MEDICINE

## 2024-04-14 PROCEDURE — 96361 HYDRATE IV INFUSION ADD-ON: CPT

## 2024-04-14 PROCEDURE — 120N000001 HC R&B MED SURG/OB

## 2024-04-14 PROCEDURE — 86140 C-REACTIVE PROTEIN: CPT | Performed by: EMERGENCY MEDICINE

## 2024-04-14 RX ORDER — LISINOPRIL 20 MG/1
20 TABLET ORAL DAILY
COMMUNITY
Start: 2024-02-14

## 2024-04-14 RX ORDER — CEFTRIAXONE 1 G/1
1 INJECTION, POWDER, FOR SOLUTION INTRAMUSCULAR; INTRAVENOUS ONCE
Status: COMPLETED | OUTPATIENT
Start: 2024-04-14 | End: 2024-04-15

## 2024-04-14 RX ORDER — IOPAMIDOL 755 MG/ML
75 INJECTION, SOLUTION INTRAVASCULAR ONCE
Status: COMPLETED | OUTPATIENT
Start: 2024-04-14 | End: 2024-04-14

## 2024-04-14 RX ORDER — ACETAMINOPHEN 325 MG/1
325-650 TABLET ORAL EVERY 6 HOURS PRN
COMMUNITY

## 2024-04-14 RX ORDER — COLCHICINE 0.6 MG/1
1.2 TABLET ORAL ONCE
Status: COMPLETED | OUTPATIENT
Start: 2024-04-14 | End: 2024-04-14

## 2024-04-14 RX ORDER — COLCHICINE 0.6 MG/1
0.6 TABLET ORAL 2 TIMES DAILY
Qty: 10 TABLET | Refills: 0 | Status: SHIPPED | OUTPATIENT
Start: 2024-04-14 | End: 2024-04-14

## 2024-04-14 RX ORDER — COLCHICINE 0.6 MG/1
1.2 TABLET ORAL DAILY
Status: DISCONTINUED | OUTPATIENT
Start: 2024-04-15 | End: 2024-04-14

## 2024-04-14 RX ADMIN — COLCHICINE 1.2 MG: 0.6 TABLET, FILM COATED ORAL at 21:48

## 2024-04-14 RX ADMIN — IOPAMIDOL 75 ML: 755 INJECTION, SOLUTION INTRAVENOUS at 21:34

## 2024-04-14 RX ADMIN — CEFTRIAXONE SODIUM 1 G: 1 INJECTION, POWDER, FOR SOLUTION INTRAMUSCULAR; INTRAVENOUS at 23:01

## 2024-04-14 RX ADMIN — SODIUM CHLORIDE 1000 ML: 9 INJECTION, SOLUTION INTRAVENOUS at 20:42

## 2024-04-14 ASSESSMENT — ACTIVITIES OF DAILY LIVING (ADL)
ADLS_ACUITY_SCORE: 35
ADLS_ACUITY_SCORE: 33
ADLS_ACUITY_SCORE: 35

## 2024-04-14 NOTE — LETTER
[address] [address]  Phone: [phone]  Fax: [phone]    April 16, 2024          To whom it may concern:    Please excuse Ms. Terri Bryan from work today. Her mother is in the hospital.    Please contact me for questions or concerns.      Sincerely,      Yury Hutchison, DO

## 2024-04-14 NOTE — LETTER
[facility]  [address] [address]  Phone: [phone]  Fax: [phone]    April 17, 2024        José Luis Bocanegra  [address] [address]          To whom it may concern:    RE: José Luis Bocanegra    The above named individual was admitted to [facility] on April 14, 2024, and is currently still hospitalized. Her daughter has been assisting with her care.    Please contact me for questions or concerns.      Sincerely,      Emilie Perry MD

## 2024-04-14 NOTE — ED TRIAGE NOTES
Pt here recently and diagnosed with gout and a blood clot in her leg. Pt has been taking her prescribed medications for the gout, not for the blood clot yet. Last few days pt has head increased swelling to R hand.  Pt's hand is red, swollen, and warm to the touch.  No open wounds noted to hand.  Pt denies being bitten by any animals or bugs to her knowledge.      Triage Assessment (Adult)       Row Name 04/14/24 3046          Triage Assessment    Airway WDL WDL        Respiratory WDL    Respiratory WDL WDL        Skin Circulation/Temperature WDL    Skin Circulation/Temperature WDL WDL        Cardiac WDL    Cardiac WDL WDL        Peripheral/Neurovascular WDL    Peripheral Neurovascular WDL WDL        Cognitive/Neuro/Behavioral WDL    Cognitive/Neuro/Behavioral WDL WDL

## 2024-04-15 ENCOUNTER — VIRTUAL VISIT (OUTPATIENT)
Dept: INTERPRETER SERVICES | Facility: CLINIC | Age: 64
End: 2024-04-15
Payer: COMMERCIAL

## 2024-04-15 ENCOUNTER — APPOINTMENT (OUTPATIENT)
Dept: ULTRASOUND IMAGING | Facility: HOSPITAL | Age: 64
End: 2024-04-15
Attending: PHYSICIAN ASSISTANT
Payer: COMMERCIAL

## 2024-04-15 ENCOUNTER — APPOINTMENT (OUTPATIENT)
Dept: MRI IMAGING | Facility: HOSPITAL | Age: 64
End: 2024-04-15
Attending: HOSPITALIST
Payer: COMMERCIAL

## 2024-04-15 DIAGNOSIS — I74.9 CHRONIC THROMBOEMBOLIC DISEASE (H): Primary | ICD-10-CM

## 2024-04-15 LAB
ANION GAP SERPL CALCULATED.3IONS-SCNC: 10 MMOL/L (ref 7–15)
BUN SERPL-MCNC: 12.5 MG/DL (ref 8–23)
CALCIUM SERPL-MCNC: 8.7 MG/DL (ref 8.8–10.2)
CHLORIDE SERPL-SCNC: 107 MMOL/L (ref 98–107)
CREAT SERPL-MCNC: 1.29 MG/DL (ref 0.51–0.95)
DEPRECATED HCO3 PLAS-SCNC: 22 MMOL/L (ref 22–29)
EGFRCR SERPLBLD CKD-EPI 2021: 46 ML/MIN/1.73M2
ENTEROCOCCUS FAECALIS: NOT DETECTED
ENTEROCOCCUS FAECIUM: NOT DETECTED
ERYTHROCYTE [DISTWIDTH] IN BLOOD BY AUTOMATED COUNT: 13.5 % (ref 10–15)
GLUCOSE BLDC GLUCOMTR-MCNC: 125 MG/DL (ref 70–99)
GLUCOSE BLDC GLUCOMTR-MCNC: 127 MG/DL (ref 70–99)
GLUCOSE BLDC GLUCOMTR-MCNC: 140 MG/DL (ref 70–99)
GLUCOSE BLDC GLUCOMTR-MCNC: 144 MG/DL (ref 70–99)
GLUCOSE BLDC GLUCOMTR-MCNC: 164 MG/DL (ref 70–99)
GLUCOSE SERPL-MCNC: 158 MG/DL (ref 70–99)
HBA1C MFR BLD: 9.2 %
HCT VFR BLD AUTO: 36.9 % (ref 35–47)
HGB BLD-MCNC: 11.8 G/DL (ref 11.7–15.7)
LISTERIA SPECIES (DETECTED/NOT DETECTED): NOT DETECTED
MCH RBC QN AUTO: 28 PG (ref 26.5–33)
MCHC RBC AUTO-ENTMCNC: 32 G/DL (ref 31.5–36.5)
MCV RBC AUTO: 87 FL (ref 78–100)
PLATELET # BLD AUTO: 241 10E3/UL (ref 150–450)
POTASSIUM SERPL-SCNC: 4 MMOL/L (ref 3.4–5.3)
RBC # BLD AUTO: 4.22 10E6/UL (ref 3.8–5.2)
SODIUM SERPL-SCNC: 139 MMOL/L (ref 135–145)
STAPHYLOCOCCUS AUREUS: NOT DETECTED
STAPHYLOCOCCUS EPIDERMIDIS: DETECTED
STAPHYLOCOCCUS LUGDUNENSIS: NOT DETECTED
STREPTOCOCCUS AGALACTIAE: NOT DETECTED
STREPTOCOCCUS ANGINOSUS GROUP: NOT DETECTED
STREPTOCOCCUS PNEUMONIAE: NOT DETECTED
STREPTOCOCCUS PYOGENES: NOT DETECTED
STREPTOCOCCUS SPECIES: NOT DETECTED
WBC # BLD AUTO: 10.7 10E3/UL (ref 4–11)

## 2024-04-15 PROCEDURE — 120N000001 HC R&B MED SURG/OB

## 2024-04-15 PROCEDURE — 250N000012 HC RX MED GY IP 250 OP 636 PS 637: Performed by: HOSPITALIST

## 2024-04-15 PROCEDURE — 76882 US LMTD JT/FCL EVL NVASC XTR: CPT | Mod: RT

## 2024-04-15 PROCEDURE — 36415 COLL VENOUS BLD VENIPUNCTURE: CPT | Performed by: INTERNAL MEDICINE

## 2024-04-15 PROCEDURE — T1013 SIGN LANG/ORAL INTERPRETER: HCPCS | Mod: GT,95 | Performed by: INTERPRETER

## 2024-04-15 PROCEDURE — 99254 IP/OBS CNSLTJ NEW/EST MOD 60: CPT

## 2024-04-15 PROCEDURE — 82962 GLUCOSE BLOOD TEST: CPT

## 2024-04-15 PROCEDURE — 82374 ASSAY BLOOD CARBON DIOXIDE: CPT | Performed by: HOSPITALIST

## 2024-04-15 PROCEDURE — 250N000013 HC RX MED GY IP 250 OP 250 PS 637: Performed by: HOSPITALIST

## 2024-04-15 PROCEDURE — 73218 MRI UPPER EXTREMITY W/O DYE: CPT | Mod: RT

## 2024-04-15 PROCEDURE — 250N000011 HC RX IP 250 OP 636

## 2024-04-15 PROCEDURE — 36415 COLL VENOUS BLD VENIPUNCTURE: CPT | Performed by: HOSPITALIST

## 2024-04-15 PROCEDURE — 85048 AUTOMATED LEUKOCYTE COUNT: CPT | Performed by: HOSPITALIST

## 2024-04-15 PROCEDURE — 87040 BLOOD CULTURE FOR BACTERIA: CPT | Performed by: INTERNAL MEDICINE

## 2024-04-15 PROCEDURE — 99232 SBSQ HOSP IP/OBS MODERATE 35: CPT | Performed by: INTERNAL MEDICINE

## 2024-04-15 PROCEDURE — 99222 1ST HOSP IP/OBS MODERATE 55: CPT | Performed by: INTERNAL MEDICINE

## 2024-04-15 PROCEDURE — T1013 SIGN LANG/ORAL INTERPRETER: HCPCS | Mod: GT,TEL,95 | Performed by: INTERPRETER

## 2024-04-15 RX ORDER — LIDOCAINE 40 MG/G
CREAM TOPICAL
Status: DISCONTINUED | OUTPATIENT
Start: 2024-04-15 | End: 2024-04-18 | Stop reason: HOSPADM

## 2024-04-15 RX ORDER — NICOTINE POLACRILEX 4 MG
15-30 LOZENGE BUCCAL
Status: DISCONTINUED | OUTPATIENT
Start: 2024-04-15 | End: 2024-04-18 | Stop reason: HOSPADM

## 2024-04-15 RX ORDER — ONDANSETRON 2 MG/ML
4 INJECTION INTRAMUSCULAR; INTRAVENOUS EVERY 6 HOURS PRN
Status: DISCONTINUED | OUTPATIENT
Start: 2024-04-15 | End: 2024-04-18 | Stop reason: HOSPADM

## 2024-04-15 RX ORDER — AMOXICILLIN 250 MG
1 CAPSULE ORAL 2 TIMES DAILY PRN
Status: DISCONTINUED | OUTPATIENT
Start: 2024-04-15 | End: 2024-04-18 | Stop reason: HOSPADM

## 2024-04-15 RX ORDER — ONDANSETRON 4 MG/1
4 TABLET, ORALLY DISINTEGRATING ORAL EVERY 6 HOURS PRN
Status: DISCONTINUED | OUTPATIENT
Start: 2024-04-15 | End: 2024-04-18 | Stop reason: HOSPADM

## 2024-04-15 RX ORDER — CEFAZOLIN SODIUM 1 G/3ML
1 INJECTION, POWDER, FOR SOLUTION INTRAMUSCULAR; INTRAVENOUS EVERY 8 HOURS SCHEDULED
Status: DISCONTINUED | OUTPATIENT
Start: 2024-04-15 | End: 2024-04-17

## 2024-04-15 RX ORDER — LISINOPRIL 20 MG/1
20 TABLET ORAL DAILY
Status: DISCONTINUED | OUTPATIENT
Start: 2024-04-15 | End: 2024-04-18 | Stop reason: HOSPADM

## 2024-04-15 RX ORDER — AMOXICILLIN 250 MG
2 CAPSULE ORAL 2 TIMES DAILY PRN
Status: DISCONTINUED | OUTPATIENT
Start: 2024-04-15 | End: 2024-04-18 | Stop reason: HOSPADM

## 2024-04-15 RX ORDER — CALCIUM CARBONATE 500 MG/1
1000 TABLET, CHEWABLE ORAL 4 TIMES DAILY PRN
Status: DISCONTINUED | OUTPATIENT
Start: 2024-04-15 | End: 2024-04-18 | Stop reason: HOSPADM

## 2024-04-15 RX ORDER — ACETAMINOPHEN 325 MG/1
650 TABLET ORAL EVERY 4 HOURS PRN
Status: DISCONTINUED | OUTPATIENT
Start: 2024-04-15 | End: 2024-04-18 | Stop reason: HOSPADM

## 2024-04-15 RX ORDER — ALLOPURINOL 100 MG/1
100 TABLET ORAL 2 TIMES DAILY
Status: DISCONTINUED | OUTPATIENT
Start: 2024-04-15 | End: 2024-04-18 | Stop reason: HOSPADM

## 2024-04-15 RX ORDER — GLIPIZIDE 10 MG/1
10 TABLET, FILM COATED, EXTENDED RELEASE ORAL DAILY
Status: DISCONTINUED | OUTPATIENT
Start: 2024-04-15 | End: 2024-04-18 | Stop reason: HOSPADM

## 2024-04-15 RX ORDER — ATORVASTATIN CALCIUM 10 MG/1
20 TABLET, FILM COATED ORAL DAILY
Status: DISCONTINUED | OUTPATIENT
Start: 2024-04-15 | End: 2024-04-18 | Stop reason: HOSPADM

## 2024-04-15 RX ORDER — ACETAMINOPHEN 650 MG/1
650 SUPPOSITORY RECTAL EVERY 4 HOURS PRN
Status: DISCONTINUED | OUTPATIENT
Start: 2024-04-15 | End: 2024-04-18 | Stop reason: HOSPADM

## 2024-04-15 RX ORDER — CEFTRIAXONE 1 G/1
1 INJECTION, POWDER, FOR SOLUTION INTRAMUSCULAR; INTRAVENOUS EVERY 24 HOURS
Status: DISCONTINUED | OUTPATIENT
Start: 2024-04-15 | End: 2024-04-15

## 2024-04-15 RX ORDER — DEXTROSE MONOHYDRATE 25 G/50ML
25-50 INJECTION, SOLUTION INTRAVENOUS
Status: DISCONTINUED | OUTPATIENT
Start: 2024-04-15 | End: 2024-04-18 | Stop reason: HOSPADM

## 2024-04-15 RX ADMIN — ACETAMINOPHEN 650 MG: 325 TABLET ORAL at 08:31

## 2024-04-15 RX ADMIN — GLIPIZIDE 10 MG: 10 TABLET, FILM COATED, EXTENDED RELEASE ORAL at 08:31

## 2024-04-15 RX ADMIN — LISINOPRIL 20 MG: 20 TABLET ORAL at 08:31

## 2024-04-15 RX ADMIN — CEFAZOLIN 1 G: 1 INJECTION, POWDER, FOR SOLUTION INTRAMUSCULAR; INTRAVENOUS at 21:40

## 2024-04-15 RX ADMIN — RIVAROXABAN 15 MG: 15 TABLET, FILM COATED ORAL at 18:32

## 2024-04-15 RX ADMIN — ALLOPURINOL 100 MG: 100 TABLET ORAL at 08:31

## 2024-04-15 RX ADMIN — INSULIN ASPART 1 UNITS: 100 INJECTION, SOLUTION INTRAVENOUS; SUBCUTANEOUS at 13:23

## 2024-04-15 RX ADMIN — RIVAROXABAN 15 MG: 15 TABLET, FILM COATED ORAL at 08:31

## 2024-04-15 RX ADMIN — CEFAZOLIN 1 G: 1 INJECTION, POWDER, FOR SOLUTION INTRAMUSCULAR; INTRAVENOUS at 14:23

## 2024-04-15 RX ADMIN — ATORVASTATIN CALCIUM 20 MG: 10 TABLET, FILM COATED ORAL at 08:31

## 2024-04-15 RX ADMIN — ALLOPURINOL 100 MG: 100 TABLET ORAL at 19:16

## 2024-04-15 RX ADMIN — INSULIN ASPART 1 UNITS: 100 INJECTION, SOLUTION INTRAVENOUS; SUBCUTANEOUS at 07:53

## 2024-04-15 RX ADMIN — ACETAMINOPHEN 650 MG: 325 TABLET ORAL at 00:36

## 2024-04-15 ASSESSMENT — ACTIVITIES OF DAILY LIVING (ADL)
ADLS_ACUITY_SCORE: 36
ADLS_ACUITY_SCORE: 42
ADLS_ACUITY_SCORE: 36
ADLS_ACUITY_SCORE: 29
ADLS_ACUITY_SCORE: 36
ADLS_ACUITY_SCORE: 29
ADLS_ACUITY_SCORE: 36
ADLS_ACUITY_SCORE: 42
ADLS_ACUITY_SCORE: 29
ADLS_ACUITY_SCORE: 42
ADLS_ACUITY_SCORE: 36
ADLS_ACUITY_SCORE: 29
ADLS_ACUITY_SCORE: 29
ADLS_ACUITY_SCORE: 42
ADLS_ACUITY_SCORE: 36
ADLS_ACUITY_SCORE: 42
ADLS_ACUITY_SCORE: 36
ADLS_ACUITY_SCORE: 29

## 2024-04-15 NOTE — PLAN OF CARE
Problem: Comorbidity Management  Goal: Blood Pressure in Desired Range  Outcome: Progressing     Problem: Pain Acute  Goal: Optimal Pain Control and Function  Outcome: Progressing     Problem: Skin or Soft Tissue Infection  Goal: Absence of Infection Signs and Symptoms  Outcome: Progressing   Goal Outcome Evaluation:  BP initially elevated but improved. Prn tylenol and ice pack for R hand pain. Elevated on 3 pillows. Swelling has decreased with ice. Iv antibiotics per orders. Up to the bathroom with stand by assist. Patient's daughter at bedside.   Patient getting MRI of R hand done at end of shift.

## 2024-04-15 NOTE — CONSULTS
Consultation - [facility]  José Luis Bocanegra,  1960, MRN [id number]    [facility]  Swelling of right hand  Chronic pulmonary embolism without acute cor pulmonale, unspecified pulmonary embolism type (H)  Noncompliance with medication regimen  Cellulitis  Lactic acidosis    PCP: Bonnie Crain, [phone]   Code status:  Full Code       Extended Emergency Contact Information  Primary Emergency Contact: Fabiola Hardy  Home Phone: [phone]  Relation: Son-in-Law  Secondary Emergency Contact: Mike Bocanegra   [place]  Mobile Phone: [phone]  Relation: Daughter-in-Law       Assessment and Plan   Active Problems:    Lactic acidosis    Cellulitis    Noncompliance with medication regimen    Swelling of right hand    Chronic pulmonary embolism without acute cor pulmonale, unspecified pulmonary embolism type (H)    Impression: Acute on chronic right hand pain, swelling, erythema.  MRI nonspecific for infection although some tenosynovitis is described.    Right lower extremity DVT and pulmonary emboli.    Unclear to me how much of the chronic swelling might be related to chronic pulmonary emboli.    Patient also has history of gout and uncontrolled diabetes mellitus.    Recommendations: Would continue empiric antimicrobial chemotherapy with cefazolin pending further culture reports.  If this is a nonpurulent cellulitis, cefazolin should be sufficient.  If this is impending septic arthritis, cefazolin may be helpful depending on the underlying organism.    Continue keeping right hand elevated.    Will require daily examination of the hand and quite probably require repeat MRI if not improving within a day or 2.    Discussed with Dr. Perry      Thank you for consulting [facility]    Ron Frederick MD  [phone]     Chief Complaint <principal problem not specified>       HPI   We have been requested by Emilie Perry,  MD to evaluate José Luis Bocanegra for right hand swelling who is a 64 year old year old female.    Patient is a 64-year-old woman with complicated past medical history as below.  She is seen in the presence of her daughter who provides additional historical insights.  Patient was also seen with assistance of professional telephone .    Briefly, patient states she presented to [facility] yesterday for increasing pain and swelling in the right hand.  She says the right hand is actually been swelling for almost 3 weeks.  Presented to [facility] emergency department back on March 29 complaining of shortness of breath and was diagnosed with right lower extremity DVT.  CT chest at that time was negative for pulmonary emboli.  Patient was prescribed some blood thinners but did not take them.    Patient says pain is being worse over the last few days.  She says the pain makes her sweaty.  She has not had any documented fevers.    Patient return to the ED yesterday for further evaluation because of continuing swelling and worsening hand pain.  She was seen by Dr. Toro, the hand surgeon, who was concerned about possible septic arthritis in the right third metacarpal phalangeal joint.  Patient did undergo MRI scan which shows minimal effusion and synovitis in that joint.  Not clear that it is amenable to percutaneous drainage.    Patient denies pain elsewhere.  She says she gets short of breath when walking.    CT of the chest now shows chronic weblike thrombus in the distal left main pulmonary artery.       Medical History  Patient Active Problem List   Diagnosis    Benign essential hypertension    Other insomnia    GERD (gastroesophageal reflux disease)    Morbid obesity (H)    Lactic acidosis    Cellulitis    Noncompliance with medication regimen    Swelling of right hand    Chronic pulmonary embolism without acute cor pulmonale, unspecified pulmonary embolism type (H)     Past Medical History:  "  Diagnosis Date    Benign essential hypertension 2/20/2020    GERD (gastroesophageal reflux disease) 2/20/2020    Other insomnia 2/20/2020    Surgical History  She  has no past surgical history on file.   Social History  Reviewed, and she  reports that she has never smoked. She has never used smokeless tobacco.   Allergies  No Known Allergies Family History  Noncontributory to current problem except as mentioned above    Psychosocial Needs  Social History     Social History Narrative    Not on file     Additional psychosocial needs reviewed per nursing assessment.     Prior to Admission Medications   (Not in a hospital admission)         Review of Systems:  Pertinent items are noted in HPI., otherwise all others negative. Physical Exam:  Temp:  [97  F (36.1  C)-99.1  F (37.3  C)] 98.4  F (36.9  C)  Pulse:  [] 85  Resp:  [20-24] 20  BP: (118-178)/(56-94) 169/94  SpO2:  [90 %-95 %] 93 %    BP (!) 169/94 (BP Location: Left arm)   Pulse 85   Temp 98.4  F (36.9  C) (Oral)   Resp 20   Ht 1.499 m (4' 11\")   Wt 86.2 kg (190 lb)   SpO2 93%   BMI 38.38 kg/m    General appearance: alert, appears stated age, and cooperative  Head: Normocephalic, without obvious abnormality, atraumatic  Eyes: Extraocular muscles intact, no icterus  Throat: lips, mucosa, and tongue normal; teeth and gums normal  Neck: no adenopathy and supple, symmetrical, trachea midline  Lungs: clear to auscultation bilaterally  Heart: Regular rate and rhythm, no murmur  Abdomen: soft, non-tender; bowel sounds normal; no masses,  no organomegaly  Extremities: She has significant edema in the right hand with especially increased erythema over the dorsum of the hand.  Prominent veins in the right forearm.  No fluctuance is appreciated.  Skin: Skin color, texture, turgor normal. No rashes or lesions  Lymph nodes: Cervical, supraclavicular, and axillary nodes normal.  Neurologic: Grossly normal       Pertinent Labs  Lab Results: personally reviewed. "   WBC Count   Date/Time Value Ref Range Status   04/15/2024 08:16 AM 10.7 4.0 - 11.0 10e3/uL Final   04/14/2024 07:36 PM 10.2 4.0 - 11.0 10e3/uL Final   03/29/2024 09:24 PM 10.7 4.0 - 11.0 10e3/uL Final     Hemoglobin   Date/Time Value Ref Range Status   04/15/2024 08:16 AM 11.8 11.7 - 15.7 g/dL Final   04/14/2024 07:36 PM 13.0 11.7 - 15.7 g/dL Final   03/29/2024 09:24 PM 12.4 11.7 - 15.7 g/dL Final     Hematocrit   Date/Time Value Ref Range Status   04/15/2024 08:16 AM 36.9 35.0 - 47.0 % Final   04/14/2024 07:36 PM 41.4 35.0 - 47.0 % Final   03/29/2024 09:24 PM 39.6 35.0 - 47.0 % Final     Platelet Count   Date/Time Value Ref Range Status   04/15/2024 08:16  150 - 450 10e3/uL Final   04/14/2024 07:36  150 - 450 10e3/uL Final   03/29/2024 09:24  150 - 450 10e3/uL Final        Sodium   Date/Time Value Ref Range Status   04/15/2024 08:16  135 - 145 mmol/L Final     Comment:     Reference intervals for this test were updated on 09/26/2023 to more accurately reflect our healthy population. There may be differences in the flagging of prior results with similar values performed with this method. Interpretation of those prior results can be made in the context of the updated reference intervals.    04/14/2024 07:36  135 - 145 mmol/L Final     Comment:     Reference intervals for this test were updated on 09/26/2023 to more accurately reflect our healthy population. There may be differences in the flagging of prior results with similar values performed with this method. Interpretation of those prior results can be made in the context of the updated reference intervals.    03/29/2024 07:37  135 - 145 mmol/L Final     Comment:     Reference intervals for this test were updated on 09/26/2023 to more accurately reflect our healthy population. There may be differences in the flagging of prior results with similar values performed with this method. Interpretation of those prior results can be  made in the context of the updated reference intervals.      Carbon Dioxide (CO2)   Date/Time Value Ref Range Status   04/15/2024 08:16 AM 22 22 - 29 mmol/L Final   04/14/2024 07:36 PM 23 22 - 29 mmol/L Final   03/29/2024 07:37 PM 24 22 - 29 mmol/L Final   04/01/2022 07:48 PM 23 22 - 31 mmol/L Final     Urea Nitrogen   Date/Time Value Ref Range Status   04/15/2024 08:16 AM 12.5 8.0 - 23.0 mg/dL Final   04/14/2024 07:36 PM 14.6 8.0 - 23.0 mg/dL Final   03/29/2024 07:37 PM 18.0 8.0 - 23.0 mg/dL Final   04/01/2022 07:48 PM 25 (H) 8 - 22 mg/dL Final     Creatinine   Date Value Ref Range Status   04/15/2024 1.29 (H) 0.51 - 0.95 mg/dL Final     7-Day Micro Results       Collected Updated Procedure Result Status      04/14/2024 2303 04/15/2024 1232 Blood Culture Peripheral Blood [[id number]]   Peripheral Blood    Preliminary result Component Value   Culture No growth after 12 hours  [P]                04/14/2024 2259 04/15/2024 1232 Blood Culture Peripheral Blood [[id number]]   Peripheral Blood    Preliminary result Component Value   Culture No growth after 12 hours  [P]                           Pertinent Radiology  Radiology Results:   US Upper Extremity Non Vascular Right    Result Date: 4/15/2024  EXAM: US UPPER EXTREMITY NON VASCULAR RIGHT LOCATION: [facility] DATE: 4/15/2024 INDICATION: Hand swelling. Minimal fluid near the third digit MCP right hand on MRI. COMPARISON: MRI 04/15/2024. TECHNIQUE: Routine. FINDINGS: Subcutaneous right hand edema present. Not enough of joint fluid was present along the third digit MCP for aspiration. No aspiration performed.     MR Hand Right w/o Contrast    Result Date: 4/15/2024  EXAM: MR HAND RIGHT W/O CONTRAST LOCATION: [facility] DATE: 4/15/2024 INDICATION: Hand swelling and redness. Concern for abscess. COMPARISON: X-rays 04/14/2024 TECHNIQUE: Unenhanced. FINDINGS: TENDONS: -Extensor tendons: Tendinopathy and likely high-grade  tearing extensor carpi ulnaris tendon, not definitively evaluated due to motion. There are likely thin intact tendon fibers. Edema adjacent to the tendon. No abnormal tendon sheath fluid. -Flexor tendons: No tendon tear or tendinopathy. Edema and minimal fluid along the flexor digitorum tendons of the ring finger compatible with tenosynovitis. Edema in the adjacent lumbrical muscles. LIGAMENTS: -No evidence of collateral ligament tear. JOINTS AND BONES: -There is likely mild degenerative arthritis involving multiple joints in the wrist wrist, at the edge of the field-of-view, and minimal radiocarpal joint effusion or synovitis. Minimal fluid or synovitis of the third MCP joint with mild capsular edema. Minimal effusion or synovitis of the PIP joints of the ring and middle fingers. MUSCLES AND SOFT TISSUES: -Poorly defined subcutaneous edema dorsally, extending into the ring finger. No fluid collection.     IMPRESSION: 1.  Subcutaneous edema dorsally could represent cellulitis. No abscess. 2.  Minimal effusion or synovitis of the third MCP joint and mild capsular edema is nonspecific and could be reactive or inflammatory. Early developing septic arthritis is a possibility. Consider short interval follow-up with contrast enhanced MRI. 3.  Mild tenosynovitis of the flexor digitorum tendons of the ring finger with edema - inflammation in the adjacent lumbrical muscles. 4.  Extensor carpi ulnaris tendinopathy and likely high-grade tearing distally..    XR Hand Right G/E 3 Views    Result Date: 4/14/2024  EXAM: XR HAND RIGHT G/E 3 VIEWS LOCATION: [facility] DATE: 4/14/2024 INDICATION: right hand swelling COMPARISON: None.     IMPRESSION: There is diffuse soft tissue swelling of the dorsum of the hand and wrist, no radiopaque foreign bodies are identified. No acute fracture or malalignment of the bony structures are noted. There is mild degenerative changes of the radiocarpal and ulnocarpal joints  with subtle erosive changes of the ulnar styloid process present. Degenerative changes of the PIP and DIP joints of the hand are also noted.    CT Chest Pulmonary Embolism w Contrast    Result Date: 4/14/2024  EXAM: CT CHEST PULMONARY EMBOLISM W CONTRAST LOCATION: [facility] DATE: 4/14/2024 INDICATION: Known right-sided DVT; noncompliance on anticoagulation. COMPARISON: CT chest 03/29/2024. TECHNIQUE: CT chest pulmonary angiogram during arterial phase injection of IV contrast. Multiplanar reformats and MIP reconstructions were performed. Dose reduction techniques were used. CONTRAST: 75 mL Isovue-370. FINDINGS: Patient was imaged with arm(s) at side, limiting study quality. ANGIOGRAM CHEST: No acute pulmonary embolism. Unchanged thin linear filling defect at the bifurcation of the distal left main pulmonary artery, compatible with chronic thrombus, series 4 image 151. Main pulmonary artery is mildly enlarged, measuring up to 3.7 cm, similar to prior. Ascending thoracic aortic aneurysm, measuring 4.2 cm, unchanged. LUNGS AND PLEURA: Trachea and large airways are patent. No acute airspace consolidation. Unchanged linear scarring and volume loss within the right middle and right upper lobes.  No suspicious pulmonary nodule. No pneumothorax. Mild chronic right pleural thickening. No acute pleural effusion.  MEDIASTINUM/AXILLAE: Unchanged mild mediastinal lymphadenopathy, as compared to 04/01/2022. For reference, a right pretracheal lymph node measures 10 mm in short axis, series 4 image 96. No mediastinal lymphadenopathy. Thoracic esophagus is unremarkable.  Mild right axillary lymphadenopathy, unchanged from 04/01/2022. For reference, a right axillary lymph node measures up to 12 mm in short axis, series 4 image 50. Chest wall is unremarkable. Heart is normal in size. No pericardial effusion. CORONARY ARTERY CALCIFICATION: None. UPPER ABDOMEN: Subtle liver surface nodularity, suggesting  underlying cirrhosis. Cholelithiasis. Persistent fetal lobulation of both kidneys. MUSCULOSKELETAL: No suspicious abnormality. OTHER: No additionally suspicious abnormality.     IMPRESSION: 1.  No acute pulmonary embolism. 2.  Chronic weblike thrombus within the distal left main pulmonary artery. 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 Pulmonology follow-up is recommended. 3.  Mild mediastinal and right axillary lymphadenopathy is unchanged from 2022 and likely reactive/benign. No further follow-up is recommended. 4.  Unchanged 4.2 cm ascending thoracic aortic aneurysm. 5.  Hepatic cirrhosis. 6.  Cholelithiasis. [Recommend Follow Up: Pulmonology follow-up.] This report will be copied to the [facility] to ensure a provider acknowledges the finding.     US Upper Extremity Venous Duplex Right    Result Date: 4/14/2024  EXAM: US UPPER EXTREMITY VENOUS DUPLEX RIGHT LOCATION: [facility] DATE: 4/14/2024 INDICATION: Right hand swelling, dx of DVT, not taking anticoagulation COMPARISON: None. TECHNIQUE: Venous Duplex ultrasound of the right upper extremity with (when possible) and without compression, augmentation, and duplex. Color flow and spectral Doppler with waveform analysis performed. FINDINGS: Ultrasound includes evaluation of the internal jugular vein, innominate vein, subclavian vein, axillary vein, and brachial vein. The superficial cephalic and basilic veins were also evaluated where seen. RIGHT: No deep venous thrombosis. No superficial thrombophlebitis.     IMPRESSION: 1.  No deep venous thrombosis in the right upper extremity.    US Lower Extremity Venous Duplex Bilateral    Result Date: 3/29/2024  EXAM: US LOWER EXTREMITY VENOUS DUPLEX BILATERAL LOCATION: [facility] DATE: 3/29/2024 INDICATION: leg  swelling, pain COMPARISON: None. TECHNIQUE: Venous Duplex ultrasound of bilateral lower extremities with and without compression, augmentation and duplex. Color flow and spectral Doppler with waveform analysis performed. FINDINGS: Exam includes the common femoral, femoral, popliteal veins as well as segmentally visualized deep calf veins and greater saphenous vein. RIGHT: There is an occlusive deep venous thrombosis below the knee in the right gastrocnemius veins measuring approximately 3.5 cm in length. No other DVT. No superficial thrombophlebitis. No popliteal cyst. LEFT: No deep vein thrombosis. No superficial thrombophlebitis. No popliteal cyst.     IMPRESSION: 1.  3.5 cm occlusive deep venous thrombosis below the knee in the right gastrocnemius veins. 2.  No other DVT.     CT Chest Pulmonary Embolism w Contrast    Result Date: 3/29/2024  EXAM: CT CHEST PULMONARY EMBOLISM W CONTRAST LOCATION: [facility] DATE: 3/29/2024 INDICATION: dyspnea, elevated dimer COMPARISON: CTA chest 04/01/2022 TECHNIQUE: CT chest pulmonary angiogram during arterial phase injection of IV contrast. Multiplanar reformats and MIP reconstructions were performed. Dose reduction techniques were used. CONTRAST: ISOVUE 370 70ML FINDINGS: ANGIOGRAM CHEST: Pulmonary arteries are normal caliber and negative for pulmonary emboli. Thoracic aorta is negative for dissection. No CT evidence of right heart strain. Ascending thoracic aorta upper range of normal measuring 3.9 x 3.9 cm. LUNGS AND PLEURA: Chronic right pleural reaction and right lung volume loss, unchanged. The left lung is clear. No acute infiltrates. MEDIASTINUM/AXILLAE: Cardiomegaly. No adenopathy. CORONARY ARTERY CALCIFICATION: None. UPPER ABDOMEN: Gallstones. MUSCULOSKELETAL: Hypertrophic changes thoracic spine.     IMPRESSION: 1.  No evidence for pulmonary emboli. 2.  Chronic right pleural reaction and right lung volume loss, unchanged. 3.  No new pulmonary  disease.

## 2024-04-15 NOTE — MEDICATION SCRIBE - ADMISSION MEDICATION HISTORY
Medication Scribe Admission Medication History    Admission medication history is complete. The information provided in this note is only as accurate as the sources available at the time of the update.    Information Source(s): Patient via in-person    Pertinent Information:     Changes made to PTA medication list:  Added: Diclofenac(per patient and fill history)  Deleted: Almacone suspension, Fluoxetine, Ibuprofen, Lansoprazole, Losartan, Metformin, Omeprazole, Ondansetron, Ranitidine, Systane, Tradjenta, Trazodone (per patient)  Changed: None    Allergies reviewed with patient and updates made in EHR: yes    Medication History Completed By: Ephraim Colon 4/14/2024 11:35 PM    PTA Med List   Medication Sig Last Dose    acetaminophen (TYLENOL) 325 MG tablet Take 325-650 mg by mouth every 6 hours as needed for mild pain Unknown at prn    allopurinol (ZYLOPRIM) 100 MG tablet Take 100 mg by mouth 2 times daily 4/13/2024 at pm    atorvastatin (LIPITOR) 20 MG tablet Take 20 mg by mouth daily 4/13/2024 at am    diclofenac (VOLTAREN) 1 % topical gel Apply 2 g topically 3 times daily as needed for moderate pain APPLY 2 GRAMS TO HAND AND FEET THREE TIMES A DAY AS NEEDED FOR PAIN.//PLEEV 2 GRAMS 3 ZAUG TXHUA HNUB YOG MOB Unknown at prn    glipiZIDE (GLUCOTROL XL) 10 MG 24 hr tablet Take 10 mg by mouth daily 4/13/2024 at am    lisinopril (ZESTRIL) 20 MG tablet Take 20 mg by mouth daily TAKE 1 TABLET DAILY BY MOUTH FOR HIGH BLOOD PRESSURE // IB HNUB NOJ 1 LUB PAB DILSHAD NTSHAV SIAB 4/13/2024 at am    Rivaroxaban ANTICOAGULANT 15 & 20 MG TBPK Starter Therapy Pack Take 15 mg by mouth 2 times daily (with meals) for 21 days, THEN 20 mg daily with food for 9 days. Unknown at not started yet

## 2024-04-15 NOTE — PROGRESS NOTES
[facility]    Medicine Progress Note - Hospitalist Service    Date of Admission:  4/14/2024    Assessment & Plan      José Luis Bocanegra is a 64 year old female admitted on 4/14/2024. She presented with right hand swelling. She was also found to have an elevated lactic acid. She was diagnosed with cellulitis and started on rocephin    Right hand cellulitis  Lactic acidosis  Early developing septic arthritis  Tenosynovitis  High-grade  MRI showed subcutaneous edema dorsally suggestive of cellulitis, early developing septic arthritis is a possibility, mild tenosynovitis of the flexor digitorum tendons of the ring finger with edema - inflammation in the adjacent lumbrical muscles and extensor carpi ulnaris tendinopathy and likely high-grade tearing distally.    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) for which pulmonology follow-up is recommended, mild mediastinal and right axillary lymphadenopathy is unchanged from 2022 and likely reactive/benign, unchanged 4.2 cm ascending thoracic aortic aneurysm, hepatic cirrhosis and no evidence of acute pulmonary embolism    -Now with signs of pulmonary hypertension on CT  -patient not taking anticoagulation at home for unclear reasons  -Continue xarelto as planned as outpatient  -Follow-up pulmonology consult    CKD stage 3b  -renal functions at baseline  -monitor    Chronic gout  -continue allopurinol    Hyperlipidemia  -continue lipitor    Type 2 diabetes mellitus not on long term insulin  -continue glipizide  -monitor  "glucose ac and hs  -sliding scale insulin  -hypoglycemic protocol  Essential hypertension  -continue lisinopril    Hepatic cirrhosis  -seen on ct  -monitor    4.2 cm ascending thoracic aortic aneurysm  -stable  -outpatient follow up      Diet: Combination Diet Regular Diet Adult; Moderate Consistent Carb (60 g CHO per Meal) Diet  NPO per Anesthesia Guidelines for Procedure/Surgery Except for: Meds    DVT Prophylaxis: Pneumatic Compression Devices  Hilliard Catheter: Not present  Lines: None     Cardiac Monitoring: None  Code Status: Full Code      Clinically Significant Risk Factors Present on Admission               # Drug Induced Coagulation Defect: home medication list includes an anticoagulant medication    # Hypertension: Noted on problem list     # DMII: A1C = 9.2 % (Ref range: <5.7 %) within past 6 months    # Obesity: Estimated body mass index is 38.38 kg/m  as calculated from the following:    Height as of this encounter: 1.499 m (4' 11\").    Weight as of this encounter: 86.2 kg (190 lb).              Disposition Plan     Medically Ready for Discharge: Anticipated in 2-4 Days    Emilie Perry MD  Hospitalist Service  [facility]  Securely message with [employer] (more info)  Text page via [employer] Paging/Directory   ______________________________________________________________________    Interval History   No new complaints and no acute events overnight.  She endorsed painful right hand swelling.  About to go for MRI.  Physical Exam   Vital Signs: Temp: 98.4  F (36.9  C) Temp src: Oral BP: (!) 169/94 Pulse: 85   Resp: 20 SpO2: 93 % O2 Device: None (Room air)    Weight: 190 lbs 0 oz    General appearance: Awake, Alert, Cooperative, not in any obvious distress and appears stated age   HEENT: Normocephalic, atraumatic, conjunctiva clear without icterus and ears without discharge  Lungs: Clear to auscultation bilaterally, no wheezing, good air exchange, normal work of " breathing  Cardiovascular: Regular Rate and Rythm, normal apical impulse, normal S1 and S2, no lower extremity edema bilaterally  Abdomen: Soft, non-tender and Non-distended, active bowel sounds  Skin: Skin color, texture normal and bruising or bleeding. No rashes or lesions over face, neck, arms and legs, turgor normal.  Musculoskeletal: Swollen-worse in the dorsal aspect and tender  Neurologic: Alert & Oriented X 3, Facial symmetry preserved and upper & lower extremities moving well with symmetry  Psychiatric: Calm, normal eye contact and normal affect      Medical Decision Making       40 MINUTES SPENT BY ME on the date of service doing chart review, history, exam, documentation & further activities per the note.      Data     I have personally reviewed the following data over the past 24 hrs:    10.7  \   11.8   / 241     139 107 12.5 /  140 (H)   4.0 22 1.29 (H) \     TSH: N/A T4: N/A A1C: 9.2 (H)     Procal: N/A CRP: 50.60 (H) Lactic Acid: 3.3 (H)         Imaging results reviewed over the past 24 hrs:   Recent Results (from the past 24 hour(s))   US Upper Extremity Venous Duplex Right    Narrative    EXAM: US UPPER EXTREMITY VENOUS DUPLEX RIGHT  LOCATION: [facility]  DATE: 4/14/2024    INDICATION: Right hand swelling, dx of DVT, not taking anticoagulation  COMPARISON: None.  TECHNIQUE: Venous Duplex ultrasound of the right upper extremity with (when possible) and without compression, augmentation, and duplex. Color flow and spectral Doppler with waveform analysis performed.    FINDINGS: Ultrasound includes evaluation of the internal jugular vein, innominate vein, subclavian vein, axillary vein, and brachial vein. The superficial cephalic and basilic veins were also evaluated where seen.     RIGHT: No deep venous thrombosis. No superficial thrombophlebitis.      Impression    IMPRESSION:  1.  No deep venous thrombosis in the right upper extremity.   CT Chest Pulmonary Embolism w  Contrast   Result Value    Radiologist flags Pulmonology follow-up.    Narrative    EXAM: CT CHEST PULMONARY EMBOLISM W CONTRAST  LOCATION: [facility]  DATE: 4/14/2024    INDICATION: Known right-sided DVT; noncompliance on anticoagulation.  COMPARISON: CT chest 03/29/2024.  TECHNIQUE: CT chest pulmonary angiogram during arterial phase injection of IV contrast. Multiplanar reformats and MIP reconstructions were performed. Dose reduction techniques were used.   CONTRAST: 75 mL Isovue-[id number].    FINDINGS: Patient was imaged with arm(s) at side, limiting study quality.    ANGIOGRAM CHEST: No acute pulmonary embolism. Unchanged thin linear filling defect at the bifurcation of the distal left main pulmonary artery, compatible with chronic thrombus, series 4 image 151.    Main pulmonary artery is mildly enlarged, measuring up to 3.7 cm, similar to prior.    Ascending thoracic aortic aneurysm, measuring 4.2 cm, unchanged.    LUNGS AND PLEURA: Trachea and large airways are patent. No acute airspace consolidation. Unchanged linear scarring and volume loss within the right middle and right upper lobes.     No suspicious pulmonary nodule.    No pneumothorax.     Mild chronic right pleural thickening. No acute pleural effusion.     MEDIASTINUM/AXILLAE: Unchanged mild mediastinal lymphadenopathy, as compared to 04/01/2022. For reference, a right pretracheal lymph node measures 10 mm in short axis, series 4 image 96. No mediastinal lymphadenopathy.     Thoracic esophagus is unremarkable.      Mild right axillary lymphadenopathy, unchanged from 04/01/2022. For reference, a right axillary lymph node measures up to 12 mm in short axis, series 4 image 50.    Chest wall is unremarkable.    Heart is normal in size. No pericardial effusion.    CORONARY ARTERY CALCIFICATION: None.    UPPER ABDOMEN: Subtle liver surface nodularity, suggesting underlying cirrhosis. Cholelithiasis. Persistent fetal lobulation of both  kidneys.    MUSCULOSKELETAL: No suspicious abnormality.    OTHER: No additionally suspicious abnormality.      Impression    IMPRESSION:  1.  No acute pulmonary embolism.  2.  Chronic weblike thrombus within the distal left main pulmonary artery. 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 Pulmonology follow-up is recommended.  3.  Mild mediastinal and right axillary lymphadenopathy is unchanged from 2022 and likely reactive/benign. No further follow-up is recommended.  4.  Unchanged 4.2 cm ascending thoracic aortic aneurysm.  5.  Hepatic cirrhosis.  6.  Cholelithiasis.      [Recommend Follow Up: Pulmonology follow-up.]    This report will be copied to the [facility] to ensure a provider acknowledges the finding.      XR Hand Right G/E 3 Views    Narrative    EXAM: XR HAND RIGHT G/E 3 VIEWS  LOCATION: [facility]  DATE: 4/14/2024    INDICATION: right hand swelling  COMPARISON: None.      Impression    IMPRESSION: There is diffuse soft tissue swelling of the dorsum of the hand and wrist, no radiopaque foreign bodies are identified. No acute fracture or malalignment of the bony structures are noted. There is mild degenerative changes of the radiocarpal   and ulnocarpal joints with subtle erosive changes of the ulnar styloid process present. Degenerative changes of the PIP and DIP joints of the hand are also noted.   MR Hand Right w/o Contrast    Narrative    EXAM: MR HAND RIGHT W/O CONTRAST  LOCATION: [facility]  DATE: 4/15/2024    INDICATION: Hand swelling and redness. Concern for abscess.  COMPARISON: X-rays 04/14/2024  TECHNIQUE: Unenhanced.    FINDINGS:     TENDONS:   -Extensor tendons: Tendinopathy and likely high-grade tearing extensor carpi ulnaris tendon, not definitively evaluated due to motion. There are  likely thin intact tendon fibers. Edema adjacent to the tendon. No abnormal tendon sheath fluid.  -Flexor tendons: No tendon tear or tendinopathy. Edema and minimal fluid along the flexor digitorum tendons of the ring finger compatible with tenosynovitis. Edema in the adjacent lumbrical muscles.    LIGAMENTS:  -No evidence of collateral ligament tear.    JOINTS AND BONES:   -There is likely mild degenerative arthritis involving multiple joints in the wrist wrist, at the edge of the field-of-view, and minimal radiocarpal joint effusion or synovitis. Minimal fluid or synovitis of the third MCP joint with mild capsular edema.   Minimal effusion or synovitis of the PIP joints of the ring and middle fingers.    MUSCLES AND SOFT TISSUES:   -Poorly defined subcutaneous edema dorsally, extending into the ring finger. No fluid collection.      Impression    IMPRESSION:  1.  Subcutaneous edema dorsally could represent cellulitis. No abscess.  2.  Minimal effusion or synovitis of the third MCP joint and mild capsular edema is nonspecific and could be reactive or inflammatory. Early developing septic arthritis is a possibility. Consider short interval follow-up with contrast enhanced MRI.  3.  Mild tenosynovitis of the flexor digitorum tendons of the ring finger with edema - inflammation in the adjacent lumbrical muscles.  4.  Extensor carpi ulnaris tendinopathy and likely high-grade tearing distally..   US Upper Extremity Non Vascular Right    Narrative    EXAM: US UPPER EXTREMITY NON VASCULAR RIGHT  LOCATION: [facility]  DATE: 4/15/2024    INDICATION: Hand swelling. Minimal fluid near the third digit MCP right hand on MRI.  COMPARISON: MRI 04/15/2024.  TECHNIQUE: Routine.    FINDINGS: Subcutaneous right hand edema present. Not enough of joint fluid was present along the third digit MCP for aspiration. No aspiration performed.

## 2024-04-15 NOTE — CONSULTS
[facility] Orthopedic Consultation    José Luis Bocanegra MRN# [id number]   Age: 64 year old YOB: 1960     Date of Admission:  4/14/2024    Reason for consult: Right hand pain, erythema, and swelling       Requesting physician: Orlando Phan MD               Assessment and Plan:   Assessment:  64-year-old with a history of diabetes, lower extremity deep vein thrombosis, and gout presents with 3 days of right hand pain, swelling and erythema.  Physical exam notable for significant swelling on the dorsum of the hand.  No pain with short arc range of motion of the metacarpophalangeal joints.  Patient is afebrile and hemodynamically stable.  White blood cell count is within normal limits.  Uric acid is 7.3.  Mild elevated lactate of 3.3 with associated elevated creatinine of 1.55.  CRP is elevated at 50.6.  X-rays demonstrate no evidence of fracture, dislocation, or osteomyelitis.  MRI was obtained this morning after interview in the emergency department and demonstrates some effusion with synovitis in the middle finger metacarpophalangeal joint.  Her presentation is most consistent with right hand cellulitis.  She does have some pain over the dorsum of the hand and some synovitis noted with an MRI in the middle finger metacarpophalangeal joint.  Given her history of gout and this presentation consistent with infection I do think it would be wise to obtain an IR guided aspiration of the middle finger metacarpophalangeal joint to be sent for crystal analysis and culture.  This would better clarify the diagnosis.  No evidence of flexor tenosynovitis on exam.  I discussed my assessment and recommendations with the patient and her daughter.  We discussed the potential benefits and risks of the recommendations.  They were in agreement with the proposed plan.      Plan:  -Empiric treatment for cellulitis per hospitalist service  -Strict elevation of the hand.  -Serial hand examinations.  -Referral to  interventional radiology for guided aspiration of the middle finger metacarpophalangeal joint to be sent for crystal analysis and culture.  -Hand surgery will continue to follow.           Chief Complaint:   Right hand pain, erythema, and swelling.      History obtained from patient and her daughter.         History of Present Illness:   64-year-old with a history of right lower extremity deep vein thrombosis, diabetes, and gout presents with right hand pain, erythema, and swelling worsening over the last 2 to 3 days.    Patient presented to the emergency department where there was concern for right hand cellulitis.  Orthopedic hand surgery was consulted.    Patient is Hmong speaking.  History obtained with the help of the daughter who is at bedside.  They report that over the last 2 to 3 days she has noticed increasing erythema, pain, and swelling over the dorsum of the right hand.  The pain is worse over the ring finger metacarpal.  He denies worsening pain with range of motion of the metacarpophalangeal joints.  She does have a history of gout but reports this feels different than this.  She denies pain over the volar hand and digits.  She denies numbness and tingling.  She denies trauma.  She denies systemic subjective fever.  She does report malaise and feeling ill.  She has never had anything like this before.  Patient is right-handed.    Of note she was recently found to have a lower extremity DVT.  She is supposed be taking rivaroxaban for this.  She has not been taking this medicine.  She had a CT scan of the chest in the emergency department demonstrating a small ascending thoracic aortic aneurysm and enlargement of the left main pulmonary artery; no evidence of pulmonary embolism.              Past Medical History:     Past Medical History:   Diagnosis Date    Benign essential hypertension 2/20/2020    GERD (gastroesophageal reflux disease) 2/20/2020    Other insomnia 2/20/2020   Diabetes  Gout             Past Surgical History:   No history of surgery.          Social History:   Lives in [place].  Denies tobacco use.  Is accompanied by her daughter.  Speaks Hmong.          Family History:   Non-contributory.           Allergies:   This patient is allergic to has No Known Allergies.          Medications:   acetaminophen (TYLENOL) 325 MG tablet  allopurinol (ZYLOPRIM) 100 MG tablet  ALMACONE -400-40 MG/5ML SUSP suspension  atorvastatin (LIPITOR) 20 MG tablet  FLUoxetine (PROZAC) 40 MG capsule  glipiZIDE (GLUCOTROL XL) 10 MG 24 hr tablet  ibuprofen (IBU) 400 MG tablet  LANsoprazole (PREVACID) 30 MG DR capsule  lisinopril (PRINIVIL,ZESTRIL) 10 MG tablet  losartan (COZAAR) 50 MG tablet  metFORMIN (GLUCOPHAGE-XR) 500 MG 24 hr tablet  omeprazole (PRILOSEC) 20 MG capsule  ondansetron (ZOFRAN ODT) 4 MG ODT tab  ranitidine (ZANTAC) 300 MG tablet  SYSTANE ULTRA 0.4-0.3 % SOLN ophthalmic solution  TRADJENTA 5 MG TABS tablet  traZODone (DESYREL) 50 MG tablet          Review of Systems:   The Review of Systems is negative other than noted in the HPI          Physical Exam:   Patient Vitals for the past 12 hrs:   BP Temp Temp src Pulse Resp SpO2   04/15/24 0745 (!) 169/94 98.4  F (36.9  C) Oral 85 20 93 %   04/15/24 0402 118/56 99.1  F (37.3  C) Oral 92 20 90 %   04/15/24 0200 (!) 172/78 -- -- -- -- --   04/15/24 0100 (!) 166/79 -- -- -- -- --   04/15/24 0036 (!) 178/78 -- -- 106 -- 93 %   04/15/24 0024 (!) 171/77 98.9  F (37.2  C) Oral 111 24 92 %   General: No acute distress  Pulmonary: Respirations are unlabored on room air  Cardiac: No cyanosis  Right upper extremity: Dorsum of hand is very swollen and tender.  There is erythema over the dorsum of the hand.  Mildly tender over the ring finger metacarpophalangeal joint.  No tenderness over the metacarpophalangeal joint of the thumb, index finger, middle finger, or small finger.  No pain with passive short range of motion of the metacarpophalangeal joint of the thumb,  index finger, middle finger, ring finger, or small finger.  Actively extending the thumb, index finger, middle finger, ring finger, and small finger into full extension.  Actively flexing the index finger, middle finger, ring finger, and small finger down about 6 cm from the distal palmar crease; range of motion limited by dorsal hand swelling and pain in this area.  Patient opposing the thumb about 4 cm from the base of the small finger; range of motion limited by pain and swelling of the dorsum of the hand.  Sensation to light touch is intact on the radial and ulnar side of each of the digits.  Sensation to light touch is intact along the dorsum and volar surface of the hand.  Capillary fill each of the digits is 2 seconds.  No tenderness over the carpal tunnel.  No fusiform swelling of any of the digits.  No pain with passive extension of the thumb, index finger, middle finger, ring finger, or small finger.            Data:     Lab Results   Component Value Date    WBC 10.7 04/15/2024    WBC 10.2 04/14/2024    WBC 10.7 03/29/2024    HGB 11.8 04/15/2024    HGB 13.0 04/14/2024    HGB 12.4 03/29/2024    HCT 36.9 04/15/2024    HCT 41.4 04/14/2024    HCT 39.6 03/29/2024     04/15/2024     04/14/2024     03/29/2024     04/15/2024     04/14/2024     03/29/2024    POTASSIUM 4.0 04/15/2024    POTASSIUM 4.1 04/14/2024    POTASSIUM 4.2 03/29/2024    CHLORIDE 107 04/15/2024    CHLORIDE 103 04/14/2024    CHLORIDE 102 03/29/2024    CO2 22 04/15/2024    CO2 23 04/14/2024    CO2 24 03/29/2024    BUN 12.5 04/15/2024    BUN 14.6 04/14/2024    BUN 18.0 03/29/2024    CR 1.29 (H) 04/15/2024    CR 1.55 (H) 04/14/2024    CR 1.27 (H) 03/29/2024     (H) 04/15/2024     (H) 04/15/2024     (H) 04/15/2024    SED 63 (H) 04/14/2024    DD 3.47 (H) 03/29/2024    NTBNPI 74 01/10/2023    NTBNP 121 03/29/2024    AST 18 01/10/2023    AST 38 04/01/2022    ALT 15 01/10/2023    ALT 32  04/01/2022    ALKPHOS 118 (H) 01/10/2023    ALKPHOS 127 (H) 04/01/2022    BILITOTAL 0.5 01/10/2023    BILITOTAL 1.1 (H) 04/01/2022   Hemoglobin A1c is 9.2  Lactic acid is 3.3  Uric acid is 7.3  CRP is 50.6      All imaging studies reviewed by me.     X-ray of the right hand was reviewed by me.  X-ray of the right hand report is below:  Narrative & Impression   EXAM: XR HAND RIGHT G/E 3 VIEWS  LOCATION: [facility]  DATE: 4/14/2024     INDICATION: right hand swelling  COMPARISON: None.                                                                      IMPRESSION: There is diffuse soft tissue swelling of the dorsum of the hand and wrist, no radiopaque foreign bodies are identified. No acute fracture or malalignment of the bony structures are noted. There is mild degenerative changes of the radiocarpal   and ulnocarpal joints with subtle erosive changes of the ulnar styloid process present. Degenerative changes of the PIP and DIP joints of the hand are also noted.       MRI of the right hand was reviewed by me.  MRI of the right hand report is below:  Narrative & Impression   EXAM: MR HAND RIGHT W/O CONTRAST  LOCATION: [facility]  DATE: 4/15/2024     INDICATION: Hand swelling and redness. Concern for abscess.  COMPARISON: X-rays 04/14/2024  TECHNIQUE: Unenhanced.     FINDINGS:      TENDONS:   -Extensor tendons: Tendinopathy and likely high-grade tearing extensor carpi ulnaris tendon, not definitively evaluated due to motion. There are likely thin intact tendon fibers. Edema adjacent to the tendon. No abnormal tendon sheath fluid.  -Flexor tendons: No tendon tear or tendinopathy. Edema and minimal fluid along the flexor digitorum tendons of the ring finger compatible with tenosynovitis. Edema in the adjacent lumbrical muscles.     LIGAMENTS:  -No evidence of collateral ligament tear.     JOINTS AND BONES:   -There is likely mild degenerative arthritis involving multiple  joints in the wrist wrist, at the edge of the field-of-view, and minimal radiocarpal joint effusion or synovitis. Minimal fluid or synovitis of the third MCP joint with mild capsular edema.   Minimal effusion or synovitis of the PIP joints of the ring and middle fingers.     MUSCLES AND SOFT TISSUES:   -Poorly defined subcutaneous edema dorsally, extending into the ring finger. No fluid collection.                                                                      IMPRESSION:  1.  Subcutaneous edema dorsally could represent cellulitis. No abscess.  2.  Minimal effusion or synovitis of the third MCP joint and mild capsular edema is nonspecific and could be reactive or inflammatory. Early developing septic arthritis is a possibility. Consider short interval follow-up with contrast enhanced MRI.  3.  Mild tenosynovitis of the flexor digitorum tendons of the ring finger with edema - inflammation in the adjacent lumbrical muscles.  4.  Extensor carpi ulnaris tendinopathy and likely high-grade tearing distally..         Attestation:  I saw the patient, wrote this note, and reviewed all the data.  Lanre Harris MD

## 2024-04-15 NOTE — ED NOTES
Pt seen here about 2 weeks ago for DVT in leg.  Put on Xaralto, pt has not taken any of it.  Discussed with Son who is present and explained risks of not taking it.  Son will encourage pt to take it.

## 2024-04-15 NOTE — ED PROVIDER NOTES
Emergency Department Midlevel Supervisory Note     I had a face to face encounter with this patient seen by the Advanced Practice Provider (BURT). I personally made/approved the management plan and take responsibility for the patient management. I personally saw patient and performed a substantive portion of the visit including all aspects of the medical decision making.     ED Course:  10:24 PM Joe Raman PA-C staffed patient with me. I agree with their assessment and plan of management, and I will see the patient.  10:35 PM I met with the patient to introduce myself, gather additional history, perform my initial exam, and discuss the plan.     Brief HPI:     José Luis Bocanegra is a 64 year old female who presents for evaluation of  right-sided hand swelling.     Patient reports history of gout in the past.  Has been taking allopurinol, however this not been helping with her symptoms.  Notes increased swelling in the right hand over the last couple of days.  Patient's hand is red, swollen, warm to the touch, no open wounds noted to the hand.  Patient denies any recent injury, bites to the area.  Tachycardic here, however afebrile.  Of note, patient does report that she has not started taking her anticoagulation despite being discharged on 03/29/2024 with instructions to do so.  She has been taking over-the-counter medications and herbal supplements for right-sided hand swelling, suspected gout.  She denies any fevers, shortness of breath or chest pain.  Otherwise denies any other recent injury.     IMichael, am serving as a scribe to document services personally performed by Teodora Contreras MD, based on my observations and the provider's statements to me. Diane FREEMAN Erin E, MD, attest that Michael Flores is acting in a scribe capacity, has observed my performance of the services and has documented them in accordance with my direction.     Brief Physical Exam: BP (!) 159/89   Pulse 106   Temp 97  F (36.1  " C)   Resp 22   Ht 1.499 m (4' 11\")   Wt 86.2 kg (190 lb)   SpO2 95%   BMI 38.38 kg/m    Constitutional:  Alert, in no acute distress  EYES: Conjunctivae clear  HENT:  Atraumatic  Respiratory:  Respirations even, unlabored, in no acute respiratory distress  Cardiovascular:  Regular rate and rhythm, good peripheral perfusion  GI: Soft, non-distended, non-tender  Musculoskeletal:  Moves all 4 extremities equally, grossly symmetrical strength. Right dorsal hand diffusely swollen and tender with redness and warmth, no skin breaks.  Integument: Warm & dry. No appreciable rash, erythema.  Neurologic:  Alert & oriented, speech clear and fluent, no focal deficits noted  Psych: Normal mood and affect       MDM:  Patient with right hand swelling and pain, with lactic acid uptrending and rapid HR, normal CBC but elevated ESR and CRP, pending right hand XR and abx, admission for possible cellulitis and plan to d/w ortho hand, patient and family ok with plan. IVF begun with uptrending lactic acid along with antibiotic therapy in case cellulitis of right dominant hand is rapidly developing and to ensure safety for her if infectious pathology present with developing sepsis.      1. Gout of right hand    2. Swelling of right hand    3. Chronic pulmonary embolism without acute cor pulmonale, unspecified pulmonary embolism type (H)    4. Noncompliance with medication regimen          Labs and Imaging:  Results for orders placed or performed during the hospital encounter of 04/14/24   US Upper Extremity Venous Duplex Right    Impression    IMPRESSION:  1.  No deep venous thrombosis in the right upper extremity.   CT Chest Pulmonary Embolism w Contrast   Result Value Ref Range    Radiologist flags Pulmonology follow-up.     Impression    IMPRESSION:  1.  No acute pulmonary embolism.  2.  Chronic weblike thrombus within the distal left main pulmonary artery. 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 Pulmonology follow-up is recommended.  3.  Mild mediastinal and right axillary lymphadenopathy is unchanged from 2022 and likely reactive/benign. No further follow-up is recommended.  4.  Unchanged 4.2 cm ascending thoracic aortic aneurysm.  5.  Hepatic cirrhosis.  6.  Cholelithiasis.      [Recommend Follow Up: Pulmonology follow-up.]    This report will be copied to the [facility] to ensure a provider acknowledges the finding.      Lactic acid whole blood   Result Value Ref Range    Lactic Acid 2.2 (H) 0.7 - 2.0 mmol/L   Erythrocyte sedimentation rate auto   Result Value Ref Range    Erythrocyte Sedimentation Rate 63 (H) 0 - 30 mm/hr   Result Value Ref Range    CRP Inflammation 50.60 (H) <5.00 mg/L   CBC with platelets and differential   Result Value Ref Range    WBC Count 10.2 4.0 - 11.0 10e3/uL    RBC Count 4.68 3.80 - 5.20 10e6/uL    Hemoglobin 13.0 11.7 - 15.7 g/dL    Hematocrit 41.4 35.0 - 47.0 %    MCV 89 78 - 100 fL    MCH 27.8 26.5 - 33.0 pg    MCHC 31.4 (L) 31.5 - 36.5 g/dL    RDW 13.4 10.0 - 15.0 %    Platelet Count 245 150 - 450 10e3/uL    % Neutrophils 65 %    % Lymphocytes 25 %    % Monocytes 8 %    % Eosinophils 2 %    % Basophils 0 %    % Immature Granulocytes 0 %    NRBCs per 100 WBC 0 <1 /100    Absolute Neutrophils 6.6 1.6 - 8.3 10e3/uL    Absolute Lymphocytes 2.6 0.8 - 5.3 10e3/uL    Absolute Monocytes 0.9 0.0 - 1.3 10e3/uL    Absolute Eosinophils 0.2 0.0 - 0.7 10e3/uL    Absolute Basophils 0.0 0.0 - 0.2 10e3/uL    Absolute Immature Granulocytes 0.0 <=0.4 10e3/uL    Absolute NRBCs 0.0 10e3/uL   Extra Blue Top Tube   Result Value Ref Range    Hold Specimen JIC    Extra Red Top Tube   Result Value Ref Range    Hold Specimen JIC    Extra Green Top (Lithium Heparin) Tube   Result Value Ref Range    Hold Specimen JIC    Extra Purple Top Tube   Result Value Ref Range     Hold Specimen [facility]    Lactic acid whole blood   Result Value Ref Range    Lactic Acid 3.3 (H) 0.7 - 2.0 mmol/L   Basic metabolic panel   Result Value Ref Range    Sodium 140 135 - 145 mmol/L    Potassium 4.1 3.4 - 5.3 mmol/L    Chloride 103 98 - 107 mmol/L    Carbon Dioxide (CO2) 23 22 - 29 mmol/L    Anion Gap 14 7 - 15 mmol/L    Urea Nitrogen 14.6 8.0 - 23.0 mg/dL    Creatinine 1.55 (H) 0.51 - 0.95 mg/dL    GFR Estimate 37 (L) >60 mL/min/1.73m2    Calcium 8.9 8.8 - 10.2 mg/dL    Glucose 262 (H) 70 - 99 mg/dL       I have reviewed the relevant laboratory studies above.      Teodora Contreras MD  [facility] EMERGENCY DEPARTMENT  [address] [place] [address] [id number] [phone]     Teodora Contreras MD  04/14/24 0056

## 2024-04-15 NOTE — PLAN OF CARE
Goal Outcome Evaluation:    Problem: Skin or Soft Tissue Infection  Goal: Absence of Infection Signs and Symptoms  Outcome: Progressing  Intervention: Minimize and Manage Infection Progression  Recent Flowsheet Documentation  Taken 4/15/2024 0745 by John Nathan, RN  Infection Prevention:   hand hygiene promoted   rest/sleep promoted  Intervention: Provide Meticulous Infection Site Care  Recent Flowsheet Documentation  Taken 4/15/2024 0745 by John Nathan, RN  Topical Inflammation Care: (ice pack requested for right hand) other (see comments)    Patient A&Ox4, makes needs known. Family at bedside facilitating in cares. Family ok with daughter at bedside to interpret. Up independently to void; continent of bladder and bowel. Low appetite; BG checked for meals, sliding scale insulin administered per order. Bilateral hands edematous, reddened, and painful to touch. Had R hand joint aspiration. IV abx given. Report given to ALISON Roberts, to go to P110. Patient agreeable to transfer. Patient left ED around 1600. Medications and chart sent to floor.

## 2024-04-15 NOTE — H&P
[facility]    History and Physical - Hospitalist Service       Date of Admission:  4/14/2024    Assessment & Plan      José Luis Bocanegra is a 64 year old female admitted on 4/14/2024. She presented with right hand swelling. She was also found to have an elevated lactic acid. She was diagnosed with cellulitis and started on rocephin    Right hand cellulitis  Lactic acidosis  Tachycardia  -with worsening right hand swelling and pain  -tachycardic on arrival  -labs otherwise showed an elevated lactic acid and normal wbc count  -inflammatory markers elevated  -cultures taken in the ED  -will admit to the hospital  -continue antibiotics with rocephin  -get MRI of right hand as there's concern for abscess  -follow up cultures  -trend lactic acid    Chronic PE  Pulmonary hypertension  -known PE  -now with signs of pulmonary hypertension on CT  -patient not taking anticoagulation at home for unclear reasons  -will start xarelto as was planned outpatient    CKD stage 3b  -renal functions at baseline  -monitor    Chronic gout  -continue allopurinol    Hyperlipidemia  -continue lipitor    Type 2 diabetes mellitus not on long term insulin  -continue glipizide  -monitor glucose ac and hs  -sliding scale insulin  -hypoglycemic protocol  Essential hypertension  -continue lisinopril    Hepatic cirrhosis  -seen on ct  -monitor    4.2 cm ascending thoracic aortic aneurysm  -stable  -outpatient follow up    Dispo: admit inpatient        Diet:  carb controlled  DVT Prophylaxis: Heparin SQ  Hilliard Catheter: Not present  Lines: None     Cardiac Monitoring: None  Code Status:  full code    Clinically Significant Risk Factors Present on Admission               # Drug Induced Coagulation Defect: home medication list includes an anticoagulant medication    # Hypertension: Noted on problem list      # Obesity: Estimated body mass index is 38.38 kg/m  as calculated from the following:    Height as of this encounter: 1.499 m (4'  "11\").    Weight as of this encounter: 86.2 kg (190 lb).              Disposition Plan     Medically Ready for Discharge: Anticipated in 2-4 Days           Pernell Rueda MD  Hospitalist Service  [facility]  Securely message with [employer] (more info)  Text page via [employer] Paging/Directory     ______________________________________________________________________    Chief Complaint   Hand pain    History is obtained from the patient with help from     History of Present Illness   José Luis Bocanegra is a 64 year old female known PE, ckd, and gout presented with right hand pain. Symptoms started about 1 week prior to arrival. Pain got progressively worse. With associated redness and swelling. Patient unable to make a fist due to pain. Also with associated shortness of breath. Of note, she's not taking her anticoagulation for her PE. In the ED, the patient was hemodynamically stable. Labs showed elevated lactic acid. She was treated with iv fluids and iv antibiotics. When seen by this service the patient reported her hand pain was somewhat better with pain treatment but she was still unable to make a fist. Otherwise she also remained short of breath. Daughter at bedside reported no additional concerns. Remaining ROS negative. No fevers, no chills, no chest pain, no cough, no abdominal pain, no nausea, no vomiting, and no other areas of swelling.      Past Medical History    Past Medical History:   Diagnosis Date    Benign essential hypertension 2/20/2020    GERD (gastroesophageal reflux disease) 2/20/2020    Other insomnia 2/20/2020       Past Surgical History   No past surgical history on file.    Prior to Admission Medications   Prior to Admission Medications   Prescriptions Last Dose Informant Patient Reported? Taking?   Rivaroxaban ANTICOAGULANT 15 & 20 MG TBPK Starter Therapy Pack   No No   Sig: Take 15 mg by mouth 2 times daily (with meals) for 21 days, THEN 20 mg daily with food for 9 " days.   Rivaroxaban ANTICOAGULANT 15 & 20 MG TBPK Starter Therapy Pack Unknown at not started yet Self No Yes   Sig: Take 15 mg by mouth 2 times daily (with meals) for 21 days, THEN 20 mg daily with food for 9 days.   acetaminophen (TYLENOL) 325 MG tablet Unknown at prn Self Yes Yes   Sig: Take 325-650 mg by mouth every 6 hours as needed for mild pain   allopurinol (ZYLOPRIM) 100 MG tablet 4/13/2024 at pm Self Yes Yes   Sig: Take 100 mg by mouth 2 times daily   atorvastatin (LIPITOR) 20 MG tablet 4/13/2024 at am Self Yes Yes   Sig: Take 20 mg by mouth daily   diclofenac (VOLTAREN) 1 % topical gel Unknown at prn Self Yes Yes   Sig: Apply 2 g topically 3 times daily as needed for moderate pain APPLY 2 GRAMS TO HAND AND FEET THREE TIMES A DAY AS NEEDED FOR PAIN.//PLEEV 2 GRAMS 3 ZAUG TXHUA HNUB YOG MOB   glipiZIDE (GLUCOTROL XL) 10 MG 24 hr tablet 4/13/2024 at am Self Yes Yes   Sig: Take 10 mg by mouth daily   lisinopril (ZESTRIL) 20 MG tablet 4/13/2024 at am Self Yes Yes   Sig: Take 20 mg by mouth daily TAKE 1 TABLET DAILY BY MOUTH FOR HIGH BLOOD PRESSURE // IB HNUB NOJ 1 LUB PAB DILSHAD NTSHAV SIAB      Facility-Administered Medications: None           Physical Exam   Vital Signs: Temp: 97  F (36.1  C)   BP: (!) 159/89 Pulse: 106   Resp: 22 SpO2: 95 % O2 Device: None (Room air)    Weight: 190 lbs 0 oz    Gen:  no acute distress; lying in bed  HEENT:  Anicteric sclera, hearing intact to voice  Resp:  breathing normally on room air. No rales, wheezing, or stridor  Card:  normal rate, normal rhythm. No murmurs appreciated  Abd:  Soft, non-tender, non-distended, normoactive bowel sounds are present  Musc:  right hand edematous and erythematous and with reduced active and passive range of motion. Otherwise normal strength and movement of the major muscle groups without obvious deformity  Psych:  Alert; oriented to person, place and time, not anxious, not agitated  Extr:  no pedal edema      Medical Decision Making              Data

## 2024-04-15 NOTE — CONSULTS
PULMONARY MEDICINE CONSULT  4/15/2024      Admit Date: 4/14/2024  CODE: Full Code    Reason for Consult: CTED, chronic dyspnea on exertion.       Assessment/Plan:   64F, Hmong-speaking, with history of obesity, recent dx of DVT, CKD, gout, presented with right hand pain. Admitted and treated for right hand cellulitis. CT PE scan showed chronic appearing emboli. Main PA does appear enlarged, measuring approximately 3.9cm. she does not have a prior echocardiogram or noninvasive pulmonary HTN study. she has not been compliant with the DOAC. I spent some time discussing the importance of taking the DOAC with the patient and her daughter. She should have an evaluation for CTEPH but this can be done in the outpatient setting. I will place a referral to the CTEPH center at the [facility]. She may need a VQ scan and right heart catheterization down the road. This evaluation is almost always done in the outpatient setting. No further inpatient workup needed from the pulmonary perspective, in the absence of abnormal vital signs.   No further recommendations; we'll sign off.   Please call with additional questions.    Zacarias (Bib) MD Hari   [facility] [email or web address]  Pulmonary & Critical Care  Pager [phone]  Clinic [phone]  Fax [phone]                                                                                                                                                         HPI:   CCx: CTED, chronic dyspnea on exertion.     HPI: 64F, Hmong-speaking, with history of obesity, recent dx of DVT, CKD, gout, presented with right hand pain. Admitted and treated for right hand cellulitis. ID consulted. CT PE run showed chronic weblike thrombus in the left main PA suggesting possible chronic thromboembolic disease. Unable to get [employer]  on the phone, so he daughter functioned as  with the patient's permission. She has not been taking her DOAC since her diagnosis of DVT. Unclear why.  "She has chronic CASTAÑEDA which has been going on for a year. No chest pain, cough, hemoptysis. She has chronic LE swelling as well, L > R (DVT was on the right). 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                                                                                                                                                         Past Medical History:  Past Medical History:   Diagnosis Date    Benign essential hypertension 2/20/2020    GERD (gastroesophageal reflux disease) 2/20/2020    Other insomnia 2/20/2020        Past Surgical History  No past surgical history on file.    Allergies:  No Known Allergies    PTA medications:  (Not in a hospital admission)      Family Hx:  No family history on file.    Social Hx:  Social History     Socioeconomic History    Marital status:      Spouse name: Not on file    Number of children: Not on file    Years of education: Not on file    Highest education level: Not on file   Occupational History    Not on file   Tobacco Use    Smoking status: Never    Smokeless tobacco: Never   Substance and Sexual Activity    Alcohol use: Not on file    Drug use: Not on file    Sexual activity: Not on file   Other Topics Concern    Not on file   Social History Narrative    Not on file     Social Determinants of Health     Financial Resource Strain: Not on file   Food Insecurity: Not on file   Transportation Needs: Not on file   Physical Activity: Not on file   Stress: Not on file   Social Connections: Not on file   Interpersonal Safety: Not on file   Housing Stability: Not on file       Exam/Data:     Vitals  BP (!) 169/94 (BP Location: Left arm)   Pulse 85   Temp 98.4  F (36.9  C) (Oral)   Resp 20   Ht 1.499 m (4' 11\")   Wt 86.2 kg (190 lb)   SpO2 93%   BMI 38.38 kg/m       I/O last 3 completed shifts:  In: 1700 [P.O.:600; IV Piggyback:1100]  Out: -   Weight change:   [unfilled]  EXAM:  BP (!) 169/94 (BP " "Location: Left arm)   Pulse 85   Temp 98.4  F (36.9  C) (Oral)   Resp 20   Ht 1.499 m (4' 11\")   Wt 86.2 kg (190 lb)   SpO2 93%   BMI 38.38 kg/m      Intake/Output last 3 shifts:  I/O last 3 completed shifts:  In: 1700 [P.O.:600; IV Piggyback:1100]  Out: -   Intake/Output this shift:  No intake/output data recorded.    Physical Exam  Gen: awake, alert, oriented, no distress  HEENT: NT, no CARLIN  CV: RRR, no m/g/r  Resp: clear ant, no w/r/r  Abd: soft, nontender, BS+  Skin: no rashes or lesions  Ext: trace peripheral edema. Right hand appears swollen. Compress in place.   Neuro: PERRL, nonfocal exam    ROS: A complete 10-system review of systems was obtained and is negative with the exception of what is noted in the history of present illness.    Medications:     Current Facility-Administered Medications   Medication Dose Route Frequency Provider Last Rate Last Admin    Patient is already receiving anticoagulation with heparin, enoxaparin (LOVENOX), warfarin (COUMADIN)  or other anticoagulant medication   Does not apply Continuous PRN Pernell Rueda MD         Current Facility-Administered Medications   Medication Dose Route Frequency Provider Last Rate Last Admin    allopurinol (ZYLOPRIM) tablet 100 mg  100 mg Oral BID Pernell Rueda MD   100 mg at 04/15/24 0831    atorvastatin (LIPITOR) tablet 20 mg  20 mg Oral Daily Pernell Rueda MD   20 mg at 04/15/24 0831    ceFAZolin (ANCEF) 1 g vial to attach to  ml bag for ADULT or 50 ml bag for PEDS  1 g Intravenous Q8H Ron Montelongo MD   1 g at 04/15/24 1423    glipiZIDE (GLUCOTROL XL) 24 hr tablet 10 mg  10 mg Oral Daily Pernell Rueda MD   10 mg at 04/15/24 0831    insulin aspart (NovoLOG) injection (RAPID ACTING)  1-3 Units Subcutaneous TID AC Pernell Rueda MD   1 Units at 04/15/24 1323    insulin aspart (NovoLOG) injection (RAPID ACTING)  1-3 Units Subcutaneous At Bedtime Pernell Rueda MD        lisinopril " (ZESTRIL) tablet 20 mg  20 mg Oral Daily Pernell Rueda MD   20 mg at 04/15/24 0831    rivaroxaban ANTICOAGULANT (XARELTO) tablet 15 mg  15 mg Oral BID w/meals Pernell Rueda MD   15 mg at 04/15/24 0831    Followed by    [START ON 5/6/2024] rivaroxaban ANTICOAGULANT (XARELTO) tablet 20 mg  20 mg Oral Daily with supper Pernell Rueda MD        sodium chloride (PF) 0.9% PF flush 3 mL  3 mL Intracatheter Q8H Pernell Rueda MD   3 mL at 04/15/24 0755         DATA  All laboratory and radiology has been personally reviewed by myself today.  Recent Labs   Lab 04/15/24  0816   WBC 10.7   HGB 11.8   HCT 36.9        Recent Labs   Lab 04/15/24  0816 04/14/24  1936    140   CO2 22 23   BUN 12.5 14.6         PFT DATA   None available      IMAGING:  Personally reviewed

## 2024-04-15 NOTE — ED PROVIDER NOTES
EMERGENCY DEPARTMENT ENCOUNTER      NAME: José Luis Bocanegra  AGE: 64 year old female  YOB: 1960  MRN: [id number]  EVALUATION DATE & TIME: 4/14/2024  6:55 PM    PCP: Bonnie Crain    ED PROVIDER: Jona Raman PA-C      Chief Complaint   Patient presents with    Hand Pain    Swelling       FINAL IMPRESSION:  1. Swelling of right hand    2. Chronic pulmonary embolism without acute cor pulmonale, unspecified pulmonary embolism type (H)    3. Noncompliance with medication regimen    4. Cellulitis    5. Lactic acidosis      ED COURSE & MEDICAL DECISION MAKING:    Pertinent Labs & Imaging studies reviewed. (See chart for details)  7:08 PM I met the patient and performed my initial interview and exam.   9:49 PM Patient  updated  10:09 PM Patient seen and updated, plan for discharge.   10:24 PM Staffed with Dr. Contreras.   10:46 PM Patient discussed with on call [facility] Dr. Delgado, no further recommendations.   10:52 PM Patient seen and reevaluated, updated on repeat labs, imaging, plan for admission.    11:50 PM [facility] for [facility]      64 year old female presents to the Emergency Department for evaluation of hand pain, hand swelling.     ED Course as of 04/14/24 2339   Sun Apr 14, 2024   1926 Patient is a 64-year-old female Recently seen here in the emergency department and diagnosed with right lower extremity DVT, who presents emergency department for evaluation of right-sided hand swelling.  Patient reportedly has been taking her prescribed medications for gout, however has not started any medications of blood clot.  Reports that over the last couple of days, increased only to the right hand.  Patient's hand is red, swollen, warm to the touch.  No recent wounds, animal bites, or other noted injury to the right hand.  On review of her chart, it does appear that she was prescribed allopurinol, however unclear exactly if she has been taking this  "chronically, she does report that she stopped taking a prescription for short period of time, and then did resume it.  She has not been taking her blood thinners.  She reports that she has been \"too sick\" to take her blood thinners.  She has not taken any more than the dose that she was given here in the emergency department.    On examination here in the emergency department, she does have a significant amount of swelling, redness to the right hand, concerning for possible infectious etiology versus lymphedema, versus blood clot.  Will obtain ultrasound here in the emergency department given her history of clot, and lack of anticoagulation.  Additionally will obtain inflammatory markers here in the emergency department.  Broad differential considered here for right upper arm swelling including infectious etiology, gout, less likely to be blood clot.  Given that the patient has not taking any of her blood thinners, will reCT study here here in the emergency department given that she is tachycardic, and has previous known right-sided DVT that she has not been taking the appropriate medications or treatment for.  Otherwise well-appearing here in the emergency department, denies any chest pain or shortness of breath.  Plan for basic labs, CT imaging, and reassessment.   2007 Notably elevated here in the emergency department at 2.2, CRP 50, sed rate 63, white count not significantly elevated here.   2025 No evidence of DVT in the right upper extremity.   2054     Uric acid elevated here at 2.2, CRP elevated at 50, and sed rate 63, no significant leukocytosis, I do not think her presentation here is consistent with systemic infection, though concerning for possible cellulitis of the right hand though this would be an abnormal presentation, will give liter of fluids here in the emergency department, and reassess.  Patient additionally will undergo CT PE study here given she has not started anticoagulation, with known " history of right-sided lower extremity DVT.   2137  I reviewed the CT PE study here, I do not appreciate any acute abnormalities.  Pending final radiology read.   2159 CT PE study here shows no pulmonary emboli, chronic weblike thrombus within the distal left main pulmonary artery.  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  Mild mediastinal and right axillary lymphadenopathy is unchanged from 2022 and likely benign.  Unchanged 4.2 cm ascending thoracic aortic aneurysm.   2213 Lactic acid here in the emergency department is increased to 3.3.  This is after a liter of normal saline.   2323 I have independently reviewed the x-ray of the hand, I do not appreciate any acute fractures or deformities.  Pending final radiology read.   2338     Patient was seen and reevaluated, updated on plan for admission here, suspicious that lactate has increased here in the emergency department, concerning for possible infectious etiology and cellulitis in the right hand, will start on ceftriaxone here in the emergency department, and patient will be admitted for ongoing observation and management.  Additionally, she needs to restart her anticoagulation here, no noted acute abnormalities noted on CT PE study, however chronic thrombus noted, likely secondary to not being on anticoagulation.  Patient without any other acute abnormalities here, will be admitted for ongoing IV antibiotics and reassessment.       Critical care 35 minutes excluding separately billable procedures.  Includes bedside management, time reviewing test results, review of records, discussing thecase with staff, documenting the medical record and time spent with family members (or surrogate decision makers) discussing specific treatment issues.      Medical Decision Making  Obtained supplemental history:Supplemental history  obtained?: No  Reviewed external records: External records reviewed?: Outpatient Record: Outpatient radiology and office visit on 03/29/2024, outpatient ED visit on 03/29/2024  Care impacted by chronic illness:Hypertension and Other: obesity, anticoagulated.   Care significantly affected by social determinants of health:Access to Medical Care and Access to Affordable Health Care  Did you consider but not order tests?: Work up considered but not performed and documented in chart, if applicable  Did you interpret images independently?: Independent interpretation of ECG and images noted in documentation, when applicable.  Consultation discussion with other provider:Did you involve another provider (consultant, , pharmacy, etc.)?: No  Admit.    The patient has signs of Severe Sepsis        If one the following conditions is present, a 30 mL/kg bolus is recommended as part of the 6 hour bundle (IBW can be used for BMI >30, or document refusal/contraindication):      1.   Initial hypotension  defined as 2 bps < 90 or map < 65 in the 6hrs before or 3hrs after time zero.     2.  Lactate >4.      The patient has signs of Severe Sepsis as evidenced by:    1. 2 SIRS criteria, AND  2. Suspected infection, AND   3. Organ dysfunction: Lactic Acidosis with value >2.0    Time severe sepsis diagnosis confirmed: 10:47 PM  04/14/24 as this was the time when Lactate resulted, and the level was > 2.0    3 Hour Severe Sepsis Bundle Completion:    1. Initial Lactic Acid Result:   Recent Labs   Lab Test 04/14/24 2209 04/14/24  1936   LACT 3.3* 2.2*     2. Blood Cultures before Antibiotics: Yes  3. Broad Spectrum Antibiotics Administered:  yes       Anti-infectives (From admission through now)      Start     Dose/Rate Route Frequency Ordered Stop    04/14/24 2300  cefTRIAXone (ROCEPHIN) 1 g vial to attach to  mL bag for ADULTS or NS 50 mL bag for PEDS         1 g  over 15-30 Minutes Intravenous ONCE 04/14/24 2232 04/14/24 2316             4. Is initial hypotension present?     No (IV fluid bolus NOT required). IV Fluid volume administered: 1000mls        0 minutes of critical care time     MEDICATIONS GIVEN IN THE EMERGENCY:  Medications   sodium chloride 0.9% BOLUS 1,000 mL (0 mLs Intravenous Stopped 4/14/24 2207)   iopamidol (ISOVUE-370) solution 75 mL (75 mLs Intravenous $Given 4/14/24 2134)   colchicine (COLCRYS) tablet 1.2 mg (1.2 mg Oral $Given 4/14/24 2148)   cefTRIAXone (ROCEPHIN) 1 g vial to attach to  mL bag for ADULTS or NS 50 mL bag for PEDS (1 g Intravenous $New Bag 4/14/24 2301)       NEW PRESCRIPTIONS STARTED AT TODAY'S ER VISIT  Current Discharge Medication List             =================================================================    HPI    Patient information was obtained from: Patient     Use of : N/A       José Luis Bocanegra is a 64 year old female with a pertinent history of hypertension, GERD, morbid obesity, recent diagnosis of right-sided lower extremity DVT, not taking anticoagulation who presents to this ED for evaluation of right-sided hand swelling.  Patient reports history of gout in the past.  Has been taking allopurinol, however this not been helping with her symptoms.  Notes increased swelling in the right hand over the last couple of days.  Patient's hand is red, swollen, warm to the touch, no open wounds noted to the hand.  Patient denies any recent injury, bites to the area.  Tachycardic here, however afebrile.  Of note, patient does report that she has not started taking her anticoagulation despite being discharged on 03/29/2024 with instructions to do so.  She has been taking over-the-counter medications and herbal supplements for right-sided hand swelling, suspected gout.  She denies any fevers, shortness of breath or chest pain.  Otherwise denies any other recent injury.    PAST MEDICAL HISTORY:  Past Medical History:   Diagnosis Date    Benign essential hypertension 2/20/2020    GERD  "(gastroesophageal reflux disease) 2/20/2020    Other insomnia 2/20/2020       PAST SURGICAL HISTORY:  No past surgical history on file.        CURRENT MEDICATIONS:    acetaminophen (TYLENOL) 325 MG tablet  allopurinol (ZYLOPRIM) 100 MG tablet  atorvastatin (LIPITOR) 20 MG tablet  diclofenac (VOLTAREN) 1 % topical gel  glipiZIDE (GLUCOTROL XL) 10 MG 24 hr tablet  lisinopril (ZESTRIL) 20 MG tablet  Rivaroxaban ANTICOAGULANT 15 & 20 MG TBPK Starter Therapy Pack         ALLERGIES:  No Known Allergies    FAMILY HISTORY:  No family history on file.    SOCIAL HISTORY:   Social History     Socioeconomic History    Marital status:    Tobacco Use    Smoking status: Never    Smokeless tobacco: Never       VITALS:  BP (!) 159/89   Pulse 106   Temp 97  F (36.1  C)   Resp 22   Ht 1.499 m (4' 11\")   Wt 86.2 kg (190 lb)   SpO2 95%   BMI 38.38 kg/m      PHYSICAL EXAM     Physical Exam  Vitals and nursing note reviewed.   Constitutional:       General: She is not in acute distress.     Appearance: Normal appearance. She is normal weight. She is not toxic-appearing or diaphoretic.   HENT:      Right Ear: External ear normal.      Left Ear: External ear normal.   Eyes:      Conjunctiva/sclera: Conjunctivae normal.   Cardiovascular:      Rate and Rhythm: Regular rhythm. Tachycardia present.      Pulses: Normal pulses.   Pulmonary:      Effort: Pulmonary effort is normal.   Abdominal:      General: Abdomen is flat.      Palpations: Abdomen is soft.      Tenderness: There is no abdominal tenderness. There is no right CVA tenderness, left CVA tenderness, guarding or rebound.   Musculoskeletal:         General: Swelling and tenderness present. Normal range of motion.      Right lower leg: No edema.      Left lower leg: No edema.      Comments: Tenderness, erythema, swelling to the right hand, over the joints of the hand.  No surrounding erythema or redness, no streaking up the arm.   Skin:     Findings: Erythema present. No " rash.      Comments: Erythema noted to the right hand, as well as associated swelling.  No significant fluctuance noted.   Neurological:      Mental Status: She is alert. Mental status is at baseline.           LAB:  All pertinent labs reviewed and interpreted.  Labs Ordered and Resulted from Time of ED Arrival to Time of ED Departure   LACTIC ACID WHOLE BLOOD - Abnormal       Result Value    Lactic Acid 2.2 (*)    ERYTHROCYTE SEDIMENTATION RATE AUTO - Abnormal    Erythrocyte Sedimentation Rate 63 (*)    CRP INFLAMMATION - Abnormal    CRP Inflammation 50.60 (*)    CBC WITH PLATELETS AND DIFFERENTIAL - Abnormal    WBC Count 10.2      RBC Count 4.68      Hemoglobin 13.0      Hematocrit 41.4      MCV 89      MCH 27.8      MCHC 31.4 (*)     RDW 13.4      Platelet Count 245      % Neutrophils 65      % Lymphocytes 25      % Monocytes 8      % Eosinophils 2      % Basophils 0      % Immature Granulocytes 0      NRBCs per 100 WBC 0      Absolute Neutrophils 6.6      Absolute Lymphocytes 2.6      Absolute Monocytes 0.9      Absolute Eosinophils 0.2      Absolute Basophils 0.0      Absolute Immature Granulocytes 0.0      Absolute NRBCs 0.0     LACTIC ACID WHOLE BLOOD - Abnormal    Lactic Acid 3.3 (*)    BASIC METABOLIC PANEL - Abnormal    Sodium 140      Potassium 4.1      Chloride 103      Carbon Dioxide (CO2) 23      Anion Gap 14      Urea Nitrogen 14.6      Creatinine 1.55 (*)     GFR Estimate 37 (*)     Calcium 8.9      Glucose 262 (*)    BLOOD CULTURE   BLOOD CULTURE       RADIOLOGY:  Reviewed all pertinent imaging. Please see official radiology report.  XR Hand Right G/E 3 Views   Final Result   IMPRESSION: There is diffuse soft tissue swelling of the dorsum of the hand and wrist, no radiopaque foreign bodies are identified. No acute fracture or malalignment of the bony structures are noted. There is mild degenerative changes of the radiocarpal    and ulnocarpal joints with subtle erosive changes of the ulnar styloid  process present. Degenerative changes of the PIP and DIP joints of the hand are also noted.      CT Chest Pulmonary Embolism w Contrast   Final Result   IMPRESSION:   1.  No acute pulmonary embolism.   2.  Chronic weblike thrombus within the distal left main pulmonary artery. 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 Pulmonology follow-up is recommended.   3.  Mild mediastinal and right axillary lymphadenopathy is unchanged from 2022 and likely reactive/benign. No further follow-up is recommended.   4.  Unchanged 4.2 cm ascending thoracic aortic aneurysm.   5.  Hepatic cirrhosis.   6.  Cholelithiasis.         [Recommend Follow Up: Pulmonology follow-up.]      This report will be copied to the [facility] to ensure a provider acknowledges the finding.          US Upper Extremity Venous Duplex Right   Final Result   IMPRESSION:   1.  No deep venous thrombosis in the right upper extremity.          PROCEDURES:   None.     Jona Raman PA-C  Emergency Medicine  [facility] EMERGENCY DEPARTMENT  [facility] [facility] [id number] [phone]  Dept: [phone]       Jona Raman PA-C  04/14/24 9167       Jona Raman PA-C  04/14/24 8310

## 2024-04-15 NOTE — UTILIZATION REVIEW
Admission Status; Secondary Review Determination       Under the authority of the Utilization Management Committee, the utilization review process indicated a secondary review on the above patient. The review outcome is based on review of the medical records, discussions with staff, and applying clinical experience noted on the date of the review.     (x) Inpatient Status Appropriate - This patient's medical care is consistent with medical management for inpatient care and reasonable inpatient medical practice.     RATIONALE FOR DETERMINATION     Ms. Bocanegra is a 63 yo female with a PMH of  insulin-requiring DM, hepatic cirrhosis and gout who presents to the ED with right hand swelling and pain. Concerns for early sepsis/SIRS with tachy, tachypnea, elevated CRP, lactic acidosis and acute renal failure on presentation.  MRI imaging with evidence of effusion with synovitis in the middle finger metacarpophalangeal joint on the right hand.  Ortho hand surgeon consulted; recommending IR consult for guided aspiration of the affected joint with cx and crystal analysis.  ID consult requesting and is pending.     She is remaining in the hospital for further medical evaluation, serial hand examinations, strict elevation of the hand and continued empiric tx for acute cellulitis with IV abx.        At the time of admission with the information available to the attending physician more than 2 nights Hospital complex care was anticipated, based on patient risk of adverse outcome if treated as outpatient and complex care required. Inpatient admission is appropriate based on the Medicare guidelines.       The information on this document is developed by the utilization review team in order for the business office to ensure compliance. This only denotes the appropriateness of proper admission status and does not reflect the quality of care rendered.   The definitions of Inpatient Status and Observation Status used in making the  determination above are those provided in the CMS Coverage Manual, Chapter 1 and Chapter 6, section 70.4.         Sincerely,     Gauri Barnhart, DO  Utilization Review  Physician Advisor  [facility].

## 2024-04-16 ENCOUNTER — APPOINTMENT (OUTPATIENT)
Dept: INTERPRETER SERVICES | Facility: CLINIC | Age: 64
End: 2024-04-16
Payer: COMMERCIAL

## 2024-04-16 ENCOUNTER — TELEPHONE (OUTPATIENT)
Dept: CARDIOLOGY | Facility: CLINIC | Age: 64
End: 2024-04-16
Payer: COMMERCIAL

## 2024-04-16 LAB
ANION GAP SERPL CALCULATED.3IONS-SCNC: 14 MMOL/L (ref 7–15)
BUN SERPL-MCNC: 10.4 MG/DL (ref 8–23)
CALCIUM SERPL-MCNC: 7.8 MG/DL (ref 8.8–10.2)
CHLORIDE SERPL-SCNC: 109 MMOL/L (ref 98–107)
CREAT SERPL-MCNC: 1.14 MG/DL (ref 0.51–0.95)
CRP SERPL-MCNC: 32 MG/L
DEPRECATED HCO3 PLAS-SCNC: 18 MMOL/L (ref 22–29)
EGFRCR SERPLBLD CKD-EPI 2021: 54 ML/MIN/1.73M2
ERYTHROCYTE [DISTWIDTH] IN BLOOD BY AUTOMATED COUNT: 13.5 % (ref 10–15)
GLUCOSE BLDC GLUCOMTR-MCNC: 164 MG/DL (ref 70–99)
GLUCOSE BLDC GLUCOMTR-MCNC: 272 MG/DL (ref 70–99)
GLUCOSE BLDC GLUCOMTR-MCNC: 92 MG/DL (ref 70–99)
GLUCOSE BLDC GLUCOMTR-MCNC: 92 MG/DL (ref 70–99)
GLUCOSE SERPL-MCNC: 88 MG/DL (ref 70–99)
HCT VFR BLD AUTO: 38 % (ref 35–47)
HGB BLD-MCNC: 11.7 G/DL (ref 11.7–15.7)
MCH RBC QN AUTO: 28.1 PG (ref 26.5–33)
MCHC RBC AUTO-ENTMCNC: 30.8 G/DL (ref 31.5–36.5)
MCV RBC AUTO: 91 FL (ref 78–100)
PLATELET # BLD AUTO: 188 10E3/UL (ref 150–450)
POTASSIUM SERPL-SCNC: 3.9 MMOL/L (ref 3.4–5.3)
RBC # BLD AUTO: 4.17 10E6/UL (ref 3.8–5.2)
SODIUM SERPL-SCNC: 141 MMOL/L (ref 135–145)
WBC # BLD AUTO: 8.6 10E3/UL (ref 4–11)

## 2024-04-16 PROCEDURE — 99232 SBSQ HOSP IP/OBS MODERATE 35: CPT

## 2024-04-16 PROCEDURE — 250N000013 HC RX MED GY IP 250 OP 250 PS 637: Performed by: HOSPITALIST

## 2024-04-16 PROCEDURE — 250N000011 HC RX IP 250 OP 636

## 2024-04-16 PROCEDURE — 36415 COLL VENOUS BLD VENIPUNCTURE: CPT | Performed by: INTERNAL MEDICINE

## 2024-04-16 PROCEDURE — 86140 C-REACTIVE PROTEIN: CPT | Performed by: PHYSICIAN ASSISTANT

## 2024-04-16 PROCEDURE — 85027 COMPLETE CBC AUTOMATED: CPT | Performed by: INTERNAL MEDICINE

## 2024-04-16 PROCEDURE — 99232 SBSQ HOSP IP/OBS MODERATE 35: CPT | Performed by: INTERNAL MEDICINE

## 2024-04-16 PROCEDURE — 80048 BASIC METABOLIC PNL TOTAL CA: CPT | Performed by: INTERNAL MEDICINE

## 2024-04-16 PROCEDURE — 120N000001 HC R&B MED SURG/OB

## 2024-04-16 RX ADMIN — CEFAZOLIN 1 G: 1 INJECTION, POWDER, FOR SOLUTION INTRAMUSCULAR; INTRAVENOUS at 14:17

## 2024-04-16 RX ADMIN — ALLOPURINOL 100 MG: 100 TABLET ORAL at 20:45

## 2024-04-16 RX ADMIN — ALLOPURINOL 100 MG: 100 TABLET ORAL at 10:21

## 2024-04-16 RX ADMIN — RIVAROXABAN 15 MG: 15 TABLET, FILM COATED ORAL at 18:03

## 2024-04-16 RX ADMIN — ATORVASTATIN CALCIUM 20 MG: 10 TABLET, FILM COATED ORAL at 10:24

## 2024-04-16 RX ADMIN — LISINOPRIL 20 MG: 20 TABLET ORAL at 10:21

## 2024-04-16 RX ADMIN — CEFAZOLIN 1 G: 1 INJECTION, POWDER, FOR SOLUTION INTRAMUSCULAR; INTRAVENOUS at 05:28

## 2024-04-16 RX ADMIN — RIVAROXABAN 15 MG: 15 TABLET, FILM COATED ORAL at 10:22

## 2024-04-16 RX ADMIN — INSULIN ASPART 2 UNITS: 100 INJECTION, SOLUTION INTRAVENOUS; SUBCUTANEOUS at 13:04

## 2024-04-16 RX ADMIN — CEFAZOLIN 1 G: 1 INJECTION, POWDER, FOR SOLUTION INTRAMUSCULAR; INTRAVENOUS at 21:43

## 2024-04-16 RX ADMIN — GLIPIZIDE 10 MG: 10 TABLET, FILM COATED, EXTENDED RELEASE ORAL at 10:23

## 2024-04-16 ASSESSMENT — ACTIVITIES OF DAILY LIVING (ADL)
ADLS_ACUITY_SCORE: 25
ADLS_ACUITY_SCORE: 26
ADLS_ACUITY_SCORE: 29
ADLS_ACUITY_SCORE: 25
ADLS_ACUITY_SCORE: 25
ADLS_ACUITY_SCORE: 26
ADLS_ACUITY_SCORE: 25
ADLS_ACUITY_SCORE: 29
ADLS_ACUITY_SCORE: 26
ADLS_ACUITY_SCORE: 27
ADLS_ACUITY_SCORE: 29
ADLS_ACUITY_SCORE: 29
ADLS_ACUITY_SCORE: 26
ADLS_ACUITY_SCORE: 29
ADLS_ACUITY_SCORE: 25
ADLS_ACUITY_SCORE: 29
ADLS_ACUITY_SCORE: 25
ADLS_ACUITY_SCORE: 25
ADLS_ACUITY_SCORE: 29

## 2024-04-16 NOTE — PLAN OF CARE
Goal Outcome Evaluation:      Pt slept well in between cares. Daughter in room assisting with interpretation. VSS. Denies pain. No significant changes noted with right hand/wrist redness and edema. Cont to apply intermittent cold packs for comfort. SBA to bathroom and voiding  adequately. Remains NPO. Plan of care ongoing.  Problem: Skin or Tissue Infection  Goal: Absence of Infection Signs and Symptoms  Outcome: Progressing     Problem: Pain Acute  Goal: Optimal Pain Control and Function  Outcome: Progressing

## 2024-04-16 NOTE — PROGRESS NOTES
[facility]    Medicine Progress Note - Hospitalist Service    Date of Admission:  4/14/2024    Assessment & Plan      José Luis Bocanegra is a 64 year old female with history of pulmonary embolism, hypertension, GERD, and morbid obesity admitted on 4/14/2024 with painful acute on chronic right hand swelling and erythema.     MRI showed subcutaneous edema dorsally suggestive of cellulitis, possible early developing septic arthritis, mild tenosynovitis of the flexor digitorum tendons of the ring finger with edema - inflammation in the adjacent lumbrical muscles and extensor carpi ulnaris tendinopathy and likely high-grade tearing distally. Blood cultures obtained grew Staphylococcus epidermidis deemed to be a contaminant per ID specialist. Arthrocentesis attempt was unsuccessful due to inadequate synovial fluid accumulation.  No plans for surgical intervention per surgery service.     She was placed on IV ceftriaxone on admission and was subsequently transitioned to IV cefazolin as recommended by ID specialist with plan to transition to oral antibiotics on 4/17/2024 if clinical condition continues to improve.  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       Right hand cellulitis  Lactic acidosis  Tenosynovitis  MRI showed subcutaneous edema dorsally suggestive of cellulitis, possible early developing septic arthritis, mild tenosynovitis of the flexor digitorum tendons of the ring finger with edema - inflammation in the adjacent lumbrical muscles and extensor carpi ulnaris tendinopathy and likely high-grade tearing distally. Blood cultures obtained grew Staphylococcus epidermidis deemed to be a contaminant per ID specialist. Arthrocentesis attempt was unsuccessful due to inadequate synovial fluid accumulation.  No plans for surgical intervention per surgery service.     Continue cefazolin with plan  to transition to oral antibiotic on 4/17/2024 if symptoms continue to improve per ID specialist.  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 as arranged by pulmonologist  -Might require VQ scan and right heart catheterization in the future  -Pulmonologist signed off- 4/15/24    CKD stage 3b  -renal functions at baseline  -monitor    Chronic gout  -continue allopurinol    Hyperlipidemia  -continue lipitor    Type 2 diabetes mellitus not on long term insulin  -continue glipizide  -monitor glucose ac and hs  -sliding scale insulin  -hypoglycemic protocol    Essential hypertension  -continue lisinopril    Hepatic cirrhosis  -seen on ct  -monitor    4.2 cm ascending thoracic aortic aneurysm  -stable  -outpatient follow up      Diet: Regular Diet Adult    DVT Prophylaxis: Pneumatic Compression Devices  Hilliard Catheter: Not present  Lines: None     Cardiac Monitoring: None  Code Status: Full Code      Clinically Significant Risk Factors                  # Hypertension: Noted on problem list       # DMII: A1C = 9.2 % (Ref range: <5.7 %) within past 6 months, PRESENT ON ADMISSION  # Obesity: Estimated body mass index is 38.38 kg/m  as calculated from the  "following:    Height as of this encounter: 1.499 m (4' 11\").    Weight as of this encounter: 86.2 kg (190 lb)., PRESENT ON ADMISSION            Disposition Plan     Medically Ready for Discharge: Anticipated in 2-4 Days    Emilie Perry MD  Hospitalist Service  [facility]  Securely message with [employer] (more info)  Text page via [employer] Paging/Directory   ______________________________________________________________________    Interval History   No new complaints today. Pain in her right hand has subsided. Blood cultures obtained overnight have cultured Staphylococcus epidermidis deemed to be all contaminants per ID specialist. She denies fever or chills. Yesterday's arthrocentesis attempt was unsuccessful due to inadequate synovial fluid accumulation.  No plans for repeat intervention per surgery service. Her daughter assisted with translation at bedside.    Physical Exam   Vital Signs: Temp: 99.2  F (37.3  C) Temp src: Oral BP: 136/67 Pulse: 83   Resp: 18 SpO2: 93 % O2 Device: None (Room air)    Weight: 190 lbs 0 oz    General appearance: Awake, Alert, Cooperative, not in any obvious distress and appears stated age   HEENT: Normocephalic, atraumatic, conjunctiva clear without icterus and ears without discharge  Lungs: Clear to auscultation bilaterally, no wheezing, good air exchange, normal work of breathing  Cardiovascular: Regular Rate and Rythm, normal apical impulse, normal S1 and S2, no lower extremity edema bilaterally  Abdomen: Soft, non-tender and Non-distended, active bowel sounds  Skin: Skin color, texture normal and bruising or bleeding. No rashes or lesions over face, neck, arms and legs, turgor normal.  Musculoskeletal: Swollen-worse in the dorsal aspect and tender  Neurologic: Alert & Oriented X 3, Facial symmetry preserved and upper & lower extremities moving well with symmetry  Psychiatric: Calm, normal eye contact and normal affect      Medical Decision Making       40 " MINUTES SPENT BY ME on the date of service doing chart review, history, exam, documentation & further activities per the note.      Data     I have personally reviewed the following data over the past 24 hrs:    8.6  \   11.7   / 188     141 109 (H) 10.4 /  272 (H)   3.9 18 (L) 1.14 (H) \     Procal: N/A CRP: 32.00 (H) Lactic Acid: N/A         Imaging results reviewed over the past 24 hrs:   No results found for this or any previous visit (from the past 24 hour(s)).

## 2024-04-16 NOTE — PROGRESS NOTES
"Orthopedic Progress Note      Assessment:  64-year-old female with right dorsal hand erythema and swelling likely cellulitis versus gout.  Unable to have aspiration performed due to insufficient fluid in the MCP joint.  Clinically dorsal hand swelling and erythema is improving today.  Pain is more significant dorsally than volar although there is some mild tenderness over the volar aspect of the ring finger      Plan:   - No plan for surgical intervention today.  Okay to eat from our perspective   -Continue empiric antibiotic treatment per infectious disease team  -continue hand elevation and icing  -Pain control per primary team   - will continue to follow and perform serial examinations      Subjective:  Patient feels like swelling and pain in the hand are improving today.  Denies fever/chills.  No acute events overnight.  Patient is curious about when she may be able to discharge home.  All questions/concerns answered today.      Objective:  BP (!) 140/83 (BP Location: Right arm)   Pulse 85   Temp 98.4  F (36.9  C) (Oral)   Resp 20   Ht 1.499 m (4' 11\")   Wt 86.2 kg (190 lb)   SpO2 91%   BMI 38.38 kg/m      The patient is A&Ox3. Appears comfortable, lying in bed dorsum of the hand is swollen and erythematous,  This is improving compared to yesterdays exam.  She is unable to make a tight fist but flexion and extension grossly intact in all joints of the hand/fingers.  No pain with flexion/extension of the wrist.  She does have pain with passive extension of the ring finger and is mildly tender to palpation over the volar aspect of this finger but pain is primarily in the dorsal aspect of the finger localized over the dorsal MCP joint.  Sensation is intact in all fingers distally      No drain     Pertinent Labs   Lab Results: personally reviewed.   No results found for: \"INR\", \"PROTIME\"  Lab Results   Component Value Date    WBC 8.6 04/16/2024    HGB 11.7 04/16/2024    HCT 38.0 04/16/2024    MCV 91 " 04/16/2024     04/16/2024     Lab Results   Component Value Date     04/16/2024    CO2 18 (L) 04/16/2024         Report completed by:  GEE HUERTA PA-C  Date: 04/16/2024    [facility]          Spoke with our PA Gee Huerta today and reviewed today's plan.  Came in today to see patient, repeat examination, and answering questions.  I agree with the note as documented above I will emphasize the following points as follows.  The pain and swelling over the dorsum of the right hand is improving.  The erythema has significantly receded.  She has some palpation over the volar surface of the ring metacarpophalangeal joint phalangeal joint and mild pain over this area with passive extension of this area.  No pain with passive flexion or palpation of the metacarpophalangeal joint. There is no significant fusiform swelling.  Her MRI does note some tenosynovitis along the flexor tendons of the ring finger and edema within the lumbricals in this area. Her aspiration of the middle finger metacarpophalangeal joint yesterday was dry.      I reviewed with her and her family that I believe the infection over the dorsum of the hand is a cellulitis.  I reviewed with her that the pain volarly may be an evolving early flexor tenosynovitis or edema and tenosynovitis reactive to her cellulitis.  We also discussed the potential for septic ring finger metacarpophalangeal joint arthritis although I think this is unlikely based on her exam.  We discussed her progress.  Overall she feels like her pain is improving with IV antibiotics.  Her swelling is also decreasing.  We discussed continuing to monitor IV antibiotics with elevation and serial examinations.  We discussed making her n.p.o. at midnight in case the pain volarly worsens and there may be a role for intervention.  Her and her family were in agreement with this.  They did ask about the swelling and and we discussed elevation to assist with this.  I  answered her and her family's questions.  We formulated the plan using shared decision making.    Signed,  Lanre Harris MD

## 2024-04-16 NOTE — PLAN OF CARE
Problem: Pain Acute  Goal: Optimal Pain Control and Function  Intervention: Develop Pain Management Plan  Recent Flowsheet Documentation  Taken 4/15/2024 1700 by Kenisha Mensah, RN  Pain Management Interventions: cold applied     Problem: Skin or Soft Tissue Infection  Goal: Absence of Infection Signs and Symptoms  Intervention: Minimize and Manage Infection Progression  Recent Flowsheet Documentation  Taken 4/15/2024 1700 by Kenisha Mensah, RN  Infection Prevention: hand hygiene promoted   Goal Outcome Evaluation:       Pt was admitted from ED to room 110 via W/C around 1600. Pt is Hmong speaking but family at bedside at all time and assists with interpretation. Pt reports some discomfort on right hand/wrist area but not  significant enough for pain med. Appetite good. AC/HS Blood sugar were 127/143. BC from 4/14 came back positive GPC in cluster. MD was updated. Repeat BC done followed with 1st dose IV abx. Pt in bed now resting. Plan of care on going.

## 2024-04-16 NOTE — PROGRESS NOTES
Infectious Disease Progress Note    Assessment/Plan  Impression: Acute on chronic right hand pain, swelling, erythema.  MRI nonspecific for infection although some tenosynovitis is described.  Doing better today with less pain, swelling, erythema.      Right lower extremity DVT and pulmonary emboli.     Unclear to me how much of the chronic swelling might be related to chronic pulmonary emboli.     Patient also has history of gout and uncontrolled diabetes mellitus.    Single blood cultures with GPC, genotypically S epi.  Suspect contaminant.     Recommendations: Would continue empiric antimicrobial chemotherapy with cefazolin pending further culture reports.  If this is a nonpurulent cellulitis, cefazolin should be sufficient.  If this is impending septic arthritis, cefazolin may be helpful depending on the underlying organism.  Consider changing to po antibiotics on 4/17 if still doing well.      Continue keeping right hand elevated.     Active Problems:    Lactic acidosis    Cellulitis    Noncompliance with medication regimen    Swelling of right hand    Chronic pulmonary embolism without acute cor pulmonale, unspecified pulmonary embolism type (H)      JUVENTINO BURNETT MD  [phone]      Subjective  Says she is better.  Less pain, swelling and erythema in R hand.     Objective    Vital signs in last 24 hours  Temp:  [98.3  F (36.8  C)-98.6  F (37  C)] 98.4  F (36.9  C)  Pulse:  [81-88] 85  Resp:  [18-20] 20  BP: (140-174)/(79-95) 140/83  SpO2:  [91 %-93 %] 91 %  Wt Readings from Last 3 Encounters:   04/14/24 86.2 kg (190 lb)   03/29/24 90.3 kg (199 lb)   01/10/23 (!) 191 kg (421 lb 1.3 oz)           Intake/Output last 3 shifts  I/O last 3 completed shifts:  In: 420 [P.O.:420]  Out: -   Intake/Output this shift:  No intake/output data recorded.    Review of Systems   Pertinent items are noted in HPI., otherwise negative.    Physical Exam  General appearance: alert, appears stated age, and cooperative  Head:  "Normocephalic, without obvious abnormality, atraumatic  Eyes: Extraocular muscles intact, no icterus  Throat: lips, mucosa, and tongue normal; teeth and gums normal  Neck: no adenopathy and supple, symmetrical, trachea midline  Lungs: clear to auscultation bilaterally  Heart: Regular rate and rhythm, no murmur  Abdomen: soft, non-tender; bowel sounds normal; no masses,  no organomegaly  Extremities: She has decreased edema in the right hand and decreased  erythema over the dorsum of the hand.  Prominent veins in the right forearm.  No fluctuance is appreciated.  Skin: Skin color, texture, turgor normal. No rashes or lesions  Lymph nodes: Cervical, supraclavicular, and axillary nodes normal.  Neurologic: Grossly normal    Pertinent Labs   Lab Results: personally reviewed.     Recent Labs   Lab 04/16/24  0851 04/15/24  0816 04/14/24 1936   WBC 8.6 10.7 10.2   HGB 11.7 11.8 13.0   HCT 38.0 36.9 41.4    241 245        Recent Labs   Lab 04/16/24  0851 04/15/24  0816 04/14/24 1936    139 140   CO2 18* 22 23   BUN 10.4 12.5 14.6     Creatinine   Date Value Ref Range Status   04/16/2024 1.14 (H) 0.51 - 0.95 mg/dL Final       No results found for: \"CULT\"  7-Day Micro Results       Collected Updated Procedure Result Status      04/15/2024 2055 04/15/2024 2102 Blood Culture Arm, Left [[id number]]   Blood from Arm, Left    In process Component Value   No component results               04/15/2024 2054 04/15/2024 2102 Blood Culture Arm, Right [[id number]]   Blood from Arm, Right    In process Component Value   No component results               04/14/2024 2303 04/16/2024 0031 Blood Culture Peripheral Blood [[id number]]   Peripheral Blood    Preliminary result Component Value   Culture No growth after 1 day  [P]                04/14/2024 2259 04/15/2024 1942 Blood Culture Peripheral Blood [[id number]]   (Abnormal)   Peripheral Blood    Preliminary result Component Value   Culture Positive on the 1st day of " incubation  [P]     Gram positive cocci in clusters  [P]     1 of 2 bottles               04/14/2024 2259 04/15/2024 2215 Verigene GP Panel [[id number]]    (Abnormal)   Peripheral Blood    Final result Component Value   Staphylococcus aureus Not Detected   Staphylococcus epidermidis Detected   Positive for Staphylococcus epidermidis and negative for the mecA gene (not resistant to methicillin) by [employer] multiplex nucleic acid test. Final identification and antimicrobial susceptibility testing will be verified by standard methods.   Staphylococcus lugdunensis Not Detected   Enterococcus faecalis Not Detected   Enterococcus faecium Not Detected   Streptococcus species Not Detected   Streptococcus agalactiae Not Detected   Streptococcus anginosus group Not Detected   Streptococcus pneumoniae Not Detected   Streptococcus pyogenes Not Detected   Listeria species Not Detected                    Pertinent Radiology   Radiology Results:   US Upper Extremity Non Vascular Right    Result Date: 4/15/2024  EXAM: US UPPER EXTREMITY NON VASCULAR RIGHT LOCATION: [facility] DATE: 4/15/2024 INDICATION: Hand swelling. Minimal fluid near the third digit MCP right hand on MRI. COMPARISON: MRI 04/15/2024. TECHNIQUE: Routine. FINDINGS: Subcutaneous right hand edema present. Not enough of joint fluid was present along the third digit MCP for aspiration. No aspiration performed.     MR Hand Right w/o Contrast    Result Date: 4/15/2024  EXAM: MR HAND RIGHT W/O CONTRAST LOCATION: [facility] DATE: 4/15/2024 INDICATION: Hand swelling and redness. Concern for abscess. COMPARISON: X-rays 04/14/2024 TECHNIQUE: Unenhanced. FINDINGS: TENDONS: -Extensor tendons: Tendinopathy and likely high-grade tearing extensor carpi ulnaris tendon, not definitively evaluated due to motion. There are likely thin intact tendon fibers. Edema adjacent to the tendon. No abnormal tendon sheath fluid. -Flexor  tendons: No tendon tear or tendinopathy. Edema and minimal fluid along the flexor digitorum tendons of the ring finger compatible with tenosynovitis. Edema in the adjacent lumbrical muscles. LIGAMENTS: -No evidence of collateral ligament tear. JOINTS AND BONES: -There is likely mild degenerative arthritis involving multiple joints in the wrist wrist, at the edge of the field-of-view, and minimal radiocarpal joint effusion or synovitis. Minimal fluid or synovitis of the third MCP joint with mild capsular edema. Minimal effusion or synovitis of the PIP joints of the ring and middle fingers. MUSCLES AND SOFT TISSUES: -Poorly defined subcutaneous edema dorsally, extending into the ring finger. No fluid collection.     IMPRESSION: 1.  Subcutaneous edema dorsally could represent cellulitis. No abscess. 2.  Minimal effusion or synovitis of the third MCP joint and mild capsular edema is nonspecific and could be reactive or inflammatory. Early developing septic arthritis is a possibility. Consider short interval follow-up with contrast enhanced MRI. 3.  Mild tenosynovitis of the flexor digitorum tendons of the ring finger with edema - inflammation in the adjacent lumbrical muscles. 4.  Extensor carpi ulnaris tendinopathy and likely high-grade tearing distally..    XR Hand Right G/E 3 Views    Result Date: 4/14/2024  EXAM: XR HAND RIGHT G/E 3 VIEWS LOCATION: [facility] DATE: 4/14/2024 INDICATION: right hand swelling COMPARISON: None.     IMPRESSION: There is diffuse soft tissue swelling of the dorsum of the hand and wrist, no radiopaque foreign bodies are identified. No acute fracture or malalignment of the bony structures are noted. There is mild degenerative changes of the radiocarpal and ulnocarpal joints with subtle erosive changes of the ulnar styloid process present. Degenerative changes of the PIP and DIP joints of the hand are also noted.    CT Chest Pulmonary Embolism w Contrast    Result  Date: 4/14/2024  EXAM: CT CHEST PULMONARY EMBOLISM W CONTRAST LOCATION: [facility] DATE: 4/14/2024 INDICATION: Known right-sided DVT; noncompliance on anticoagulation. COMPARISON: CT chest 03/29/2024. TECHNIQUE: CT chest pulmonary angiogram during arterial phase injection of IV contrast. Multiplanar reformats and MIP reconstructions were performed. Dose reduction techniques were used. CONTRAST: 75 mL Isovue-370. FINDINGS: Patient was imaged with arm(s) at side, limiting study quality. ANGIOGRAM CHEST: No acute pulmonary embolism. Unchanged thin linear filling defect at the bifurcation of the distal left main pulmonary artery, compatible with chronic thrombus, series 4 image 151. Main pulmonary artery is mildly enlarged, measuring up to 3.7 cm, similar to prior. Ascending thoracic aortic aneurysm, measuring 4.2 cm, unchanged. LUNGS AND PLEURA: Trachea and large airways are patent. No acute airspace consolidation. Unchanged linear scarring and volume loss within the right middle and right upper lobes.  No suspicious pulmonary nodule. No pneumothorax. Mild chronic right pleural thickening. No acute pleural effusion.  MEDIASTINUM/AXILLAE: Unchanged mild mediastinal lymphadenopathy, as compared to 04/01/2022. For reference, a right pretracheal lymph node measures 10 mm in short axis, series 4 image 96. No mediastinal lymphadenopathy. Thoracic esophagus is unremarkable.  Mild right axillary lymphadenopathy, unchanged from 04/01/2022. For reference, a right axillary lymph node measures up to 12 mm in short axis, series 4 image 50. Chest wall is unremarkable. Heart is normal in size. No pericardial effusion. CORONARY ARTERY CALCIFICATION: None. UPPER ABDOMEN: Subtle liver surface nodularity, suggesting underlying cirrhosis. Cholelithiasis. Persistent fetal lobulation of both kidneys. MUSCULOSKELETAL: No suspicious abnormality. OTHER: No additionally suspicious abnormality.     IMPRESSION: 1.  No  acute pulmonary embolism. 2.  Chronic weblike thrombus within the distal left main pulmonary artery. 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 Pulmonology follow-up is recommended. 3.  Mild mediastinal and right axillary lymphadenopathy is unchanged from 2022 and likely reactive/benign. No further follow-up is recommended. 4.  Unchanged 4.2 cm ascending thoracic aortic aneurysm. 5.  Hepatic cirrhosis. 6.  Cholelithiasis. [Recommend Follow Up: Pulmonology follow-up.] This report will be copied to the [facility] to ensure a provider acknowledges the finding.     US Upper Extremity Venous Duplex Right    Result Date: 4/14/2024  EXAM: US UPPER EXTREMITY VENOUS DUPLEX RIGHT LOCATION: [facility] DATE: 4/14/2024 INDICATION: Right hand swelling, dx of DVT, not taking anticoagulation COMPARISON: None. TECHNIQUE: Venous Duplex ultrasound of the right upper extremity with (when possible) and without compression, augmentation, and duplex. Color flow and spectral Doppler with waveform analysis performed. FINDINGS: Ultrasound includes evaluation of the internal jugular vein, innominate vein, subclavian vein, axillary vein, and brachial vein. The superficial cephalic and basilic veins were also evaluated where seen. RIGHT: No deep venous thrombosis. No superficial thrombophlebitis.     IMPRESSION: 1.  No deep venous thrombosis in the right upper extremity.

## 2024-04-16 NOTE — PLAN OF CARE
Problem: Adult Inpatient Plan of Care  Goal: Plan of Care Review  Description: The Plan of Care Review/Shift note should be completed every shift.  The Outcome Evaluation is a brief statement about your assessment that the patient is improving, declining, or no change.  This information will be displayed automatically on your shift  note.  Outcome: Progressing   Goal Outcome Evaluation:       Plan of care discussed with daughter in room interperting. iV. Antibiotics and monitor redness of right hand.  Problem: Comorbidity Management  Goal: Blood Glucose Levels Within Targeted Range  Outcome: Progressing     Carb. Controlled diet with glucose checks. Sliding scale insulin.

## 2024-04-16 NOTE — TELEPHONE ENCOUNTER
[facility] Call Center    Phone Message    May a detailed message be left on voicemail: no     Reason for Call: Appointment Intake    Referring Provider Name: Zacarias Noriega MD in [facility] PULMONOLOGY   Diagnosis and/or Symptoms:     Chronic thromboembolic disease            Additional Information:   CTED/CTEPH. eval for RHC, pulmonary vasodilators, balloon PA angioplasty        Please call pt to schedule with Dr. Gan.    Action Taken: Message routed to:  [facility] ([facility] cardio    Travel Screening: Not Applicable

## 2024-04-17 ENCOUNTER — PRE VISIT (OUTPATIENT)
Dept: CARDIOLOGY | Facility: CLINIC | Age: 64
End: 2024-04-17
Payer: COMMERCIAL

## 2024-04-17 ENCOUNTER — APPOINTMENT (OUTPATIENT)
Dept: INTERPRETER SERVICES | Facility: CLINIC | Age: 64
End: 2024-04-17
Payer: COMMERCIAL

## 2024-04-17 LAB
BACTERIA BLD CULT: ABNORMAL
BACTERIA BLD CULT: ABNORMAL
GLUCOSE BLDC GLUCOMTR-MCNC: 153 MG/DL (ref 70–99)
GLUCOSE BLDC GLUCOMTR-MCNC: 163 MG/DL (ref 70–99)
GLUCOSE BLDC GLUCOMTR-MCNC: 220 MG/DL (ref 70–99)
GLUCOSE BLDC GLUCOMTR-MCNC: 239 MG/DL (ref 70–99)
GLUCOSE BLDC GLUCOMTR-MCNC: 69 MG/DL (ref 70–99)
GLUCOSE BLDC GLUCOMTR-MCNC: 78 MG/DL (ref 70–99)

## 2024-04-17 PROCEDURE — 250N000011 HC RX IP 250 OP 636

## 2024-04-17 PROCEDURE — 120N000001 HC R&B MED SURG/OB

## 2024-04-17 PROCEDURE — 250N000013 HC RX MED GY IP 250 OP 250 PS 637

## 2024-04-17 PROCEDURE — 99232 SBSQ HOSP IP/OBS MODERATE 35: CPT | Performed by: INTERNAL MEDICINE

## 2024-04-17 PROCEDURE — 99232 SBSQ HOSP IP/OBS MODERATE 35: CPT

## 2024-04-17 PROCEDURE — 250N000013 HC RX MED GY IP 250 OP 250 PS 637: Performed by: HOSPITALIST

## 2024-04-17 RX ORDER — CEPHALEXIN 500 MG/1
500 CAPSULE ORAL EVERY 6 HOURS SCHEDULED
Status: DISCONTINUED | OUTPATIENT
Start: 2024-04-17 | End: 2024-04-18 | Stop reason: HOSPADM

## 2024-04-17 RX ADMIN — ATORVASTATIN CALCIUM 20 MG: 10 TABLET, FILM COATED ORAL at 10:11

## 2024-04-17 RX ADMIN — ALLOPURINOL 100 MG: 100 TABLET ORAL at 21:06

## 2024-04-17 RX ADMIN — CEPHALEXIN 500 MG: 500 CAPSULE ORAL at 13:03

## 2024-04-17 RX ADMIN — LISINOPRIL 20 MG: 20 TABLET ORAL at 10:12

## 2024-04-17 RX ADMIN — GLIPIZIDE 10 MG: 10 TABLET, FILM COATED, EXTENDED RELEASE ORAL at 10:19

## 2024-04-17 RX ADMIN — CEPHALEXIN 500 MG: 500 CAPSULE ORAL at 17:50

## 2024-04-17 RX ADMIN — CEFAZOLIN 1 G: 1 INJECTION, POWDER, FOR SOLUTION INTRAMUSCULAR; INTRAVENOUS at 05:29

## 2024-04-17 RX ADMIN — CEPHALEXIN 500 MG: 500 CAPSULE ORAL at 23:42

## 2024-04-17 RX ADMIN — RIVAROXABAN 15 MG: 15 TABLET, FILM COATED ORAL at 17:50

## 2024-04-17 RX ADMIN — INSULIN ASPART 1 UNITS: 100 INJECTION, SOLUTION INTRAVENOUS; SUBCUTANEOUS at 12:53

## 2024-04-17 RX ADMIN — ALLOPURINOL 100 MG: 100 TABLET ORAL at 10:11

## 2024-04-17 RX ADMIN — RIVAROXABAN 15 MG: 15 TABLET, FILM COATED ORAL at 10:19

## 2024-04-17 ASSESSMENT — ACTIVITIES OF DAILY LIVING (ADL)
ADLS_ACUITY_SCORE: 24
ADLS_ACUITY_SCORE: 25
ADLS_ACUITY_SCORE: 27
ADLS_ACUITY_SCORE: 27
ADLS_ACUITY_SCORE: 24
ADLS_ACUITY_SCORE: 24
ADLS_ACUITY_SCORE: 27
ADLS_ACUITY_SCORE: 24
ADLS_ACUITY_SCORE: 25
ADLS_ACUITY_SCORE: 24
ADLS_ACUITY_SCORE: 24
ADLS_ACUITY_SCORE: 27
ADLS_ACUITY_SCORE: 25
ADLS_ACUITY_SCORE: 24
ADLS_ACUITY_SCORE: 27
ADLS_ACUITY_SCORE: 24

## 2024-04-17 NOTE — PROGRESS NOTES
"Orthopedic Progress Note      Assessment:  64-year-old female with right dorsal hand erythema and swelling likely cellulitis versus gout.  Unable to have aspiration performed due to insufficient fluid in the MCP joint.  Clinically dorsal hand swelling and erythema is improving today.        Plan:   - No plan for surgical intervention today.  Okay to eat from our perspective   -Continue empiric antibiotic treatment per infectious disease team.  At this point we think it be reasonable to transition to oral antibiotics but defer to infectious disease recommendations.  -We would recommend that even if switching to orals she stays 1 more day to make sure she is continuing to have improvement, would be reasonable to discharge tomorrow.  -continue hand elevation and icing  -Pain control per primary team   -She should follow-up with her primary care provider to check in in about 5 to 7 days, see hand surgery if needed      Subjective:  Patient feels like swelling and pain in the hand are improving today.  Denies fever/chills.  No acute events overnight.  Patient is curious about when she may be able to discharge home.  All questions/concerns answered today.      Objective:  BP (!) 141/90 (BP Location: Left arm)   Pulse 83   Temp 98.4  F (36.9  C) (Oral)   Resp 18   Ht 1.499 m (4' 11\")   Wt 86.2 kg (190 lb)   SpO2 91%   BMI 38.38 kg/m      The patient is A&Ox3. Appears comfortable, lying in bed dorsum of the hand is swollen and erythematous, This is greatly improving compared to yesterdays exam.  She is unable to make a tight fist but flexion and extension grossly intact in all joints of the hand/fingers.  No pain with flexion/extension of the wrist.  She does have pain with passive extension of the ring finger and is mildly tender to palpation over the volar aspect of this finger, this pain is lesser than yesterday.    Sensation is intact in all fingers distally      No drain     Pertinent Labs   Lab Results: " "personally reviewed.   No results found for: \"INR\", \"PROTIME\"  Lab Results   Component Value Date    WBC 8.6 04/16/2024    HGB 11.7 04/16/2024    HCT 38.0 04/16/2024    MCV 91 04/16/2024     04/16/2024     Lab Results   Component Value Date     04/16/2024    CO2 18 (L) 04/16/2024         Report completed by:  GEE HUERTA PA-C  Date: 04/17/2024    [facility]          Patient patient with Gee BARRIENTOS. I have reviewed the patient's history and laboratory studies. I agree with the above assessment and plan.   Signed,  Lanre Harris MD    "

## 2024-04-17 NOTE — PLAN OF CARE
Problem: Comorbidity Management  Goal: Blood Glucose Levels Within Targeted Range  Outcome: Progressing  Intervention: Monitor and Manage Glycemia  Recent Flowsheet Documentation  Taken 4/17/2024 0101 by Jon Michel, RN  Medication Review/Management: medications reviewed  Taken 4/16/2024 2045 by Jon Michel, RN  Medication Review/Management: medications reviewed     Problem: Skin or Soft Tissue Infection  Goal: Absence of Infection Signs and Symptoms  Outcome: Progressing  Intervention: Minimize and Manage Infection Progression  Recent Flowsheet Documentation  Taken 4/17/2024 0101 by Jon Michel, RN  Infection Prevention: hand hygiene promoted  Taken 4/16/2024 2045 by Jon Michel, RN  Infection Prevention: hand hygiene promoted   Goal Outcome Evaluation:       Pt alert and oriented. Right hand swelling seems to be improving. No complain of pain. Daughter at bedside all night and help with translation. Up independent to the bathroom. Right arm elevated with pillow. IV antibiotic given as ordered.  and 163 no insulin coverage given. NPO after midnight instructed for possible procedure today.

## 2024-04-17 NOTE — PLAN OF CARE
Problem: Adult Inpatient Plan of Care  Goal: Plan of Care Review  Description: The Plan of Care Review/Shift note should be completed every shift.  The Outcome Evaluation is a brief statement about your assessment that the patient is improving, declining, or no change.  This information will be displayed automatically on your shift  note.  Outcome: Progressing   Goal Outcome Evaluation:       Plan of care discussed with daughter in room interpretering. Doctors explained changing antibiotics to oral today and monitor right hand swelling and redness until tomorrow. Verbalizes understanding.  Problem: Adult Inpatient Plan of Care  Goal: Absence of Hospital-Acquired Illness or Injury  Intervention: Identify and Manage Fall Risk  Recent Flowsheet Documentation  Taken 4/17/2024 0800 by Celina Ortiz RN  Safety Promotion/Fall Prevention:   patient and family education   nonskid shoes/slippers when out of bed   No falls risk. Steady gait.  Problem: Comorbidity Management  Goal: Blood Glucose Levels Within Targeted Range  Intervention: Monitor and Manage Glycemia  Recent Flowsheet Documentation  Taken 4/17/2024 0800 by Celina Ortiz RN  Medication Review/Management: medications reviewed   Taking food and fluids without problems. Blood glucose monitored, covered with oral medication and sliding scale insulin. Glucose levels really are not controlled.  Problem: Pain Acute  Goal: Optimal Pain Control and Function  Outcome: Progressing  Intervention: Develop Pain Management Plan  Recent Flowsheet Documentation  Taken 4/17/2024 0800 by Celina Ortiz RN  Pain Management Interventions: cold applied  Intervention: Prevent or Manage Pain  Recent Flowsheet Documentation  Taken 4/17/2024 0800 by Celina Ortiz RN  Medication Review/Management: medications reviewed   Denies any need for pain medication. Right hand is elevated with ice on. Instructed 20 minutes on 20 minutes off, to prevent skin damage.

## 2024-04-17 NOTE — PROGRESS NOTES
Infectious Disease Progress Note    Assessment/Plan  Impression: Acute on chronic right hand pain, swelling, erythema.  MRI nonspecific for infection although some tenosynovitis is described.  Doing better today with less pain, swelling, erythema.      Right lower extremity DVT and pulmonary emboli.     Unclear to me how much of the chronic swelling might be related to chronic pulmonary emboli.     Patient also has history of gout and uncontrolled diabetes mellitus.    Single blood cultures with GPC, genotypically S epi.  Suspect contaminant.     Recommendations: change antibiotics to cephalexin 500 mg po qid x 7 days     Continue keeping right hand elevated.     Active Problems:    Lactic acidosis    Cellulitis    Noncompliance with medication regimen    Swelling of right hand    Chronic pulmonary embolism without acute cor pulmonale, unspecified pulmonary embolism type (H)      JUVENTINO BURNETT MD  [phone]      Subjective  Says she is better.  Less pain, swelling and erythema in R hand. No stomach issues.    Objective    Vital signs in last 24 hours  Temp:  [98.1  F (36.7  C)-99.2  F (37.3  C)] 98.4  F (36.9  C)  Pulse:  [83-91] 83  Resp:  [18] 18  BP: (136-174)/(67-90) 141/90  SpO2:  [91 %-93 %] 91 %  Wt Readings from Last 3 Encounters:   04/14/24 86.2 kg (190 lb)   03/29/24 90.3 kg (199 lb)   01/10/23 (!) 191 kg (421 lb 1.3 oz)           Intake/Output last 3 shifts  No intake/output data recorded.  Intake/Output this shift:  No intake/output data recorded.    Review of Systems   Pertinent items are noted in HPI., otherwise negative.    Physical Exam  General appearance: alert, appears stated age, and cooperative  Head: Normocephalic, without obvious abnormality, atraumatic  Eyes: Extraocular muscles intact, no icterus  Throat: lips, mucosa, and tongue normal; teeth and gums normal  Neck: no adenopathy and supple, symmetrical, trachea midline  Lungs: clear to auscultation bilaterally  Heart: Regular rate  "and rhythm, no murmur  Abdomen: soft, non-tender; bowel sounds normal; no masses,  no organomegaly  Extremities: She has decreased edema in the right hand and decreased  erythema over the dorsum of the hand.  Less Prominent veins in the right forearm.  No fluctuance is appreciated.  Skin: Skin color, texture, turgor normal. No rashes or lesions  Lymph nodes: Cervical, supraclavicular, and axillary nodes normal.  Neurologic: Grossly normal    Pertinent Labs   Lab Results: personally reviewed.     Recent Labs   Lab 04/16/24  0851 04/15/24  0816 04/14/24  1936   WBC 8.6 10.7 10.2   HGB 11.7 11.8 13.0   HCT 38.0 36.9 41.4    241 245        Recent Labs   Lab 04/16/24  0851 04/15/24  0816 04/14/24  1936    139 140   CO2 18* 22 23   BUN 10.4 12.5 14.6     Creatinine   Date Value Ref Range Status   04/16/2024 1.14 (H) 0.51 - 0.95 mg/dL Final       No results found for: \"CULT\"  7-Day Micro Results       Collected Updated Procedure Result Status      04/15/2024 2055 04/16/2024 2331 Blood Culture Arm, Left [[id number]]   Blood from Arm, Left    Preliminary result Component Value   Culture No growth after 1 day  [P]                04/15/2024 2054 04/16/2024 2331 Blood Culture Arm, Right [[id number]]   Blood from Arm, Right    Preliminary result Component Value   Culture No growth after 1 day  [P]                04/14/2024 2303 04/17/2024 0031 Blood Culture Peripheral Blood [[id number]]   Peripheral Blood    Preliminary result Component Value   Culture No growth after 2 days  [P]                04/14/2024 2259 04/17/2024 0757 Blood Culture Peripheral Blood [[id number]]    (Abnormal)   Peripheral Blood    Final result Component Value   Culture Positive on the 1st day of incubation    Staphylococcus epidermidis    1 of 2 bottles        Susceptibility        Staphylococcus epidermidis      BALA      Ciprofloxacin <=0.5 ug/mL Susceptible      Clindamycin 0.5 ug/mL Susceptible      Daptomycin 0.25 ug/mL " "Susceptible      Doxycycline <=0.5 ug/mL Susceptible      Erythromycin <=0.25 ug/mL Susceptible      Gentamicin >=16 ug/mL Resistant      Inducible macrolide resistance test Negative ug/mL Negative  [*]       Levofloxacin <=0.12 ug/mL Susceptible      Linezolid 1 ug/mL Susceptible  [*]       Moxifloxacin <=0.25 ug/mL Susceptible  [*]       Nitrofurantoin <=16 ug/mL Susceptible  [*]       Oxacillin <=0.25 ug/mL Susceptible  [1]       Rifampin <=0.5 ug/mL Susceptible  [*]       Tetracycline <=1 ug/mL Susceptible      Tigecycline <=0.12 ug/mL No interpretation available  [*]       Vancomycin 1 ug/mL Susceptible                   [*]  Suppressed Antibiotic     [1]  Oxacillin susceptible isolates are susceptible to cephalosporins (example: cefazolin and cephalexin) and beta lactam combination agents. Oxacillin resistant isolates are resistant to these agents.               Susceptibility Comments       Staphylococcus epidermidis    Antibiotics listed as \"No Interpretation\" have no regulatory guidelines for susceptibility/resistance available.               04/14/2024 2259 04/15/2024 2215 Brainparkigene GP Panel [[id number]]    (Abnormal)   Peripheral Blood    Final result Component Value   Staphylococcus aureus Not Detected   Staphylococcus epidermidis Detected   Positive for Staphylococcus epidermidis and negative for the mecA gene (not resistant to methicillin) by [employer] multiplex nucleic acid test. Final identification and antimicrobial susceptibility testing will be verified by standard methods.   Staphylococcus lugdunensis Not Detected   Enterococcus faecalis Not Detected   Enterococcus faecium Not Detected   Streptococcus species Not Detected   Streptococcus agalactiae Not Detected   Streptococcus anginosus group Not Detected   Streptococcus pneumoniae Not Detected   Streptococcus pyogenes Not Detected   Listeria species Not Detected                    Pertinent Radiology   Radiology Results:   US Upper Extremity Non " Vascular Right    Result Date: 4/15/2024  EXAM: US UPPER EXTREMITY NON VASCULAR RIGHT LOCATION: [facility] DATE: 4/15/2024 INDICATION: Hand swelling. Minimal fluid near the third digit MCP right hand on MRI. COMPARISON: MRI 04/15/2024. TECHNIQUE: Routine. FINDINGS: Subcutaneous right hand edema present. Not enough of joint fluid was present along the third digit MCP for aspiration. No aspiration performed.     MR Hand Right w/o Contrast    Result Date: 4/15/2024  EXAM: MR HAND RIGHT W/O CONTRAST LOCATION: [facility] DATE: 4/15/2024 INDICATION: Hand swelling and redness. Concern for abscess. COMPARISON: X-rays 04/14/2024 TECHNIQUE: Unenhanced. FINDINGS: TENDONS: -Extensor tendons: Tendinopathy and likely high-grade tearing extensor carpi ulnaris tendon, not definitively evaluated due to motion. There are likely thin intact tendon fibers. Edema adjacent to the tendon. No abnormal tendon sheath fluid. -Flexor tendons: No tendon tear or tendinopathy. Edema and minimal fluid along the flexor digitorum tendons of the ring finger compatible with tenosynovitis. Edema in the adjacent lumbrical muscles. LIGAMENTS: -No evidence of collateral ligament tear. JOINTS AND BONES: -There is likely mild degenerative arthritis involving multiple joints in the wrist wrist, at the edge of the field-of-view, and minimal radiocarpal joint effusion or synovitis. Minimal fluid or synovitis of the third MCP joint with mild capsular edema. Minimal effusion or synovitis of the PIP joints of the ring and middle fingers. MUSCLES AND SOFT TISSUES: -Poorly defined subcutaneous edema dorsally, extending into the ring finger. No fluid collection.     IMPRESSION: 1.  Subcutaneous edema dorsally could represent cellulitis. No abscess. 2.  Minimal effusion or synovitis of the third MCP joint and mild capsular edema is nonspecific and could be reactive or inflammatory. Early developing septic arthritis is  a possibility. Consider short interval follow-up with contrast enhanced MRI. 3.  Mild tenosynovitis of the flexor digitorum tendons of the ring finger with edema - inflammation in the adjacent lumbrical muscles. 4.  Extensor carpi ulnaris tendinopathy and likely high-grade tearing distally..    XR Hand Right G/E 3 Views    Result Date: 4/14/2024  EXAM: XR HAND RIGHT G/E 3 VIEWS LOCATION: [facility] DATE: 4/14/2024 INDICATION: right hand swelling COMPARISON: None.     IMPRESSION: There is diffuse soft tissue swelling of the dorsum of the hand and wrist, no radiopaque foreign bodies are identified. No acute fracture or malalignment of the bony structures are noted. There is mild degenerative changes of the radiocarpal and ulnocarpal joints with subtle erosive changes of the ulnar styloid process present. Degenerative changes of the PIP and DIP joints of the hand are also noted.    CT Chest Pulmonary Embolism w Contrast    Result Date: 4/14/2024  EXAM: CT CHEST PULMONARY EMBOLISM W CONTRAST LOCATION: [facility] DATE: 4/14/2024 INDICATION: Known right-sided DVT; noncompliance on anticoagulation. COMPARISON: CT chest 03/29/2024. TECHNIQUE: CT chest pulmonary angiogram during arterial phase injection of IV contrast. Multiplanar reformats and MIP reconstructions were performed. Dose reduction techniques were used. CONTRAST: 75 mL Isovue-370. FINDINGS: Patient was imaged with arm(s) at side, limiting study quality. ANGIOGRAM CHEST: No acute pulmonary embolism. Unchanged thin linear filling defect at the bifurcation of the distal left main pulmonary artery, compatible with chronic thrombus, series 4 image 151. Main pulmonary artery is mildly enlarged, measuring up to 3.7 cm, similar to prior. Ascending thoracic aortic aneurysm, measuring 4.2 cm, unchanged. LUNGS AND PLEURA: Trachea and large airways are patent. No acute airspace consolidation. Unchanged linear scarring and  volume loss within the right middle and right upper lobes.  No suspicious pulmonary nodule. No pneumothorax. Mild chronic right pleural thickening. No acute pleural effusion.  MEDIASTINUM/AXILLAE: Unchanged mild mediastinal lymphadenopathy, as compared to 04/01/2022. For reference, a right pretracheal lymph node measures 10 mm in short axis, series 4 image 96. No mediastinal lymphadenopathy. Thoracic esophagus is unremarkable.  Mild right axillary lymphadenopathy, unchanged from 04/01/2022. For reference, a right axillary lymph node measures up to 12 mm in short axis, series 4 image 50. Chest wall is unremarkable. Heart is normal in size. No pericardial effusion. CORONARY ARTERY CALCIFICATION: None. UPPER ABDOMEN: Subtle liver surface nodularity, suggesting underlying cirrhosis. Cholelithiasis. Persistent fetal lobulation of both kidneys. MUSCULOSKELETAL: No suspicious abnormality. OTHER: No additionally suspicious abnormality.     IMPRESSION: 1.  No acute pulmonary embolism. 2.  Chronic weblike thrombus within the distal left main pulmonary artery. 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 Pulmonology follow-up is recommended. 3.  Mild mediastinal and right axillary lymphadenopathy is unchanged from 2022 and likely reactive/benign. No further follow-up is recommended. 4.  Unchanged 4.2 cm ascending thoracic aortic aneurysm. 5.  Hepatic cirrhosis. 6.  Cholelithiasis. [Recommend Follow Up: Pulmonology follow-up.] This report will be copied to the [facility] to ensure a provider acknowledges the finding.     US Upper Extremity Venous Duplex Right    Result Date: 4/14/2024  EXAM: US UPPER EXTREMITY VENOUS DUPLEX RIGHT LOCATION: [facility] DATE: 4/14/2024 INDICATION: Right hand swelling, dx of DVT, not taking anticoagulation COMPARISON:  None. TECHNIQUE: Venous Duplex ultrasound of the right upper extremity with (when possible) and without compression, augmentation, and duplex. Color flow and spectral Doppler with waveform analysis performed. FINDINGS: Ultrasound includes evaluation of the internal jugular vein, innominate vein, subclavian vein, axillary vein, and brachial vein. The superficial cephalic and basilic veins were also evaluated where seen. RIGHT: No deep venous thrombosis. No superficial thrombophlebitis.     IMPRESSION: 1.  No deep venous thrombosis in the right upper extremity.

## 2024-04-17 NOTE — TELEPHONE ENCOUNTER
RECORDS RECEIVED FROM:    DATE RECEIVED:    GENERAL RECORDS STATUS DETAILS   OFFICE NOTE from cardiologists N/A    EKG (STRIPS & REPORTS) Internal 3-29-24   ECHOS (IMAGES AND REPORTS) N/A    PULMONARY HYPERTENSION      6 MINUTE WALK TEST N/A    PULMONARY FUNCTION TESTS N/A    RIGHT HEART CATH (IMAGES) N/A    SLEEP STUDY / OVERNIGHT OXIMETRY N/A    XR CHEST   (IMAGES AND REPORTS) Internal 1-10-23   CHEST CT  (IMAGES AND REPORTS) Internal 4-14-24   V/Q SCAN (IMAGES) N/A    LIVER US  (IMAGES AND REPORTS) N/A    ANGIOGRAMS (IMAGES) N/A    STRESS TEST   (IMAGES AND REPORTS) N/A

## 2024-04-17 NOTE — PROGRESS NOTES
[facility]    Medicine Progress Note - Hospitalist Service    Date of Admission:  4/14/2024    Assessment & Plan      José Luis Bocanegra is a 64 year old female with history of pulmonary embolism, hypertension, GERD, and morbid obesity admitted on 4/14/2024 with painful acute on chronic right hand swelling and erythema.     MRI showed subcutaneous edema dorsally suggestive of cellulitis, possible early developing septic arthritis, mild tenosynovitis of the flexor digitorum tendons of the ring finger with edema - inflammation in the adjacent lumbrical muscles and extensor carpi ulnaris tendinopathy and likely high-grade tearing distally. Blood cultures obtained grew Staphylococcus epidermidis deemed to be a contaminant per ID specialist. Arthrocentesis attempt was unsuccessful due to inadequate synovial fluid accumulation.  No plans for surgical intervention per surgery service.     She received IV antibiotics during hospital stay and was subsequently transitioned to oral cephalexin for 7 days as recommended by infectious disease specialist. Pulmonary CT angiogram was suggestive of CTEPH for which pulmonologist recommends DOAC, and outpatient follow-up at the CTEPH center at the [facility].       Right hand cellulitis  Lactic acidosis  Tenosynovitis  MRI showed subcutaneous edema dorsally suggestive of cellulitis, possible early developing septic arthritis, mild tenosynovitis of the flexor digitorum tendons of the ring finger with edema - inflammation in the adjacent lumbrical muscles and extensor carpi ulnaris tendinopathy and likely high-grade tearing distally. Blood cultures obtained grew Staphylococcus epidermidis deemed to be a contaminant per ID specialist. Arthrocentesis attempt was unsuccessful due to inadequate synovial fluid accumulation.  No plans for surgical intervention per surgery service.     Continue cefazolin with plan to transition to oral antibiotic on 4/17/2024 if symptoms continue  "to improve per ID specialist.  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) for which pulmonology follow-up is recommended, mild mediastinal and right axillary lymphadenopathy is unchanged from 2022 and likely reactive/benign, unchanged 4.2 cm ascending thoracic aortic aneurysm, hepatic cirrhosis and no evidence of acute pulmonary embolism      -Continue xarelto   -Outpatient follow-up CTEPH center at the [facility] as arranged by pulmonologist  -Might require VQ scan and right heart catheterization in the future  -Pulmonologist signed off- 4/15/24    CKD stage 3b  -renal functions at baseline  -monitor    Chronic gout  -continue allopurinol    Hyperlipidemia  -continue lipitor    Type 2 diabetes mellitus not on long term insulin  -continue glipizide  -monitor glucose ac and hs  -sliding scale insulin  -hypoglycemic protocol    Essential hypertension  -continue lisinopril    Hepatic cirrhosis  -seen on ct  -monitor    4.2 cm ascending thoracic aortic aneurysm  -stable  -outpatient follow up      Diet: Regular Diet Adult    DVT Prophylaxis: Pneumatic Compression Devices  Hilliard Catheter: Not present  Lines: None     Cardiac Monitoring: None  Code Status: Full Code      Clinically Significant Risk Factors                  # Hypertension: Noted on problem list       # DMII: A1C = 9.2 % (Ref range: <5.7 %) within past 6 months, PRESENT ON ADMISSION  # Obesity: Estimated body mass index is 38.38 kg/m  as calculated from the following:    Height as of this encounter: 1.499 m (4' 11\").    " Weight as of this encounter: 86.2 kg (190 lb)., PRESENT ON ADMISSION            Disposition Plan     Medically Ready for Discharge: 1 day    Emilie Perry MD  Hospitalist Service  [facility]  Securely message with [employer] (more info)  Text page via [employer] Paging/Directory   ______________________________________________________________________    Interval History   No new complaints today and no acute events overnight.  Hand swelling and pain have improved.  No plans for surgical intervention per surgery service. Daughter-in-law at bedside-assisted with translation.     Physical Exam   Vital Signs: Temp: 98.4  F (36.9  C) Temp src: Oral BP: (!) 141/90 Pulse: 83   Resp: 18 SpO2: 91 % O2 Device: None (Room air)    Weight: 190 lbs 0 oz    General appearance: Awake, Alert, Cooperative, not in any obvious distress and appears stated age   HEENT: Normocephalic, atraumatic, conjunctiva clear without icterus and ears without discharge  Lungs: Clear to auscultation bilaterally, no wheezing, good air exchange, normal work of breathing  Cardiovascular: Regular Rate and Rythm, normal apical impulse, normal S1 and S2, no lower extremity edema bilaterally  Abdomen: Soft, non-tender and Non-distended, active bowel sounds  Skin: Skin color, texture normal and bruising or bleeding. No rashes or lesions over face, neck, arms and legs, turgor normal.  Musculoskeletal: Swollen-worse in the dorsal aspect and tender  Neurologic: Alert & Oriented X 3, Facial symmetry preserved and upper & lower extremities moving well with symmetry  Psychiatric: Calm, normal eye contact and normal affect      Medical Decision Making       40 MINUTES SPENT BY ME on the date of service doing chart review, history, exam, documentation & further activities per the note.      Data         Imaging results reviewed over the past 24 hrs:   No results found for this or any previous visit (from the past 24 hour(s)).

## 2024-04-18 VITALS
DIASTOLIC BLOOD PRESSURE: 85 MMHG | HEART RATE: 76 BPM | RESPIRATION RATE: 18 BRPM | SYSTOLIC BLOOD PRESSURE: 142 MMHG | HEIGHT: 59 IN | OXYGEN SATURATION: 92 % | TEMPERATURE: 98.3 F | WEIGHT: 190 LBS | BODY MASS INDEX: 38.3 KG/M2

## 2024-04-18 LAB
GLUCOSE BLDC GLUCOMTR-MCNC: 112 MG/DL (ref 70–99)
GLUCOSE BLDC GLUCOMTR-MCNC: 115 MG/DL (ref 70–99)

## 2024-04-18 PROCEDURE — 250N000013 HC RX MED GY IP 250 OP 250 PS 637: Performed by: HOSPITALIST

## 2024-04-18 PROCEDURE — 99231 SBSQ HOSP IP/OBS SF/LOW 25: CPT

## 2024-04-18 PROCEDURE — 250N000013 HC RX MED GY IP 250 OP 250 PS 637

## 2024-04-18 PROCEDURE — 99239 HOSP IP/OBS DSCHRG MGMT >30: CPT | Performed by: INTERNAL MEDICINE

## 2024-04-18 RX ORDER — CEPHALEXIN 500 MG/1
500 CAPSULE ORAL 4 TIMES DAILY
Qty: 24 CAPSULE | Refills: 0 | Status: SHIPPED | OUTPATIENT
Start: 2024-04-18 | End: 2024-04-24

## 2024-04-18 RX ADMIN — ACETAMINOPHEN 650 MG: 325 TABLET ORAL at 09:52

## 2024-04-18 RX ADMIN — ALLOPURINOL 100 MG: 100 TABLET ORAL at 09:46

## 2024-04-18 RX ADMIN — CEPHALEXIN 500 MG: 500 CAPSULE ORAL at 06:30

## 2024-04-18 RX ADMIN — LISINOPRIL 20 MG: 20 TABLET ORAL at 09:46

## 2024-04-18 RX ADMIN — ATORVASTATIN CALCIUM 20 MG: 10 TABLET, FILM COATED ORAL at 09:46

## 2024-04-18 RX ADMIN — RIVAROXABAN 15 MG: 15 TABLET, FILM COATED ORAL at 09:46

## 2024-04-18 RX ADMIN — GLIPIZIDE 10 MG: 10 TABLET, FILM COATED, EXTENDED RELEASE ORAL at 09:46

## 2024-04-18 RX ADMIN — CEPHALEXIN 500 MG: 500 CAPSULE ORAL at 11:52

## 2024-04-18 ASSESSMENT — ACTIVITIES OF DAILY LIVING (ADL)
ADLS_ACUITY_SCORE: 24

## 2024-04-18 NOTE — PROGRESS NOTES
Infectious Disease Progress Note    Assessment/Plan  Impression: Acute on chronic right hand pain, swelling, erythema.  MRI nonspecific for infection although some tenosynovitis is described.  Doing better today with less pain, swelling, erythema.      Right lower extremity DVT and pulmonary emboli.     Unclear to me how much of the chronic swelling might be related to chronic pulmonary emboli.     Patient also has history of gout and uncontrolled diabetes mellitus.    Single blood cultures with GPC, genotypically S epi.  Suspect contaminant.     Recommendations: change antibiotics to cephalexin 500 mg po qid x 7 days     Continue keeping right hand elevated.    OK to discharge to home anytime from Infectious Disease standpoint.    We'll sign off.  Please call if questions or problems.        Active Problems:    Lactic acidosis    Cellulitis    Noncompliance with medication regimen    Swelling of right hand    Chronic pulmonary embolism without acute cor pulmonale, unspecified pulmonary embolism type (H)      JUVENTINO BURNETT MD  [phone]      Subjective  Says she is better.  Less pain, swelling and erythema in R hand. No stomach issues. Says she is having trouble sleeping, wondering if it is the meds or the bed.    Objective    Vital signs in last 24 hours  Temp:  [98.2  F (36.8  C)-99  F (37.2  C)] 98.3  F (36.8  C)  Pulse:  [76-86] 76  Resp:  [18-20] 18  BP: (138-179)/(85-98) 142/85  SpO2:  [92 %-93 %] 92 %  Wt Readings from Last 3 Encounters:   04/14/24 86.2 kg (190 lb)   03/29/24 90.3 kg (199 lb)   01/10/23 (!) 191 kg (421 lb 1.3 oz)           Intake/Output last 3 shifts  I/O last 3 completed shifts:  In: 480 [P.O.:480]  Out: -   Intake/Output this shift:  No intake/output data recorded.    Review of Systems   Pertinent items are noted in HPI., otherwise negative.    Physical Exam  General appearance: alert, appears stated age, and cooperative  Head: Normocephalic, without obvious abnormality,  "atraumatic  Eyes: Extraocular muscles intact, no icterus  Throat: lips, mucosa, and tongue normal; teeth and gums normal  Neck: no adenopathy and supple, symmetrical, trachea midline  Lungs: clear to auscultation bilaterally  Heart: Regular rate and rhythm, no murmur  Abdomen: soft, non-tender; bowel sounds normal; no masses,  no organomegaly  Extremities: She has continued decreased edema in the right hand and decreased  erythema over the dorsum of the hand.  No  Prominent veins in the right forearm.  No fluctuance is appreciated.  Skin: Skin color, texture, turgor normal. No rashes or lesions  Lymph nodes: Cervical, supraclavicular, and axillary nodes normal.  Neurologic: Grossly normal    Pertinent Labs   Lab Results: personally reviewed.     Recent Labs   Lab 04/16/24  0851 04/15/24  0816 04/14/24 1936   WBC 8.6 10.7 10.2   HGB 11.7 11.8 13.0   HCT 38.0 36.9 41.4    241 245        Recent Labs   Lab 04/16/24  0851 04/15/24  0816 04/14/24 1936    139 140   CO2 18* 22 23   BUN 10.4 12.5 14.6     Creatinine   Date Value Ref Range Status   04/16/2024 1.14 (H) 0.51 - 0.95 mg/dL Final       No results found for: \"CULT\"  7-Day Micro Results       Collected Updated Procedure Result Status      04/15/2024 2055 04/17/2024 2331 Blood Culture Arm, Left [[id number]]   Blood from Arm, Left    Preliminary result Component Value   Culture No growth after 2 days  [P]                04/15/2024 2054 04/17/2024 2331 Blood Culture Arm, Right [[id number]]   Blood from Arm, Right    Preliminary result Component Value   Culture No growth after 2 days  [P]                04/14/2024 2303 04/18/2024 0016 Blood Culture Peripheral Blood [[id number]]   Peripheral Blood    Preliminary result Component Value   Culture No growth after 3 days  [P]                04/14/2024 2259 04/17/2024 0757 Blood Culture Peripheral Blood [[id number]]    (Abnormal)   Peripheral Blood    Final result Component Value   Culture Positive on " "the 1st day of incubation    Staphylococcus epidermidis    1 of 2 bottles        Susceptibility        Staphylococcus epidermidis      BALA      Ciprofloxacin <=0.5 ug/mL Susceptible      Clindamycin 0.5 ug/mL Susceptible      Daptomycin 0.25 ug/mL Susceptible      Doxycycline <=0.5 ug/mL Susceptible      Erythromycin <=0.25 ug/mL Susceptible      Gentamicin >=16 ug/mL Resistant      Inducible macrolide resistance test Negative ug/mL Negative  [*]       Levofloxacin <=0.12 ug/mL Susceptible      Linezolid 1 ug/mL Susceptible  [*]       Moxifloxacin <=0.25 ug/mL Susceptible  [*]       Nitrofurantoin <=16 ug/mL Susceptible  [*]       Oxacillin <=0.25 ug/mL Susceptible  [1]       Rifampin <=0.5 ug/mL Susceptible  [*]       Tetracycline <=1 ug/mL Susceptible      Tigecycline <=0.12 ug/mL No interpretation available  [*]       Vancomycin 1 ug/mL Susceptible                   [*]  Suppressed Antibiotic     [1]  Oxacillin susceptible isolates are susceptible to cephalosporins (example: cefazolin and cephalexin) and beta lactam combination agents. Oxacillin resistant isolates are resistant to these agents.               Susceptibility Comments       Staphylococcus epidermidis    Antibiotics listed as \"No Interpretation\" have no regulatory guidelines for susceptibility/resistance available.               04/14/2024 2259 04/15/2024 2215 Hiperosigene GP Panel [[id number]]    (Abnormal)   Peripheral Blood    Final result Component Value   Staphylococcus aureus Not Detected   Staphylococcus epidermidis Detected   Positive for Staphylococcus epidermidis and negative for the mecA gene (not resistant to methicillin) by [facility] multiplex nucleic acid test. Final identification and antimicrobial susceptibility testing will be verified by standard methods.   Staphylococcus lugdunensis Not Detected   Enterococcus faecalis Not Detected   Enterococcus faecium Not Detected   Streptococcus species Not Detected   Streptococcus agalactiae Not " Detected   Streptococcus anginosus group Not Detected   Streptococcus pneumoniae Not Detected   Streptococcus pyogenes Not Detected   Listeria species Not Detected                    Pertinent Radiology   Radiology Results:   US Upper Extremity Non Vascular Right    Result Date: 4/15/2024  EXAM: US UPPER EXTREMITY NON VASCULAR RIGHT LOCATION: [facility] DATE: 4/15/2024 INDICATION: Hand swelling. Minimal fluid near the third digit MCP right hand on MRI. COMPARISON: MRI 04/15/2024. TECHNIQUE: Routine. FINDINGS: Subcutaneous right hand edema present. Not enough of joint fluid was present along the third digit MCP for aspiration. No aspiration performed.     MR Hand Right w/o Contrast    Result Date: 4/15/2024  EXAM: MR HAND RIGHT W/O CONTRAST LOCATION: [facility] DATE: 4/15/2024 INDICATION: Hand swelling and redness. Concern for abscess. COMPARISON: X-rays 04/14/2024 TECHNIQUE: Unenhanced. FINDINGS: TENDONS: -Extensor tendons: Tendinopathy and likely high-grade tearing extensor carpi ulnaris tendon, not definitively evaluated due to motion. There are likely thin intact tendon fibers. Edema adjacent to the tendon. No abnormal tendon sheath fluid. -Flexor tendons: No tendon tear or tendinopathy. Edema and minimal fluid along the flexor digitorum tendons of the ring finger compatible with tenosynovitis. Edema in the adjacent lumbrical muscles. LIGAMENTS: -No evidence of collateral ligament tear. JOINTS AND BONES: -There is likely mild degenerative arthritis involving multiple joints in the wrist wrist, at the edge of the field-of-view, and minimal radiocarpal joint effusion or synovitis. Minimal fluid or synovitis of the third MCP joint with mild capsular edema. Minimal effusion or synovitis of the PIP joints of the ring and middle fingers. MUSCLES AND SOFT TISSUES: -Poorly defined subcutaneous edema dorsally, extending into the ring finger. No fluid collection.      IMPRESSION: 1.  Subcutaneous edema dorsally could represent cellulitis. No abscess. 2.  Minimal effusion or synovitis of the third MCP joint and mild capsular edema is nonspecific and could be reactive or inflammatory. Early developing septic arthritis is a possibility. Consider short interval follow-up with contrast enhanced MRI. 3.  Mild tenosynovitis of the flexor digitorum tendons of the ring finger with edema - inflammation in the adjacent lumbrical muscles. 4.  Extensor carpi ulnaris tendinopathy and likely high-grade tearing distally..    XR Hand Right G/E 3 Views    Result Date: 4/14/2024  EXAM: XR HAND RIGHT G/E 3 VIEWS LOCATION: [facility] DATE: 4/14/2024 INDICATION: right hand swelling COMPARISON: None.     IMPRESSION: There is diffuse soft tissue swelling of the dorsum of the hand and wrist, no radiopaque foreign bodies are identified. No acute fracture or malalignment of the bony structures are noted. There is mild degenerative changes of the radiocarpal and ulnocarpal joints with subtle erosive changes of the ulnar styloid process present. Degenerative changes of the PIP and DIP joints of the hand are also noted.    CT Chest Pulmonary Embolism w Contrast    Result Date: 4/14/2024  EXAM: CT CHEST PULMONARY EMBOLISM W CONTRAST LOCATION: [facility] DATE: 4/14/2024 INDICATION: Known right-sided DVT; noncompliance on anticoagulation. COMPARISON: CT chest 03/29/2024. TECHNIQUE: CT chest pulmonary angiogram during arterial phase injection of IV contrast. Multiplanar reformats and MIP reconstructions were performed. Dose reduction techniques were used. CONTRAST: 75 mL Isovue-370. FINDINGS: Patient was imaged with arm(s) at side, limiting study quality. ANGIOGRAM CHEST: No acute pulmonary embolism. Unchanged thin linear filling defect at the bifurcation of the distal left main pulmonary artery, compatible with chronic thrombus, series 4 image 151. Main  pulmonary artery is mildly enlarged, measuring up to 3.7 cm, similar to prior. Ascending thoracic aortic aneurysm, measuring 4.2 cm, unchanged. LUNGS AND PLEURA: Trachea and large airways are patent. No acute airspace consolidation. Unchanged linear scarring and volume loss within the right middle and right upper lobes.  No suspicious pulmonary nodule. No pneumothorax. Mild chronic right pleural thickening. No acute pleural effusion.  MEDIASTINUM/AXILLAE: Unchanged mild mediastinal lymphadenopathy, as compared to 04/01/2022. For reference, a right pretracheal lymph node measures 10 mm in short axis, series 4 image 96. No mediastinal lymphadenopathy. Thoracic esophagus is unremarkable.  Mild right axillary lymphadenopathy, unchanged from 04/01/2022. For reference, a right axillary lymph node measures up to 12 mm in short axis, series 4 image 50. Chest wall is unremarkable. Heart is normal in size. No pericardial effusion. CORONARY ARTERY CALCIFICATION: None. UPPER ABDOMEN: Subtle liver surface nodularity, suggesting underlying cirrhosis. Cholelithiasis. Persistent fetal lobulation of both kidneys. MUSCULOSKELETAL: No suspicious abnormality. OTHER: No additionally suspicious abnormality.     IMPRESSION: 1.  No acute pulmonary embolism. 2.  Chronic weblike thrombus within the distal left main pulmonary artery. 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 Pulmonology follow-up is recommended. 3.  Mild mediastinal and right axillary lymphadenopathy is unchanged from 2022 and likely reactive/benign. No further follow-up is recommended. 4.  Unchanged 4.2 cm ascending thoracic aortic aneurysm. 5.  Hepatic cirrhosis. 6.  Cholelithiasis. [Recommend Follow Up: Pulmonology follow-up.] This report will be copied to the [facility] to ensure a provider acknowledges the  finding.     US Upper Extremity Venous Duplex Right    Result Date: 4/14/2024  EXAM: US UPPER EXTREMITY VENOUS DUPLEX RIGHT LOCATION: [facility] DATE: 4/14/2024 INDICATION: Right hand swelling, dx of DVT, not taking anticoagulation COMPARISON: None. TECHNIQUE: Venous Duplex ultrasound of the right upper extremity with (when possible) and without compression, augmentation, and duplex. Color flow and spectral Doppler with waveform analysis performed. FINDINGS: Ultrasound includes evaluation of the internal jugular vein, innominate vein, subclavian vein, axillary vein, and brachial vein. The superficial cephalic and basilic veins were also evaluated where seen. RIGHT: No deep venous thrombosis. No superficial thrombophlebitis.     IMPRESSION: 1.  No deep venous thrombosis in the right upper extremity.

## 2024-04-18 NOTE — PLAN OF CARE
Problem: Comorbidity Management  Goal: Blood Glucose Levels Within Targeted Range  Outcome: Progressing  Intervention: Monitor and Manage Glycemia  Recent Flowsheet Documentation  Taken 4/17/2024 2100 by Jon Michel, RN  Medication Review/Management: medications reviewed     Problem: Skin or Soft Tissue Infection  Goal: Absence of Infection Signs and Symptoms  Outcome: Progressing  Intervention: Minimize and Manage Infection Progression  Recent Flowsheet Documentation  Taken 4/17/2024 2100 by Jon Michel, RN  Infection Prevention: hand hygiene promoted   Goal Outcome Evaluation:       Pt alert and oriented x4. Independent in room. Hmong speaking, daughter at bedside and helps with the translation. HS  Sliding insulin coverage given. No c/o of pain. VSS on room air.

## 2024-04-18 NOTE — PROGRESS NOTES
Care Management Discharge Note    Discharge Date: 04/18/2024       Discharge Disposition: Home    Discharge Services: None    Discharge DME: None    Discharge Transportation: family or friend will provide    Private pay costs discussed: Not applicable    Does the patient's insurance plan have a 3 day qualifying hospital stay waiver?  No    PAS Confirmation Code:    Patient/family educated on Medicare website which has current facility and service quality ratings:      Education Provided on the Discharge Plan: Yes  Persons Notified of Discharge Plans: Patient - Per team   Patient/Family in Agreement with the Plan: yes    Handoff Referral Completed: Yes    Additional Information:  Patient to discharge home, family transport.     Erin Shah RN

## 2024-04-18 NOTE — PLAN OF CARE
Problem: Comorbidity Management  Goal: Blood Glucose Levels Within Targeted Range  Intervention: Monitor and Manage Glycemia  Recent Flowsheet Documentation  Taken 4/17/2024 2340 by Paula Varghese RN  Medication Review/Management: medications reviewed     Problem: Skin or Soft Tissue Infection  Goal: Absence of Infection Signs and Symptoms  Intervention: Minimize and Manage Infection Progression  Recent Flowsheet Documentation  Taken 4/17/2024 2340 by Paula Varghese, RN  Infection Prevention:   hand hygiene promoted   rest/sleep promoted   single patient room provided   Goal Outcome Evaluation:       Patient is A/O x4. Denied pain and nausea this shift. Blood pressure elevated at beginning of shift but improved throughout the shift all other vitals stable on room air. Blood sugar this shift was 115. Hmong speaking, daughter at bedside to translate. Patient states edema seems to be improving in hands. On oral antibiotics. Independent in room. No acute events this shift. Call light within reach.

## 2024-04-18 NOTE — DISCHARGE SUMMARY
"[facility]  Hospitalist Discharge Summary      Date of Admission:  4/14/2024  Date of Discharge:  4/18/2024  Discharging Provider: Emilie Perry MD  Discharge Service: Hospitalist Service    Discharge Diagnoses   Right hand cellulitis  Lactic acidosis  Mild tenosynovitis of the flexor digitorum tendons of the ring finger    Clinically Significant Risk Factors     # DMII: A1C = 9.2 % (Ref range: <5.7 %) within past 6 months  # Obesity: Estimated body mass index is 38.38 kg/m  as calculated from the following:    Height as of this encounter: 1.499 m (4' 11\").    Weight as of this encounter: 86.2 kg (190 lb).       Follow-ups Needed After Discharge   Follow-up Appointments     Follow Up Care      Please follow-up with hand surgery team at [facility] in 1 week   if unable to follow up with PCP by that time. Call our scheduling line at   [phone] to make an appointment, if you do not already have one   scheduled.        Follow-up and recommended labs and tests       Follow up with primary care provider, Bonnie Crain, within 7 days for   hospital follow- up.      Continue cephalexin for the next 6 days.  Continue rivaroxaban 15 mg twice   daily for the next 18 days and then switch to 20 mg daily afterwards.             Unresulted Labs Ordered in the Past 30 Days of this Admission       Date and Time Order Name Status Description    4/15/2024  8:36 PM Blood Culture Arm, Left Preliminary     4/15/2024  8:36 PM Blood Culture Arm, Right Preliminary     4/14/2024 10:32 PM Blood Culture Peripheral Blood Preliminary         These results will be followed up by Bonnie Crain      Discharge Disposition   Discharged to home  Condition at discharge: Stable    Hospital Course   José Luis Bocanegra is a 64 year old female with history of pulmonary embolism, hypertension, GERD, and morbid obesity admitted on 4/14/2024 with painful acute on chronic right hand swelling and erythema.     MRI showed " subcutaneous edema dorsally suggestive of cellulitis, possible early developing septic arthritis, mild tenosynovitis of the flexor digitorum tendons of the ring finger with edema - inflammation in the adjacent lumbrical muscles and extensor carpi ulnaris tendinopathy and likely high-grade tearing distally. Blood culture grew Staphylococcus epidermidis deemed to be a contaminant per ID specialist. Arthrocentesis attempt was unsuccessful due to inadequate synovial fluid accumulation.  No indication for surgical intervention per surgery service.     She received IV antibiotics during hospital stay and was subsequently transitioned to oral cephalexin for 7 days as recommended by infectious disease specialist. 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.     Symptoms have improved and patient is clinically stable for discharge at this time.    Consultations This Hospital Stay   PHARMACY TO DOSE VANCO  INFECTIOUS DISEASES IP CONSULT  PULMONARY IP CONSULT    Code Status   Full Code    Time Spent on this Encounter   I, Emilie Perry MD, personally saw the patient today and spent greater than 30 minutes discharging this patient.       Emilie Perry MD  [address] [address]  Phone: [phone]  Fax: [phone]  ______________________________________________________________________    Physical Exam   Vital Signs: Temp: 98.3  F (36.8  C) Temp src: Oral BP: (!) 142/85 Pulse: 76   Resp: 18 SpO2: 92 % O2 Device: None (Room air)    Weight: 190 lbs 0 oz    General appearance: Awake, Alert, Cooperative, not in any obvious distress and appears stated age   HEENT: Normocephalic, atraumatic, conjunctiva clear without icterus and ears without discharge  Lungs: Clear to auscultation bilaterally, no wheezing, good air exchange, normal work of  breathing  Cardiovascular: Regular Rate and Rythm, normal apical impulse, normal S1 and S2, no lower extremity edema bilaterally  Abdomen: Soft, non-tender and Non-distended, active bowel sounds  Skin: Skin color, texture normal and bruising or bleeding. No rashes or lesions over face, neck, arms and legs, turgor normal.  Musculoskeletal: Swollen-worse in the dorsal aspect and tender- improved   Neurologic: Alert & Oriented X 3, Facial symmetry preserved and upper & lower extremities moving well with symmetry  Psychiatric: Calm, normal eye contact and normal affect          Primary Care Physician   Bonnie Crain    Discharge Orders      Follow Up Care    Please follow-up with hand surgery team at [facility] in 1 week if unable to follow up with PCP by that time. Call our scheduling line at [phone] to make an appointment, if you do not already have one scheduled.     Reason for your hospital stay    Right hand cellulitis  Lactic acidosis  Mild tenosynovitis of the flexor digitorum tendons of the ring finger     Follow-up and recommended labs and tests     Follow up with primary care provider, Bonnie Crain, within 7 days for hospital follow- up.      Continue cephalexin for the next 6 days.  Continue rivaroxaban 15 mg twice daily for the next 18 days and then switch to 20 mg daily afterwards.     Activity    Your activity upon discharge: activity as tolerated     Diet    Follow this diet upon discharge: Orders Placed This Encounter      Regular Diet Adult       Significant Results and Procedures   Results for orders placed or performed during the hospital encounter of 04/14/24   US Upper Extremity Venous Duplex Right    Narrative    EXAM: US UPPER EXTREMITY VENOUS DUPLEX RIGHT  LOCATION: [facility]  DATE: 4/14/2024    INDICATION: Right hand swelling, dx of DVT, not taking anticoagulation  COMPARISON: None.  TECHNIQUE: Venous Duplex ultrasound of the right upper extremity with  (when possible) and without compression, augmentation, and duplex. Color flow and spectral Doppler with waveform analysis performed.    FINDINGS: Ultrasound includes evaluation of the internal jugular vein, innominate vein, subclavian vein, axillary vein, and brachial vein. The superficial cephalic and basilic veins were also evaluated where seen.     RIGHT: No deep venous thrombosis. No superficial thrombophlebitis.      Impression    IMPRESSION:  1.  No deep venous thrombosis in the right upper extremity.   CT Chest Pulmonary Embolism w Contrast     Value    Radiologist flags Pulmonology follow-up.    Narrative    EXAM: CT CHEST PULMONARY EMBOLISM W CONTRAST  LOCATION: [facility]  DATE: 4/14/2024    INDICATION: Known right-sided DVT; noncompliance on anticoagulation.  COMPARISON: CT chest 03/29/2024.  TECHNIQUE: CT chest pulmonary angiogram during arterial phase injection of IV contrast. Multiplanar reformats and MIP reconstructions were performed. Dose reduction techniques were used.   CONTRAST: 75 mL Isovue-370.    FINDINGS: Patient was imaged with arm(s) at side, limiting study quality.    ANGIOGRAM CHEST: No acute pulmonary embolism. Unchanged thin linear filling defect at the bifurcation of the distal left main pulmonary artery, compatible with chronic thrombus, series 4 image 151.    Main pulmonary artery is mildly enlarged, measuring up to 3.7 cm, similar to prior.    Ascending thoracic aortic aneurysm, measuring 4.2 cm, unchanged.    LUNGS AND PLEURA: Trachea and large airways are patent. No acute airspace consolidation. Unchanged linear scarring and volume loss within the right middle and right upper lobes.     No suspicious pulmonary nodule.    No pneumothorax.     Mild chronic right pleural thickening. No acute pleural effusion.     MEDIASTINUM/AXILLAE: Unchanged mild mediastinal lymphadenopathy, as compared to 04/01/2022. For reference, a right pretracheal lymph node measures  10 mm in short axis, series 4 image 96. No mediastinal lymphadenopathy.     Thoracic esophagus is unremarkable.      Mild right axillary lymphadenopathy, unchanged from 04/01/2022. For reference, a right axillary lymph node measures up to 12 mm in short axis, series 4 image 50.    Chest wall is unremarkable.    Heart is normal in size. No pericardial effusion.    CORONARY ARTERY CALCIFICATION: None.    UPPER ABDOMEN: Subtle liver surface nodularity, suggesting underlying cirrhosis. Cholelithiasis. Persistent fetal lobulation of both kidneys.    MUSCULOSKELETAL: No suspicious abnormality.    OTHER: No additionally suspicious abnormality.      Impression    IMPRESSION:  1.  No acute pulmonary embolism.  2.  Chronic weblike thrombus within the distal left main pulmonary artery. 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 Pulmonology follow-up is recommended.  3.  Mild mediastinal and right axillary lymphadenopathy is unchanged from 2022 and likely reactive/benign. No further follow-up is recommended.  4.  Unchanged 4.2 cm ascending thoracic aortic aneurysm.  5.  Hepatic cirrhosis.  6.  Cholelithiasis.      [Recommend Follow Up: Pulmonology follow-up.]    This report will be copied to the [facility] to ensure a provider acknowledges the finding.      XR Hand Right G/E 3 Views    Narrative    EXAM: XR HAND RIGHT G/E 3 VIEWS  LOCATION: [facility]  DATE: 4/14/2024    INDICATION: right hand swelling  COMPARISON: None.      Impression    IMPRESSION: There is diffuse soft tissue swelling of the dorsum of the hand and wrist, no radiopaque foreign bodies are identified. No acute fracture or malalignment of the bony structures are noted. There is mild degenerative changes of the radiocarpal   and ulnocarpal joints with subtle erosive changes of the  ulnar styloid process present. Degenerative changes of the PIP and DIP joints of the hand are also noted.   MR Hand Right w/o Contrast    Narrative    EXAM: MR HAND RIGHT W/O CONTRAST  LOCATION: [facility]  DATE: 4/15/2024    INDICATION: Hand swelling and redness. Concern for abscess.  COMPARISON: X-rays 04/14/2024  TECHNIQUE: Unenhanced.    FINDINGS:     TENDONS:   -Extensor tendons: Tendinopathy and likely high-grade tearing extensor carpi ulnaris tendon, not definitively evaluated due to motion. There are likely thin intact tendon fibers. Edema adjacent to the tendon. No abnormal tendon sheath fluid.  -Flexor tendons: No tendon tear or tendinopathy. Edema and minimal fluid along the flexor digitorum tendons of the ring finger compatible with tenosynovitis. Edema in the adjacent lumbrical muscles.    LIGAMENTS:  -No evidence of collateral ligament tear.    JOINTS AND BONES:   -There is likely mild degenerative arthritis involving multiple joints in the wrist wrist, at the edge of the field-of-view, and minimal radiocarpal joint effusion or synovitis. Minimal fluid or synovitis of the third MCP joint with mild capsular edema.   Minimal effusion or synovitis of the PIP joints of the ring and middle fingers.    MUSCLES AND SOFT TISSUES:   -Poorly defined subcutaneous edema dorsally, extending into the ring finger. No fluid collection.      Impression    IMPRESSION:  1.  Subcutaneous edema dorsally could represent cellulitis. No abscess.  2.  Minimal effusion or synovitis of the third MCP joint and mild capsular edema is nonspecific and could be reactive or inflammatory. Early developing septic arthritis is a possibility. Consider short interval follow-up with contrast enhanced MRI.  3.  Mild tenosynovitis of the flexor digitorum tendons of the ring finger with edema - inflammation in the adjacent lumbrical muscles.  4.  Extensor carpi ulnaris tendinopathy and likely high-grade tearing  distally..   US Upper Extremity Non Vascular Right    Narrative    EXAM: US UPPER EXTREMITY NON VASCULAR RIGHT  LOCATION: [facility]  DATE: 4/15/2024    INDICATION: Hand swelling. Minimal fluid near the third digit MCP right hand on MRI.  COMPARISON: MRI 04/15/2024.  TECHNIQUE: Routine.    FINDINGS: Subcutaneous right hand edema present. Not enough of joint fluid was present along the third digit MCP for aspiration. No aspiration performed.         Discharge Medications   Current Discharge Medication List        START taking these medications    Details   cephALEXin (KEFLEX) 500 MG capsule Take 1 capsule (500 mg) by mouth 4 times daily for 6 days  Qty: 24 capsule, Refills: 0    Associated Diagnoses: Noncompliance with medication regimen; Lactic acidosis; Morbid obesity (H); Gastroesophageal reflux disease with esophagitis, unspecified whether hemorrhage; Benign essential hypertension           CONTINUE these medications which have CHANGED    Details   Rivaroxaban ANTICOAGULANT 15 & 20 MG TBPK Starter Therapy Pack Take 15 mg by mouth 2 times daily (with meals) for 18 days, THEN 20 mg daily with food for 12 days.  Qty: 51 each, Refills: 0    Associated Diagnoses: Noncompliance with medication regimen; Lactic acidosis; Morbid obesity (H); Gastroesophageal reflux disease with esophagitis, unspecified whether hemorrhage; Benign essential hypertension           CONTINUE these medications which have NOT CHANGED    Details   acetaminophen (TYLENOL) 325 MG tablet Take 325-650 mg by mouth every 6 hours as needed for mild pain      allopurinol (ZYLOPRIM) 100 MG tablet Take 100 mg by mouth 2 times daily      atorvastatin (LIPITOR) 20 MG tablet Take 20 mg by mouth daily      diclofenac (VOLTAREN) 1 % topical gel Apply 2 g topically 3 times daily as needed for moderate pain APPLY 2 GRAMS TO HAND AND FEET THREE TIMES A DAY AS NEEDED FOR PAIN.//PLEEV 2 GRAMS 3 ZAUG TXHUA HNUB YOG MOB      glipiZIDE  (GLUCOTROL XL) 10 MG 24 hr tablet Take 10 mg by mouth daily      lisinopril (ZESTRIL) 20 MG tablet Take 20 mg by mouth daily TAKE 1 TABLET DAILY BY MOUTH FOR HIGH BLOOD PRESSURE // IB HNUB NOJ 1 NOAH YUNG NTSHAV SIAB           Allergies   No Known Allergies

## 2024-04-18 NOTE — PLAN OF CARE
Goal Outcome Evaluation:               Problem: Adult Inpatient Plan of Care  Goal: Optimal Comfort and Wellbeing  Outcome: Met  Intervention: Monitor Pain and Promote Comfort  Recent Flowsheet Documentation  Taken 4/18/2024 0951 by Joy Muhammad  Pain Management Interventions: medication (see MAR)        Patient states she has a headache, rates pain a 5, Tylenol given and pain decreased to a 2. Daughter at bedside. Patients daughter and patient were given discharge instructions.

## 2024-04-18 NOTE — PROGRESS NOTES
"Orthopedic Progress Note      Assessment:  64-year-old female with right dorsal hand erythema and swelling likely cellulitis versus gout.  Unable to have aspiration performed due to insufficient fluid in the MCP joint.  Clinically dorsal hand swelling and erythema is improving today.        Plan:   - No plan for surgical intervention today.  Okay to eat from our perspective   -Continue antibiotic treatment per infectious disease team.    -continue hand elevation and icing  -Pain control per primary team   -She should follow-up with her primary care provider to check in in about 5 to 7 days, see hand surgery if needed  - Okay to discharge from an orthopedic standpoint.        Subjective:  Patient feels like swelling and pain in the hand are improving today.  Denies fever/chills.  No acute events overnight.    All questions/concerns answered today.      Objective:  BP (!) 142/85 (BP Location: Left arm)   Pulse 76   Temp 98.3  F (36.8  C) (Oral)   Resp 18   Ht 1.499 m (4' 11\")   Wt 86.2 kg (190 lb)   SpO2 92%   BMI 38.38 kg/m      The patient is A&Ox3. Appears comfortable, lying in bed dorsum of the hand is swollen and erythematous, This is greatly improving compared to yesterdays exam.  She is unable to make a tight fist but flexion and extension grossly intact in all joints of the hand/fingers.  No pain with flexion/extension of the wrist.  She does have pain with passive extension of the ring finger and is mildly tender to palpation over the volar aspect of this finger, this pain is lesser than yesterday.    Sensation is intact in all fingers distally      No drain     Pertinent Labs   Lab Results: personally reviewed.   No results found for: \"INR\", \"PROTIME\"  Lab Results   Component Value Date    WBC 8.6 04/16/2024    HGB 11.7 04/16/2024    HCT 38.0 04/16/2024    MCV 91 04/16/2024     04/16/2024     Lab Results   Component Value Date     04/16/2024    CO2 18 (L) 04/16/2024         Report " completed by:  GALE HUERTA PA-C  Date: 04/18/2024    [facility]

## 2024-04-20 LAB
BACTERIA BLD CULT: NO GROWTH

## 2024-04-21 ENCOUNTER — PATIENT OUTREACH (OUTPATIENT)
Dept: CARE COORDINATION | Facility: CLINIC | Age: 64
End: 2024-04-21
Payer: COMMERCIAL

## 2024-04-21 ENCOUNTER — APPOINTMENT (OUTPATIENT)
Dept: INTERPRETER SERVICES | Facility: CLINIC | Age: 64
End: 2024-04-21
Payer: COMMERCIAL

## 2024-04-21 NOTE — PROGRESS NOTES
Clinic Care Coordination Contact  [facility]: Post-Discharge Note  SITUATION                                                      Admission:    Admission Date: 04/14/24   Reason for Admission: Lactic acidosis    Cellulitis    Noncompliance with medication regimen    Swelling of right hand    Chronic pulmonary embolism without acute cor pulmonale, unspecified pulmonary embolism type (H)  Discharge:   Discharge Date: 04/18/24  Discharge Diagnosis: Lactic acidosis    Cellulitis    Noncompliance with medication regimen    Swelling of right hand    Chronic pulmonary embolism without acute cor pulmonale, unspecified pulmonary embolism type (H)    BACKGROUND                                                      Per hospital discharge summary and inpatient provider notes:    José Luis Bocanegra is a 64 year old female with history of pulmonary embolism, hypertension, GERD, and morbid obesity admitted on 4/14/2024 with painful acute on chronic right hand swelling and erythema.        ASSESSMENT           Discharge Assessment  How are you doing now that you are home?: feeling better  How are your symptoms? (Red Flag symptoms escalate to triage hotline per guidelines): Improved  Do you feel your condition is stable enough to be safe at home until your provider visit?: Yes  Does the patient have their discharge instructions? : Yes  Does the patient have questions regarding their discharge instructions? : No  Were you started on any new medications or were there changes to any of your previous medications? : Yes  Does the patient have all of their medications?: Yes  Do you have questions regarding any of your medications? : No  Do you have all of your needed medical supplies or equipment (DME)?  (i.e. oxygen tank, CPAP, cane, etc.): Yes  Discharge follow-up appointment scheduled within 14 calendar days? : Yes  Discharge Follow Up Appointment Date: 04/22/24  Discharge Follow Up Appointment Scheduled with?: Primary Care  Provider    Post-op ([facility] CTA Only)  If the patient had a surgery or procedure, do they have any questions for a nurse?: No         PLAN                                                      Outpatient Plan:      No future appointments.    Follow-ups Needed After Discharge  Follow-up Appointments     Follow Up Care      Please follow-up with hand surgery team at [facility] in 1 week   if unable to follow up with PCP by that time. Call our scheduling line at   [phone] to make an appointment, if you do not already have one   scheduled.        Follow-up and recommended labs and tests       Follow up with primary care provider, Bonnie Crain, within 7 days for   hospital follow- up.       Continue cephalexin for the next 6 days.  Continue rivaroxaban 15 mg twice   daily for the next 18 days and then switch to 20 mg daily afterwards.      For any urgent concerns, please contact our 24 hour nurse triage line: [phone]     GERARDO Hawley  [phone]  [facility]

## 2024-04-22 ENCOUNTER — TRANSFERRED RECORDS (OUTPATIENT)
Dept: HEALTH INFORMATION MANAGEMENT | Facility: CLINIC | Age: 64
End: 2024-04-22
Payer: COMMERCIAL

## 2024-04-22 LAB — PHQ9 SCORE: 0

## 2024-04-23 ENCOUNTER — MEDICAL CORRESPONDENCE (OUTPATIENT)
Dept: HEALTH INFORMATION MANAGEMENT | Facility: CLINIC | Age: 64
End: 2024-04-23
Payer: COMMERCIAL

## 2024-04-24 NOTE — TELEPHONE ENCOUNTER
Tried calling patient to schedule NEW PH with testing prior with no answer or VM will try again later or tomorrow.    Lisa Stein  Clinic    Pulmonary Hypertension   [facility]  (P) [phone]

## 2024-04-26 ENCOUNTER — TELEPHONE (OUTPATIENT)
Dept: PULMONOLOGY | Facility: CLINIC | Age: 64
End: 2024-04-26

## 2024-04-29 ENCOUNTER — TRANSCRIBE ORDERS (OUTPATIENT)
Dept: OTHER | Age: 64
End: 2024-04-29

## 2024-04-29 ENCOUNTER — APPOINTMENT (OUTPATIENT)
Dept: INTERPRETER SERVICES | Facility: CLINIC | Age: 64
End: 2024-04-29
Payer: COMMERCIAL

## 2024-04-29 DIAGNOSIS — I27.29 OTHER SECONDARY PULMONARY HYPERTENSION (H): Primary | ICD-10-CM

## 2024-05-06 ENCOUNTER — APPOINTMENT (OUTPATIENT)
Dept: INTERPRETER SERVICES | Facility: CLINIC | Age: 64
End: 2024-05-06
Payer: COMMERCIAL

## 2024-05-13 ENCOUNTER — PATIENT OUTREACH (OUTPATIENT)
Dept: CARE COORDINATION | Facility: CLINIC | Age: 64
End: 2024-05-13
Payer: COMMERCIAL

## 2024-05-13 ENCOUNTER — APPOINTMENT (OUTPATIENT)
Dept: INTERPRETER SERVICES | Facility: CLINIC | Age: 64
End: 2024-05-13
Payer: COMMERCIAL

## 2024-05-13 ENCOUNTER — TELEPHONE (OUTPATIENT)
Dept: CARDIOLOGY | Facility: CLINIC | Age: 64
End: 2024-05-13
Payer: COMMERCIAL

## 2024-05-13 DIAGNOSIS — I10 BENIGN ESSENTIAL HYPERTENSION: ICD-10-CM

## 2024-05-13 DIAGNOSIS — K21.9 GERD (GASTROESOPHAGEAL REFLUX DISEASE): ICD-10-CM

## 2024-05-13 DIAGNOSIS — I27.20 PULMONARY HYPERTENSION (H): Primary | ICD-10-CM

## 2024-05-13 DIAGNOSIS — R06.02 SOB (SHORTNESS OF BREATH): ICD-10-CM

## 2024-05-13 DIAGNOSIS — E66.01 MORBID OBESITY (H): ICD-10-CM

## 2024-05-13 NOTE — PROGRESS NOTES
Social Work - Intervention  [facility]  Data/Intervention:    Patient Name: José Luis Bocanegra Goes By: José Luis    /Age: 1960 (64 year old)     Visit Type: telephone  Referral Source: Cardiology  Reason for Referral: Transportation help    Collaborated With:    -José Luis- [phone]  -Daphney   -[facility]     Psychosocial Information/Concerns:  Referral received indicating the above need.     Per chart review José Luis has an appt . Per [employer] José Luis has [facility], which has transportation benefits.      Intervention/Education/Resources Provided:  I contacted José Luis with assistance of a [employer]  and introduced myself and noted the reason for my outreach. José Luis explained that her  typically drives here, but only short distances and to familiar places, which the [facility] is not so he is not comfortable driving her. I explained the [facility] transportation benefits and José Luis asked if she were to call them if they would have a [employer]  for her. I explained how she could get connected with a [employer]  when she calls. I also explained the process for arranging transportation with [facility] and the information she will need to provide. José Luis noted she does not need w/c transport, and can get in and out of a regular car ok depending on pain. José Luis noted no mobility aids are needed.   I explained that I can assist in arranging transportation through [facility] for the  appt but unfortunately there is not staff at the clinic who arrange transportation for patients on an ongoing basis. José Luis expressed understanding.     Together with José Luis and ong , I called the [facility] Transportation team and arranged the following transportation for :  -  address: [address] [place] [address], [facility] [address]  - One passenger (spouse) accompanying to appt  - Cab company: [employer]  -  time: between 8:55am and 9:25am   - Drop off location: [place] [address]  [facility]- arrival time 10:15am  - Will call return ride: pt or clinic staff will need to call [phone]    José Luis colunga any further SW needs or questions at this time     Assessment/Plan:  No follow up is planned as all needs have been addressed.     ALEC Oreilly, [facility]    [facility]  Ph: [phone]  5/13/2024

## 2024-05-13 NOTE — TELEPHONE ENCOUNTER
Orders placed and referral to HIEU nicholas. Cheyenne Alvarez, RN on 5/13/2024 at 9:12 AM    ----- Message from Lisa Stein sent at 5/13/2024  8:38 AM CDT -----  Regarding: RE: ORDERS/ ECHO/ PRINT SEND FOR   Dr Param Coyle, 6 min walk , PFT orders please     Does anybody know how to help this patient get set up for a  to help set up rides? This patient also would like a in person  for all appointments if possible?  ----- Message -----  From: Lisa Stein  Sent: 5/6/2024  12:47 PM CDT  To: Lisa Stein  Subject: ORDERS/ ECHO/ PRINT SEND FOR

## 2024-06-11 ENCOUNTER — OFFICE VISIT (OUTPATIENT)
Dept: PULMONOLOGY | Facility: CLINIC | Age: 64
End: 2024-06-11
Attending: INTERNAL MEDICINE
Payer: COMMERCIAL

## 2024-06-11 ENCOUNTER — LAB (OUTPATIENT)
Dept: LAB | Facility: CLINIC | Age: 64
End: 2024-06-11
Payer: COMMERCIAL

## 2024-06-11 ENCOUNTER — OFFICE VISIT (OUTPATIENT)
Dept: CARDIOLOGY | Facility: CLINIC | Age: 64
End: 2024-06-11
Attending: INTERNAL MEDICINE
Payer: COMMERCIAL

## 2024-06-11 VITALS
DIASTOLIC BLOOD PRESSURE: 87 MMHG | HEART RATE: 81 BPM | SYSTOLIC BLOOD PRESSURE: 128 MMHG | OXYGEN SATURATION: 98 % | WEIGHT: 196.4 LBS | BODY MASS INDEX: 39.67 KG/M2

## 2024-06-11 DIAGNOSIS — R06.02 SOB (SHORTNESS OF BREATH): ICD-10-CM

## 2024-06-11 DIAGNOSIS — I10 BENIGN ESSENTIAL HYPERTENSION: ICD-10-CM

## 2024-06-11 DIAGNOSIS — E66.01 MORBID OBESITY (H): ICD-10-CM

## 2024-06-11 DIAGNOSIS — I27.20 PULMONARY HYPERTENSION (H): ICD-10-CM

## 2024-06-11 DIAGNOSIS — K21.9 GERD (GASTROESOPHAGEAL REFLUX DISEASE): ICD-10-CM

## 2024-06-11 DIAGNOSIS — I27.20 PULMONARY HYPERTENSION (H): Primary | ICD-10-CM

## 2024-06-11 DIAGNOSIS — I27.82 CHRONIC PULMONARY EMBOLISM WITHOUT ACUTE COR PULMONALE, UNSPECIFIED PULMONARY EMBOLISM TYPE (H): ICD-10-CM

## 2024-06-11 DIAGNOSIS — K21.9 GASTROESOPHAGEAL REFLUX DISEASE, UNSPECIFIED WHETHER ESOPHAGITIS PRESENT: ICD-10-CM

## 2024-06-11 LAB
6 MIN WALK (FT): 850 FT
6 MIN WALK (M): 259 M
ALBUMIN SERPL BCG-MCNC: 3.8 G/DL (ref 3.5–5.2)
ALP SERPL-CCNC: 135 U/L (ref 40–150)
ALT SERPL W P-5'-P-CCNC: 39 U/L (ref 0–50)
ANION GAP SERPL CALCULATED.3IONS-SCNC: 10 MMOL/L (ref 7–15)
AST SERPL W P-5'-P-CCNC: 52 U/L (ref 0–45)
BILIRUB DIRECT SERPL-MCNC: 0.21 MG/DL (ref 0–0.3)
BILIRUB SERPL-MCNC: 0.8 MG/DL
BUN SERPL-MCNC: 17.1 MG/DL (ref 8–23)
CALCIUM SERPL-MCNC: 9.2 MG/DL (ref 8.8–10.2)
CHLORIDE SERPL-SCNC: 106 MMOL/L (ref 98–107)
CHOLEST SERPL-MCNC: 198 MG/DL
CREAT SERPL-MCNC: 1.34 MG/DL (ref 0.51–0.95)
CRP SERPL-MCNC: 5.3 MG/L
DEPRECATED HCO3 PLAS-SCNC: 25 MMOL/L (ref 22–29)
EGFRCR SERPLBLD CKD-EPI 2021: 44 ML/MIN/1.73M2
ERYTHROCYTE [DISTWIDTH] IN BLOOD BY AUTOMATED COUNT: 15.1 % (ref 10–15)
FASTING STATUS PATIENT QL REPORTED: NO
FASTING STATUS PATIENT QL REPORTED: NO
GLUCOSE SERPL-MCNC: 248 MG/DL (ref 70–99)
HBV CORE AB SERPL QL IA: REACTIVE
HBV SURFACE AB SERPL IA-ACNC: 85.2 M[IU]/ML
HBV SURFACE AB SERPL IA-ACNC: REACTIVE M[IU]/ML
HBV SURFACE AG SERPL QL IA: NONREACTIVE
HCT VFR BLD AUTO: 42.4 % (ref 35–47)
HCV AB SERPL QL IA: NONREACTIVE
HDLC SERPL-MCNC: 42 MG/DL
HGB BLD-MCNC: 13.1 G/DL (ref 11.7–15.7)
HIV 1+2 AB+HIV1 P24 AG SERPL QL IA: NONREACTIVE
INR PPP: 1.26 (ref 0.85–1.15)
IRON BINDING CAPACITY (ROCHE): 284 UG/DL (ref 240–430)
IRON SATN MFR SERPL: 20 % (ref 15–46)
IRON SERPL-MCNC: 56 UG/DL (ref 37–145)
LA PPP-IMP: NORMAL
LDLC SERPL CALC-MCNC: 117 MG/DL
MCH RBC QN AUTO: 27.6 PG (ref 26.5–33)
MCHC RBC AUTO-ENTMCNC: 30.9 G/DL (ref 31.5–36.5)
MCV RBC AUTO: 90 FL (ref 78–100)
NONHDLC SERPL-MCNC: 156 MG/DL
NT-PROBNP SERPL-MCNC: 83 PG/ML (ref 0–900)
PLATELET # BLD AUTO: 155 10E3/UL (ref 150–450)
POTASSIUM SERPL-SCNC: 3.9 MMOL/L (ref 3.4–5.3)
PROT SERPL-MCNC: 7 G/DL (ref 6.4–8.3)
RBC # BLD AUTO: 4.74 10E6/UL (ref 3.8–5.2)
RHEUMATOID FACT SERPL-ACNC: <10 IU/ML
SODIUM SERPL-SCNC: 141 MMOL/L (ref 135–145)
TRIGL SERPL-MCNC: 193 MG/DL
TSH SERPL DL<=0.005 MIU/L-ACNC: 2.6 UIU/ML (ref 0.3–4.2)
WBC # BLD AUTO: 6.3 10E3/UL (ref 4–11)

## 2024-06-11 PROCEDURE — 94726 PLETHYSMOGRAPHY LUNG VOLUMES: CPT | Performed by: INTERNAL MEDICINE

## 2024-06-11 PROCEDURE — 99204 OFFICE O/P NEW MOD 45 MIN: CPT | Performed by: INTERNAL MEDICINE

## 2024-06-11 PROCEDURE — 99207 PR NO CHARGE LOS: CPT

## 2024-06-11 PROCEDURE — 86140 C-REACTIVE PROTEIN: CPT | Performed by: PATHOLOGY

## 2024-06-11 PROCEDURE — 94729 DIFFUSING CAPACITY: CPT | Performed by: INTERNAL MEDICINE

## 2024-06-11 PROCEDURE — 99207 LUPUS ANTICOAGULANT PANEL: CPT

## 2024-06-11 PROCEDURE — 83880 ASSAY OF NATRIURETIC PEPTIDE: CPT | Performed by: PATHOLOGY

## 2024-06-11 PROCEDURE — 85610 PROTHROMBIN TIME: CPT | Performed by: PATHOLOGY

## 2024-06-11 PROCEDURE — 80053 COMPREHEN METABOLIC PANEL: CPT | Performed by: PATHOLOGY

## 2024-06-11 PROCEDURE — 83529 ASAY OF INTERLEUKIN-6 (IL-6): CPT | Performed by: PATHOLOGY

## 2024-06-11 PROCEDURE — 86803 HEPATITIS C AB TEST: CPT | Performed by: INTERNAL MEDICINE

## 2024-06-11 PROCEDURE — 84443 ASSAY THYROID STIM HORMONE: CPT | Performed by: PATHOLOGY

## 2024-06-11 PROCEDURE — 94375 RESPIRATORY FLOW VOLUME LOOP: CPT | Performed by: INTERNAL MEDICINE

## 2024-06-11 PROCEDURE — 86431 RHEUMATOID FACTOR QUANT: CPT | Performed by: INTERNAL MEDICINE

## 2024-06-11 PROCEDURE — 87340 HEPATITIS B SURFACE AG IA: CPT | Performed by: INTERNAL MEDICINE

## 2024-06-11 PROCEDURE — 94618 PULMONARY STRESS TESTING: CPT | Performed by: INTERNAL MEDICINE

## 2024-06-11 PROCEDURE — 85613 RUSSELL VIPER VENOM DILUTED: CPT | Performed by: INTERNAL MEDICINE

## 2024-06-11 PROCEDURE — 86038 ANTINUCLEAR ANTIBODIES: CPT | Performed by: INTERNAL MEDICINE

## 2024-06-11 PROCEDURE — 99000 SPECIMEN HANDLING OFFICE-LAB: CPT | Performed by: PATHOLOGY

## 2024-06-11 PROCEDURE — 36415 COLL VENOUS BLD VENIPUNCTURE: CPT | Performed by: PATHOLOGY

## 2024-06-11 PROCEDURE — 83540 ASSAY OF IRON: CPT | Performed by: PATHOLOGY

## 2024-06-11 PROCEDURE — 80061 LIPID PANEL: CPT | Performed by: PATHOLOGY

## 2024-06-11 PROCEDURE — 86704 HEP B CORE ANTIBODY TOTAL: CPT | Performed by: INTERNAL MEDICINE

## 2024-06-11 PROCEDURE — 82248 BILIRUBIN DIRECT: CPT | Performed by: PATHOLOGY

## 2024-06-11 PROCEDURE — G0463 HOSPITAL OUTPT CLINIC VISIT: HCPCS | Performed by: INTERNAL MEDICINE

## 2024-06-11 PROCEDURE — 85027 COMPLETE CBC AUTOMATED: CPT | Performed by: PATHOLOGY

## 2024-06-11 PROCEDURE — 87389 HIV-1 AG W/HIV-1&-2 AB AG IA: CPT | Performed by: INTERNAL MEDICINE

## 2024-06-11 PROCEDURE — 84238 ASSAY NONENDOCRINE RECEPTOR: CPT | Mod: 90 | Performed by: PATHOLOGY

## 2024-06-11 PROCEDURE — 83550 IRON BINDING TEST: CPT | Performed by: PATHOLOGY

## 2024-06-11 PROCEDURE — 86706 HEP B SURFACE ANTIBODY: CPT | Performed by: INTERNAL MEDICINE

## 2024-06-11 RX ORDER — LOSARTAN POTASSIUM 50 MG/1
TABLET ORAL
COMMUNITY
Start: 2024-04-22

## 2024-06-11 RX ORDER — EMPAGLIFLOZIN 10 MG/1
TABLET, FILM COATED ORAL
COMMUNITY
Start: 2024-05-30

## 2024-06-11 RX ORDER — RIVAROXABAN 20 MG/1
TABLET, FILM COATED ORAL
COMMUNITY
Start: 2024-05-30

## 2024-06-11 RX ORDER — COLCHICINE 0.6 MG/1
1 TABLET ORAL
COMMUNITY
Start: 2024-05-23

## 2024-06-11 RX ORDER — PREDNISONE 20 MG/1
TABLET ORAL
COMMUNITY
Start: 2024-05-23

## 2024-06-11 RX ORDER — PREDNISONE 10 MG/1
TABLET ORAL
COMMUNITY
Start: 2024-05-16

## 2024-06-11 ASSESSMENT — PAIN SCALES - GENERAL: PAINLEVEL: NO PAIN (0)

## 2024-06-11 NOTE — PROGRESS NOTES
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  Oral: moist mucous membrans  Lymph: without pathologic adenopathy  Chest: clear to ausculation and percussion  Cor: No evidence of left or right ventricular activity.  Rhythm is regular.  S1 normal, S2 split physiologically. Murmurs are not present  Abdomen: without tenderness, rebound, guarding, masses, ascites  Extremities: Edema not present  Neuro: no focal defects, normal mentation  Skin: without open lesions  Psych: oriented, verbal, mental status in tact       Wt Readings from Last 24 Encounters:   06/11/24 89.1 kg (196 lb 6.4 oz)   04/14/24 86.2 kg (190 lb)   03/29/24 90.3 kg (199 lb)   01/10/23 (!) 191 kg (421 lb 1.3 oz)   01/10/23 87 kg (191 lb 12.8 oz)   04/01/22 82.3 kg (181 lb 7 oz)   02/17/20 82.3 kg (181 lb 6 oz)   11/20/19 80.1 kg (176 lb 8 oz)   08/08/18 81.2 kg (179 lb)   06/11/18 81 kg (178 lb 9.6 oz)        Current Outpatient Medications   Medication Sig Dispense Refill    acetaminophen (TYLENOL) 325 MG tablet Take 325-650 mg by mouth every 6 hours as needed for mild pain      allopurinol (ZYLOPRIM) 100 MG tablet Take 100 mg by mouth 2 times daily      atorvastatin (LIPITOR) 20 MG tablet Take 20 mg by mouth daily      colchicine (COLCRYS) 0.6 MG tablet Take 1 tablet by mouth daily at 2 pm      diclofenac (VOLTAREN) 1 % topical gel Apply 2 g topically 3 times daily as needed for moderate pain APPLY 2 GRAMS TO HAND AND FEET THREE TIMES A DAY AS NEEDED FOR PAIN.//PLEEV 2 GRAMS 3 ZAUG MELITONA JESUS YOG MOB       glipiZIDE (GLUCOTROL XL) 10 MG 24 hr tablet Take 10 mg by mouth daily      JARDIANCE 10 MG TABS tablet TAKE 1 TABLET DAILY FOR DIABETES // 1 HNUB NOJ 1 LUB PAB DILSHAD NTSHAV QAB ZIB      lisinopril (ZESTRIL) 20 MG tablet Take 20 mg by mouth daily TAKE 1 TABLET DAILY BY MOUTH FOR HIGH BLOOD PRESSURE // IB HNUB NOJ 1 LUB PAB DILSHAD NTSHAV SIAB      losartan (COZAAR) 50 MG tablet TAKE 1 TABLET DAILY BY MOUTH FOR HIGH BLOOD PRESSURE // IB HNUB NOJ 1 LUB PAB DILSHAD NTSHAV SIAB      predniSONE (DELTASONE) 10 MG tablet PLEASE SEE ATTACHED FOR DETAILED DIRECTIONS      predniSONE (DELTASONE) 20 MG tablet PLEASE SEE ATTACHED FOR DETAILED DIRECTIONS      XARELTO ANTICOAGULANT 20 MG TABS tablet TAKE 1 TABLET BY MOUTH ONCE A DAY WITH EVENING MEAL AFTER YOU COMPLETE DVT PACK./IB HNUB NOJ 1 LUB NROG HMO RAWS XIOMY QHIA.      Rivaroxaban ANTICOAGULANT 15 & 20 MG TBPK Starter Therapy Pack Take 15 mg by mouth 2 times daily (with meals) for 18 days, THEN 20 mg daily with food for 12 days. 51 each 0     No current facility-administered medications for this visit.

## 2024-06-11 NOTE — NURSING NOTE
".Plan as patient understands:  Echo w/bubble  Dual CT  US Liver  Follow up after    ========================  Reviewed Med list  Completed AVS  Follow-up orders placed  Marked chart \"Ready for Checkout\"  Reviewed testing with  in person.  Escorted all 3 to coordinator to schedule.  Patient verbalized understanding, agreed with plan and denied any further questions. Nina Collado RN on 6/11/2024 at 2:50 PM    "

## 2024-06-11 NOTE — LETTER
6/11/2024      RE: José Luis Bocanegra  [address]  [place] [address]       Dear Colleague,    Thank you for the opportunity to participate in the care of your patient, José Luis Bocanegra, at the [facility]. Please see a copy of my visit note below.            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  Oral: moist mucous membrans  Lymph: without pathologic adenopathy  Chest: clear to ausculation and percussion  Cor: No evidence of left or right ventricular activity.  Rhythm is regular.  S1 normal, S2 split physiologically. Murmurs are not present  Abdomen: without tenderness, rebound, guarding, masses, ascites  Extremities: Edema not present  Neuro: no focal defects, normal mentation  Skin: without open lesions  Psych: oriented, verbal, mental status in tact       Wt Readings from Last 24 Encounters:   06/11/24 89.1 kg (196 lb 6.4 oz)   04/14/24 86.2 kg (190 lb)   03/29/24 90.3 kg (199 lb)   01/10/23 (!) 191 kg (421 lb 1.3 oz)   01/10/23 87 kg (191 lb 12.8 oz)   04/01/22 82.3 kg (181 lb 7 oz)   02/17/20 82.3 kg (181 lb 6 oz)   11/20/19 80.1 kg (176 lb 8 oz)   08/08/18 81.2 kg (179 lb)   06/11/18 81 kg (178 lb 9.6 oz)        Current Outpatient Medications   Medication Sig Dispense Refill     acetaminophen (TYLENOL) 325 MG tablet Take 325-650 mg by mouth every 6 hours as needed for mild pain       allopurinol (ZYLOPRIM) 100 MG tablet Take 100 mg by mouth 2 times daily       atorvastatin (LIPITOR) 20 MG  Observation goals  PRIOR TO DISCHARGE        Comments: -diagnostic tests and consults completed and resulted: No    -vital signs normal or at patient baseline:Met    -tolerating oral intake to maintain hydration : Not met, NPO at midnight, continues with LR at 125 ml/hr    -adequate pain control on oral analgesics:Not met, Tylenol administered with relief    -returns to baseline functional status: Not met, admitted with GJ tube problem    -GI consult completed with dispo plan : In progress  Independent with amulation. Spontaneously voiding. Call light within reach                    Nurse to notify provider when observation goals have been met and patient is ready for discharge.              tablet Take 20 mg by mouth daily       colchicine (COLCRYS) 0.6 MG tablet Take 1 tablet by mouth daily at 2 pm       diclofenac (VOLTAREN) 1 % topical gel Apply 2 g topically 3 times daily as needed for moderate pain APPLY 2 GRAMS TO HAND AND FEET THREE TIMES A DAY AS NEEDED FOR PAIN.//PLEEV 2 GRAMS 3 ZAUG TXHUA HNUB YOG MOB       glipiZIDE (GLUCOTROL XL) 10 MG 24 hr tablet Take 10 mg by mouth daily       JARDIANCE 10 MG TABS tablet TAKE 1 TABLET DAILY FOR DIABETES // 1 HNUB NOJ 1 LUB PAB DILSHAD NTSHAV QAB ZIB       lisinopril (ZESTRIL) 20 MG tablet Take 20 mg by mouth daily TAKE 1 TABLET DAILY BY MOUTH FOR HIGH BLOOD PRESSURE // IB HNUB NOJ 1 LUB PAB DILSHAD NTSHAV SIAB       losartan (COZAAR) 50 MG tablet TAKE 1 TABLET DAILY BY MOUTH FOR HIGH BLOOD PRESSURE // IB HNUB NOJ 1 LUB PAB DILSHAD NTSHAV SIAB       predniSONE (DELTASONE) 10 MG tablet PLEASE SEE ATTACHED FOR DETAILED DIRECTIONS       predniSONE (DELTASONE) 20 MG tablet PLEASE SEE ATTACHED FOR DETAILED DIRECTIONS       XARELTO ANTICOAGULANT 20 MG TABS tablet TAKE 1 TABLET BY MOUTH ONCE A DAY WITH EVENING MEAL AFTER YOU COMPLETE DVT PACK./IB HNUB NOJ 1 LUB NROG HMO RAWS XIOMY [place].       Rivaroxaban ANTICOAGULANT 15 & 20 MG TBPK Starter Therapy Pack Take 15 mg by mouth 2 times daily (with meals) for 18 days, THEN 20 mg daily with food for 12 days. 51 each 0     No current facility-administered medications for this visit.        Please do not hesitate to contact me if you have any questions/concerns.     Sincerely,     Deon Virgen MD

## 2024-06-11 NOTE — PATIENT INSTRUCTIONS
You were seen today in the Pulmonary Hypertension Clinic at the [facility].     Cardiology Provider you saw during your visit:    Dr. Virgen    Medication Changes:  none    Results:   Component      Latest Ref Rng 6/11/2024  10:57 AM   Sodium      135 - 145 mmol/L 141    Potassium      3.4 - 5.3 mmol/L 3.9    Chloride      98 - 107 mmol/L 106    Carbon Dioxide (CO2)      22 - 29 mmol/L 25    Anion Gap      7 - 15 mmol/L 10    Urea Nitrogen      8.0 - 23.0 mg/dL 17.1    Creatinine      0.51 - 0.95 mg/dL 1.34 (H)    GFR Estimate      >60 mL/min/1.73m2 44 (L)    Calcium      8.8 - 10.2 mg/dL 9.2    Glucose      70 - 99 mg/dL 248 (H)    Patient Fasting? No    Patient Fasting? No    WBC      4.0 - 11.0 10e3/uL 6.3    RBC Count      3.80 - 5.20 10e6/uL 4.74    Hemoglobin      11.7 - 15.7 g/dL 13.1    Hematocrit      35.0 - 47.0 % 42.4    MCV      78 - 100 fL 90    MCH      26.5 - 33.0 pg 27.6    MCHC      31.5 - 36.5 g/dL 30.9 (L)    RDW      10.0 - 15.0 % 15.1 (H)    Platelet Count      150 - 450 10e3/uL 155    Protein Total      6.4 - 8.3 g/dL 7.0    Albumin      3.5 - 5.2 g/dL 3.8    Bilirubin Total      <=1.2 mg/dL 0.8    Alkaline Phosphatase      40 - 150 U/L 135    AST      0 - 45 U/L 52 (H)    ALT      0 - 50 U/L 39    Bilirubin Direct      0.00 - 0.30 mg/dL 0.21    Cholesterol      <200 mg/dL 198    Triglycerides      <150 mg/dL 193 (H)    HDL Cholesterol      >=50 mg/dL 42 (L)    LDL Cholesterol Calculated      <=100 mg/dL 117 (H)    Non HDL Cholesterol      <130 mg/dL 156 (H)    Iron      37 - 145 ug/dL 56    Iron Binding Capacity      240 - 430 ug/dL 284    Iron Sat Index      15 - 46 % 20    INR      0.85 - 1.15  1.26 (H)    N-Terminal Pro Bnp      0 - 900 pg/mL 83    TSH      0.30 - 4.20 uIU/mL 2.60    Lupus Result --    CRP Inflammation      <5.00 mg/L 5.30 (H)       Legend:  (H) High  (L) Low    Recommendations:   Echo with bubble study  Dual beam chest CT  Liver US    Follow-up:   Follow up  after testing is complete      Please call us immediately if you have any syncope (fainting or passing out), chest pain, edema (swelling or weight gain), or decline in your functional status (general decline in how you are feeling).    If you have emergent concerns after hours or on the weekend, please call our on-call Cardiologist at [phone], option [phone]. For emergencies call [phone].     Thank you for allowing us to be a part of your care here at the [facility]    If you have questions or concerns please contact us at:    Nina Collado, RN (P: [phone])    Nurse Coordinator       Pulmonary Hypertension     [facility]         NEGAR [facility]   (Prior Auths & Pt Assistance)   ()  Clinic   Clinic   Pulmonary Hypertension   Pulmonary Hypertension  [facility]  (P)[phone]    (P) [phone]  (F) [phone]

## 2024-06-11 NOTE — NURSING NOTE
Chief Complaint   Patient presents with    New Patient     New PH referral from Blanca Crain PA-C     Vitals were taken and medications were reconciled.    Teja Chow, Visit Facilitator Clinic  2:02 PM

## 2024-06-12 LAB
ANA SER QL IF: NEGATIVE
DLCOCOR-%PRED-PRE: 66 %
DLCOCOR-PRE: 11.33 ML/MIN/MMHG
DLCOUNC-%PRED-PRE: 65 %
DLCOUNC-PRE: 11.22 ML/MIN/MMHG
DLCOUNC-PRED: 17.09 ML/MIN/MMHG
ERV-%PRED-PRE: 6 %
ERV-PRE: 0.05 L
ERV-PRED: 0.87 L
EXPTIME-PRE: 4.66 SEC
FEF2575-%PRED-PRE: 56 %
FEF2575-PRE: 1.03 L/SEC
FEF2575-PRED: 1.81 L/SEC
FEFMAX-%PRED-PRE: 45 %
FEFMAX-PRE: 2.43 L/SEC
FEFMAX-PRED: 5.37 L/SEC
FEV1-%PRED-PRE: 45 %
FEV1-PRE: 0.87 L
FEV1FEV6-PRE: 87 %
FEV1FEV6-PRED: 80 %
FEV1FVC-PRE: 87 %
FEV1FVC-PRED: 81 %
FEV1SVC-PRE: 70 %
FEV1SVC-PRED: 71 %
FIFMAX-PRE: 1.68 L/SEC
FRCPLETH-%PRED-PRE: 63 %
FRCPLETH-PRE: 1.55 L
FRCPLETH-PRED: 2.42 L
FVC-%PRED-PRE: 42 %
FVC-PRE: 1 L
FVC-PRED: 2.35 L
IC-%PRED-PRE: 67 %
IC-PRE: 1.18 L
IC-PRED: 1.73 L
IL6 SERPL-MCNC: 6.08 PG/ML
RVPLETH-%PRED-PRE: 86 %
RVPLETH-PRE: 1.5 L
RVPLETH-PRED: 1.72 L
TLCPLETH-%PRED-PRE: 66 %
TLCPLETH-PRE: 2.72 L
TLCPLETH-PRED: 4.1 L
VA-%PRED-PRE: 55 %
VA-PRE: 2.15 L
VC-%PRED-PRE: 46 %
VC-PRE: 1.23 L
VC-PRED: 2.66 L

## 2024-06-13 LAB — STFR SERPL-MCNC: 5.8 MG/L

## 2024-06-27 ENCOUNTER — TELEPHONE (OUTPATIENT)
Dept: CARDIOLOGY | Facility: CLINIC | Age: 64
End: 2024-06-27

## 2024-06-27 ENCOUNTER — HOSPITAL ENCOUNTER (OUTPATIENT)
Dept: CT IMAGING | Facility: CLINIC | Age: 64
Discharge: HOME | End: 2024-06-27
Attending: INTERNAL MEDICINE | Admitting: INTERNAL MEDICINE
Payer: COMMERCIAL

## 2024-06-27 ENCOUNTER — ANCILLARY PROCEDURE (OUTPATIENT)
Dept: CARDIOLOGY | Facility: CLINIC | Age: 64
End: 2024-06-27
Attending: INTERNAL MEDICINE
Payer: COMMERCIAL

## 2024-06-27 ENCOUNTER — ANCILLARY PROCEDURE (OUTPATIENT)
Dept: ULTRASOUND IMAGING | Facility: CLINIC | Age: 64
End: 2024-06-27
Attending: INTERNAL MEDICINE
Payer: COMMERCIAL

## 2024-06-27 DIAGNOSIS — R06.02 SOB (SHORTNESS OF BREATH): ICD-10-CM

## 2024-06-27 DIAGNOSIS — I27.82 CHRONIC PULMONARY EMBOLISM WITHOUT ACUTE COR PULMONALE, UNSPECIFIED PULMONARY EMBOLISM TYPE (H): ICD-10-CM

## 2024-06-27 DIAGNOSIS — I27.20 PULMONARY HYPERTENSION (H): ICD-10-CM

## 2024-06-27 DIAGNOSIS — K21.9 GASTROESOPHAGEAL REFLUX DISEASE, UNSPECIFIED WHETHER ESOPHAGITIS PRESENT: ICD-10-CM

## 2024-06-27 DIAGNOSIS — I27.20 PULMONARY HYPERTENSION (H): Primary | ICD-10-CM

## 2024-06-27 PROCEDURE — 71275 CT ANGIOGRAPHY CHEST: CPT | Mod: 26 | Performed by: RADIOLOGY

## 2024-06-27 PROCEDURE — 76705 ECHO EXAM OF ABDOMEN: CPT | Mod: GC | Performed by: RADIOLOGY

## 2024-06-27 PROCEDURE — 250N000011 HC RX IP 250 OP 636: Performed by: INTERNAL MEDICINE

## 2024-06-27 PROCEDURE — 93306 TTE W/DOPPLER COMPLETE: CPT | Performed by: INTERNAL MEDICINE

## 2024-06-27 PROCEDURE — 99207 PR STATISTIC IV PUSH SINGLE INITIAL SUBSTANCE: CPT | Performed by: INTERNAL MEDICINE

## 2024-06-27 PROCEDURE — 71275 CT ANGIOGRAPHY CHEST: CPT

## 2024-06-27 RX ORDER — IOPAMIDOL 755 MG/ML
100 INJECTION, SOLUTION INTRAVASCULAR ONCE
Status: COMPLETED | OUTPATIENT
Start: 2024-06-27 | End: 2024-06-27

## 2024-06-27 RX ADMIN — Medication 6 ML: at 12:26

## 2024-06-27 RX ADMIN — IOPAMIDOL 100 ML: 755 INJECTION, SOLUTION INTRAVENOUS at 13:16

## 2024-06-27 NOTE — TELEPHONE ENCOUNTER
6/27/2024 12:46PM Merlene Ledesma  Patient confirmed rescheduled appointment:  Date: 6/27/2024  Time: 2:40PM  Visit type: CT Chest Pulmonary Embolism  Provider: LILIAN  Location: [facility], [address] [place] [id number]  Testing/imaging: NA  Additional notes: 6/27 Pt needed CT rescheduled over at [facility]. Called imaging and rescheduled pt. Pt on her way to [facility] now. GIOVANNI Ledesma 6/27/2024 12:46PM

## 2024-07-09 ENCOUNTER — OFFICE VISIT (OUTPATIENT)
Dept: CARDIOLOGY | Facility: CLINIC | Age: 64
End: 2024-07-09
Attending: INTERNAL MEDICINE
Payer: COMMERCIAL

## 2024-07-09 VITALS
WEIGHT: 197 LBS | DIASTOLIC BLOOD PRESSURE: 87 MMHG | SYSTOLIC BLOOD PRESSURE: 132 MMHG | BODY MASS INDEX: 39.79 KG/M2 | OXYGEN SATURATION: 93 % | HEART RATE: 86 BPM

## 2024-07-09 DIAGNOSIS — I27.82 CHRONIC PULMONARY EMBOLISM WITHOUT ACUTE COR PULMONALE, UNSPECIFIED PULMONARY EMBOLISM TYPE (H): ICD-10-CM

## 2024-07-09 DIAGNOSIS — R06.02 SOB (SHORTNESS OF BREATH): ICD-10-CM

## 2024-07-09 DIAGNOSIS — I27.20 PULMONARY HYPERTENSION (H): ICD-10-CM

## 2024-07-09 DIAGNOSIS — K21.9 GASTROESOPHAGEAL REFLUX DISEASE, UNSPECIFIED WHETHER ESOPHAGITIS PRESENT: ICD-10-CM

## 2024-07-09 PROCEDURE — G0463 HOSPITAL OUTPT CLINIC VISIT: HCPCS | Performed by: INTERNAL MEDICINE

## 2024-07-09 PROCEDURE — 99215 OFFICE O/P EST HI 40 MIN: CPT | Performed by: INTERNAL MEDICINE

## 2024-07-09 ASSESSMENT — PAIN SCALES - GENERAL: PAINLEVEL: NO PAIN (0)

## 2024-07-09 NOTE — PROGRESS NOTES
64 year old female seen for follow up of evaluation for PH    Plan:  VQ lung scan  Home oxygen 2L/minute with exertion  Hemoglobin A1C  Patient refuses right heart catheterization and/or coronary angiogram for multiple risk factors  Risks and benefits for procedure and or not doing it discussed in detail  Hemoglobin   A!C very high and needs to see primary care for correction  Non contrast CT liver for cirrhosis Hepatitis B +  May need to see Hepatology  Patient does not wish to be followed at [facility] secondary to distance  Will need to get oxygen from [place]    45 minutes with .      Current Outpatient Medications   Medication Sig Dispense Refill    acetaminophen (TYLENOL) 325 MG tablet Take 325-650 mg by mouth every 6 hours as needed for mild pain      allopurinol (ZYLOPRIM) 100 MG tablet Take 100 mg by mouth 2 times daily      atorvastatin (LIPITOR) 20 MG tablet Take 20 mg by mouth daily      colchicine (COLCRYS) 0.6 MG tablet Take 1 tablet by mouth daily at 2 pm      diclofenac (VOLTAREN) 1 % topical gel Apply 2 g topically 3 times daily as needed for moderate pain APPLY 2 GRAMS TO HAND AND FEET THREE TIMES A DAY AS NEEDED FOR PAIN.//PLEEV 2 GRAMS 3 ZAUG TXHUA HNUB YOG MOB      glipiZIDE (GLUCOTROL XL) 10 MG 24 hr tablet Take 10 mg by mouth daily      JARDIANCE 10 MG TABS tablet TAKE 1 TABLET DAILY FOR DIABETES // 1 HNUB NOJ 1 LUB PAB DILSHAD NTSHAV QAB ZIB      lisinopril (ZESTRIL) 20 MG tablet Take 20 mg by mouth daily TAKE 1 TABLET DAILY BY MOUTH FOR HIGH BLOOD PRESSURE // IB HNUB NOJ 1 LUB PAB DILSHAD NTSHAV SIAB      losartan (COZAAR) 50 MG tablet TAKE 1 TABLET DAILY BY MOUTH FOR HIGH BLOOD PRESSURE // IB HNUB NOJ 1 LUB PAB DILSHAD NTSHAV SIAB      predniSONE (DELTASONE) 10 MG tablet PLEASE SEE ATTACHED FOR DETAILED DIRECTIONS      predniSONE (DELTASONE) 20 MG tablet PLEASE SEE ATTACHED FOR DETAILED DIRECTIONS      XARELTO ANTICOAGULANT 20 MG TABS tablet TAKE 1 TABLET BY MOUTH ONCE A DAY WITH EVENING MEAL  AFTER YOU COMPLETE DVT PACK./IB HNUB NOJ 1 LUB NROG HMO RAWRALPH GARCIA.      Rivaroxaban ANTICOAGULANT 15 & 20 MG TBPK Starter Therapy Pack Take 15 mg by mouth 2 times daily (with meals) for 18 days, THEN 20 mg daily with food for 12 days. 51 each 0     No current facility-administered medications for this visit.       Latest Reference Range & Units 06/11/24 10:57   Sodium 135 - 145 mmol/L 141   Potassium 3.4 - 5.3 mmol/L 3.9   Chloride 98 - 107 mmol/L 106   Carbon Dioxide (CO2) 22 - 29 mmol/L 25   Urea Nitrogen 8.0 - 23.0 mg/dL 17.1   Creatinine 0.51 - 0.95 mg/dL 1.34 (H)   GFR Estimate >60 mL/min/1.73m2 44 (L)   Calcium 8.8 - 10.2 mg/dL 9.2   Anion Gap 7 - 15 mmol/L 10   Albumin 3.5 - 5.2 g/dL 3.8   Protein Total 6.4 - 8.3 g/dL 7.0   Alkaline Phosphatase 40 - 150 U/L 135   ALT 0 - 50 U/L 39   AST 0 - 45 U/L 52 (H)   Bilirubin Direct 0.00 - 0.30 mg/dL 0.21   Bilirubin Total <=1.2 mg/dL 0.8   Cholesterol <200 mg/dL 198   CRP Inflammation <5.00 mg/L 5.30 (H)   Patient Fasting?  No  No   Glucose 70 - 99 mg/dL 248 (H)   HDL Cholesterol >=50 mg/dL 42 (L)   Interleukin 6 Blood <3.01 pg/mL 6.08 (H)   Iron 37 - 145 ug/dL 56   Iron Binding Capacity 240 - 430 ug/dL 284   Iron Sat Index 15 - 46 % 20   LDL Cholesterol Calculated <=100 mg/dL 117 (H)   Non HDL Cholesterol <130 mg/dL 156 (H)   N-Terminal Pro Bnp 0 - 900 pg/mL 83   Rheumatoid Factor <14 IU/mL <10   Soluble Transferrin Receptor 1.9 - 4.4 mg/L 5.8 (H)   Triglycerides <150 mg/dL 193 (H)   TSH 0.30 - 4.20 uIU/mL 2.60   WBC 4.0 - 11.0 10e3/uL 6.3   Hemoglobin 11.7 - 15.7 g/dL 13.1   Hematocrit 35.0 - 47.0 % 42.4   Platelet Count 150 - 450 10e3/uL 155   RBC Count 3.80 - 5.20 10e6/uL 4.74   MCV 78 - 100 fL 90   MCH 26.5 - 33.0 pg 27.6   MCHC 31.5 - 36.5 g/dL 30.9 (L)   RDW 10.0 - 15.0 % 15.1 (H)     Wt Readings from Last 24 Encounters:   07/09/24 89.4 kg (197 lb)   06/11/24 89.1 kg (196 lb 6.4 oz)   04/14/24 86.2 kg (190 lb)   03/29/24 90.3 kg (199 lb)   01/10/23 (!) 191  kg (421 lb 1.3 oz)   01/10/23 87 kg (191 lb 12.8 oz)   22 82.3 kg (181 lb 7 oz)   20 82.3 kg (181 lb 6 oz)   19 80.1 kg (176 lb 8 oz)   18 81.2 kg (179 lb)   18 81 kg (178 lb 9.6 oz)        : 1960  Study Date: 2024 11:25 AM  Age: 64 yrs  Gender: Female  Patient Location: [facility]  Reason For Study: Pulmonary hypertension, SOB  Ordering Physician: FABIOLA JEAN  Referring Physician: FABIOLA JEAN  Performed By: Dahlia Fernández     BSA: 1.8 m2  Height: 59 in  Weight: 196 lb  HR: 79  BP: 148/100 mmHg  ______________________________________________________________________________  Procedure  Echocardiogram with two-dimensional, color and spectral Doppler performed.  Contrast Optison. Technically difficult study. Poor acoustic windows. Lumason  (NDC #7423-1294-25) given intravenously. IV start location RAC . Patient was  given 6 ml mixture of 3 ml Optison and 6 ml saline. 3 ml wasted.  ______________________________________________________________________________  Interpretation Summary  Technically difficult study. Poor acoustic windows.  Global and regional left ventricular function is normal with an EF of 55-60%.  Right ventricular wall thickness is 10mm (increased). Global right ventricular  function is borderline reduced.  No significant valvular abnormalities present.  The inferior vena cava was normal in size with preserved respiratory  variability.  No pericardial effusion is present.  There is no prior study for direct comparison.  ______________________________________________________________________________  Left Ventricle  Global and regional left ventricular function is normal with an EF of 55-60%.  Biplane LVEF is 58%. Grade I or early diastolic dysfunction.     Right Ventricle  Right ventricular wall thickness is 10mm (increased). Global right ventricular  function is borderline reduced.     Atria  Both atria appear normal. Lipomatous infiltration of  the interatrial septm is  present.     Mitral Valve  The mitral valve is normal. Trace mitral insufficiency is present.     Aortic Valve  The aortic valve is tricuspid. On Doppler interrogation, there is no  significant stenosis or regurgitation.     Tricuspid Valve  Trace tricuspid insufficiency is present. The peak velocity of the tricuspid  regurgitant jet is not obtainable. Pulmonary artery systolic pressure cannot  be assessed.     Pulmonic Valve  The valve leaflets are not well visualized. On Doppler interrogation, there is  no significant stenosis or regurgitation.     Vessels  The aorta root is normal. The thoracic aorta is normal. The inferior vena cava  was normal in size with preserved respiratory variability.     Pericardium  Prominent epicardial fat is noted. No pericardial effusion is present.     Miscellaneous  No significant valvular abnormalities present.     Compared to Previous Study  There is no prior study for direct comparison.  ______________________________________________________________________________  MMode/2D Measurements & Calculations  Ao root diam: 3.5 cm  asc Aorta Diam: 3.5 cm     EF(MOD-bp): 58.1 %  Ao root diam index Ht(cm/m): 2.3  Ao root diam index BSA (cm/m2): 1.9  Asc Ao diam index BSA (cm/m2): 1.9  Asc Ao diam index Ht(cm/m): 2.3  TAPSE: 2.1 cm     Doppler Measurements & Calculations  MV E max oswaldo: 51.0 cm/sec  MV A max oswaldo: 73.3 cm/sec  MV E/A: 0.70  MV dec time: 0.16 sec  E/E' av.1  Lateral E/e': 6.3     Medial E/e': 9.8  RV S Oswaldo: 11.1 cm/sec    Narrative & Impression   CT chest pulmonary angiogram with contrast     INDICATION: Chronic pulmonary embolus. CTEPH dual-energy protocol.  Pulmonary hypertension. Short of breath.     CONTRAST: 100 mL intravenous [id number]     COMPARISON: Archived chest CT images 2024.     FINDINGS: Right upper extremity injection. Main pulmonary artery  mildly enlarged approximately 3.5 cm. Nonenlarged right hilar lymph  nodes. Aorta  normal size. No evidence of pulmonary embolus. No  enlarged lymph nodes. No obviously abnormal breast tissues. Calcified  pleural plaques on the right which may be related to prior asbestos  exposure. No suspicious upper abdominal lesion. No pleural or  pericardial effusion.  Bone detail shows osteopenia with diffuse idiopathic skeletal  hyperostosis. Other degenerative changes throughout the spine.     Detail of the lungs both pleural-based apparent atelectasis in the  lingula anteriorly scarring/subsegmental atelectasis in the lateral  right lung base with bronchiectasis. Right upper lobe focal  subsegmental atelectasis also noted laterally and also in the right  middle lobe.  Blood volume mapping imaging shows no discrete suspicious defect to  indicate CTEPH, especially given the lack of embolic phenomenon.                                                                      IMPRESSION:  1. No evidence of pulmonary embolus.  2. Pulmonary artery enlargement which may indicate pulmonary  hypertension.  3. Calcified pleural plaques on the right which may indicate prior  asbestos exposure.  4. Degenerative changes and DISH in the thoracic spine.  5. No suspicious pulmonic lesions.        XAMINATION: US ABDOMEN LIMITED 6/27/2024 11:03 AM      HISTORY: R/O Portopulmonary HTN, Visualization of the Liver; Pulmonary  hypertension (H); SOB (shortness of breath); Chronic pulmonary  embolism without acute cor pulmonale, unspecified pulmonary embolism  type (H); Gastroesophageal reflux disease, unspecified whether  esophagitis present        TECHNIQUE: The abdomen was scanned in standard fashion with  specialized ultrasound transducer(s) using both gray-scale, color  Doppler, and spectral flow techniques.     COMPARISON: 4/14/2024 CT chest, same day CT chest     FINDINGS:   Fluid: No evidence of ascites or pleural effusions.     Liver: The liver demonstrates coarsened echotexture, measuring 17.5 cm  in craniocaudal dimension.  0.7 x 0.7 x 0.5 cm anechoic benign cyst.     Gallbladder: There is no wall thickening, pericholecystic fluid,  positive sonographic Singh's sign. 1.5 x 1.0 x 1.5 cm nonmobile, wall  based hypoechoic lesion likely representing sludge ball versus polyp  in the gallbladder neck. No vascularity on color Doppler. Multiple  shadowing gallstones with positive wall echo shadow sign.     Bile Ducts: Both the intra- and extrahepatic biliary system are of  normal caliber.  The common bile duct measures 5 mm in diameter.     Pancreas: Visualized portions of the head and body of the pancreas are  unremarkable.      Kidney: The right kidney measures 10.1 cm long. There is no  hydronephrosis or hydroureter, no shadowing renal calculi, cystic  lesion or mass.                                                                          IMPRESSION:  1. Cholelithiasis without evidence of cholecystitis.  2. Gallbladder wall lesion likely representing a sludge ball, however,  cannot exclude gallbladder polyp. Recommend six-month follow-up to  ensure stability.  3. Coarsened echotexture consistent with intrinsic hepatic parenchymal  disease.  4. Normal antegrade flow in the main portal vein.     I have personally reviewed the examination and initial interpretation  and I agree with the findings.     LENI PRIEST MD

## 2024-07-09 NOTE — NURSING NOTE
Chief Complaint   Patient presents with    Follow Up      Return PH F/U to review results of echo, CTPE, and liver US       Vitals were taken, medications reconciled.     Blanco Nathan, Visit Facilitator    12:22 PM

## 2024-07-09 NOTE — LETTER
7/9/2024      RE: José Luis Bocanegra  [address]  [facility] [address]       Dear Colleague,    Thank you for the opportunity to participate in the care of your patient, José Luis Bocanegra, at the [facility]. Please see a copy of my visit note below.      64 year old female seen for follow up of evaluation for PH    Plan:  VQ lung scan  Home oxygen 2L/minute with exertion  Hemoglobin A1C  Patient refuses right heart catheterization and/or coronary angiogram for multiple risk factors  Risks and benefits for procedure and or not doing it discussed in detail  Hemoglobin   A!C very high and needs to see primary care for correction  Non contrast CT liver for cirrhosis Hepatitis B +  May need to see Hepatology  Patient does not wish to be followed at [facility] secondary to distance  Will need to get oxygen from [place]    45 minutes with .      Current Outpatient Medications   Medication Sig Dispense Refill     acetaminophen (TYLENOL) 325 MG tablet Take 325-650 mg by mouth every 6 hours as needed for mild pain       allopurinol (ZYLOPRIM) 100 MG tablet Take 100 mg by mouth 2 times daily       atorvastatin (LIPITOR) 20 MG tablet Take 20 mg by mouth daily       colchicine (COLCRYS) 0.6 MG tablet Take 1 tablet by mouth daily at 2 pm       diclofenac (VOLTAREN) 1 % topical gel Apply 2 g topically 3 times daily as needed for moderate pain APPLY 2 GRAMS TO HAND AND FEET THREE TIMES A DAY AS NEEDED FOR PAIN.//PLEEV 2 GRAMS 3 ZAUG TXHUA HNUB YOG MOB       glipiZIDE (GLUCOTROL XL) 10 MG 24 hr tablet Take 10 mg by mouth daily       JARDIANCE 10 MG TABS tablet TAKE 1 TABLET DAILY FOR DIABETES // 1 HNUB NOJ 1 LUB PAB DILSHAD NTSHAV QAB ZIB       lisinopril (ZESTRIL) 20 MG tablet Take 20 mg by mouth daily TAKE 1 TABLET DAILY BY MOUTH FOR HIGH BLOOD PRESSURE // IB HNUB NOJ 1 LUB PAB DILSHAD NTSHAV SIAB       losartan (COZAAR) 50 MG tablet TAKE 1 TABLET DAILY BY  MOUTH FOR HIGH BLOOD PRESSURE // IB HNUB NOJ 1 LUB PAB DILSHAD NTSHAV SIAB       predniSONE (DELTASONE) 10 MG tablet PLEASE SEE ATTACHED FOR DETAILED DIRECTIONS       predniSONE (DELTASONE) 20 MG tablet PLEASE SEE ATTACHED FOR DETAILED DIRECTIONS       XARELTO ANTICOAGULANT 20 MG TABS tablet TAKE 1 TABLET BY MOUTH ONCE A DAY WITH EVENING MEAL AFTER YOU COMPLETE DVT PACK./IB HNUB NOJ 1 LUB NROG HMO RAWS LI QHIA.       Rivaroxaban ANTICOAGULANT 15 & 20 MG TBPK Starter Therapy Pack Take 15 mg by mouth 2 times daily (with meals) for 18 days, THEN 20 mg daily with food for 12 days. 51 each 0     No current facility-administered medications for this visit.       Latest Reference Range & Units 06/11/24 10:57   Sodium 135 - 145 mmol/L 141   Potassium 3.4 - 5.3 mmol/L 3.9   Chloride 98 - 107 mmol/L 106   Carbon Dioxide (CO2) 22 - 29 mmol/L 25   Urea Nitrogen 8.0 - 23.0 mg/dL 17.1   Creatinine 0.51 - 0.95 mg/dL 1.34 (H)   GFR Estimate >60 mL/min/1.73m2 44 (L)   Calcium 8.8 - 10.2 mg/dL 9.2   Anion Gap 7 - 15 mmol/L 10   Albumin 3.5 - 5.2 g/dL 3.8   Protein Total 6.4 - 8.3 g/dL 7.0   Alkaline Phosphatase 40 - 150 U/L 135   ALT 0 - 50 U/L 39   AST 0 - 45 U/L 52 (H)   Bilirubin Direct 0.00 - 0.30 mg/dL 0.21   Bilirubin Total <=1.2 mg/dL 0.8   Cholesterol <200 mg/dL 198   CRP Inflammation <5.00 mg/L 5.30 (H)   Patient Fasting?  No  No   Glucose 70 - 99 mg/dL 248 (H)   HDL Cholesterol >=50 mg/dL 42 (L)   Interleukin 6 Blood <3.01 pg/mL 6.08 (H)   Iron 37 - 145 ug/dL 56   Iron Binding Capacity 240 - 430 ug/dL 284   Iron Sat Index 15 - 46 % 20   LDL Cholesterol Calculated <=100 mg/dL 117 (H)   Non HDL Cholesterol <130 mg/dL 156 (H)   N-Terminal Pro Bnp 0 - 900 pg/mL 83   Rheumatoid Factor <14 IU/mL <10   Soluble Transferrin Receptor 1.9 - 4.4 mg/L 5.8 (H)   Triglycerides <150 mg/dL 193 (H)   TSH 0.30 - 4.20 uIU/mL 2.60   WBC 4.0 - 11.0 10e3/uL 6.3   Hemoglobin 11.7 - 15.7 g/dL 13.1   Hematocrit 35.0 - 47.0 % 42.4   Platelet Count 150 -  450 10e3/uL 155   RBC Count 3.80 - 5.20 10e6/uL 4.74   MCV 78 - 100 fL 90   MCH 26.5 - 33.0 pg 27.6   MCHC 31.5 - 36.5 g/dL 30.9 (L)   RDW 10.0 - 15.0 % 15.1 (H)     Wt Readings from Last 24 Encounters:   24 89.4 kg (197 lb)   24 89.1 kg (196 lb 6.4 oz)   24 86.2 kg (190 lb)   24 90.3 kg (199 lb)   01/10/23 (!) 191 kg (421 lb 1.3 oz)   01/10/23 87 kg (191 lb 12.8 oz)   22 82.3 kg (181 lb 7 oz)   20 82.3 kg (181 lb 6 oz)   19 80.1 kg (176 lb 8 oz)   18 81.2 kg (179 lb)   18 81 kg (178 lb 9.6 oz)        : 1960  Study Date: 2024 11:25 AM  Age: 64 yrs  Gender: Female  Patient Location: [facility]  Reason For Study: Pulmonary hypertension, SOB  Ordering Physician: FABIOLA JEAN  Referring Physician: FABIOLA JEAN  Performed By: Dahlia Fernández     BSA: 1.8 m2  Height: 59 in  Weight: 196 lb  HR: 79  BP: 148/100 mmHg  ______________________________________________________________________________  Procedure  Echocardiogram with two-dimensional, color and spectral Doppler performed.  Contrast Optison. Technically difficult study. Poor acoustic windows. Lumason  (NDC #1527-0257-39) given intravenously. IV start location RAC . Patient was  given 6 ml mixture of 3 ml Optison and 6 ml saline. 3 ml wasted.  ______________________________________________________________________________  Interpretation Summary  Technically difficult study. Poor acoustic windows.  Global and regional left ventricular function is normal with an EF of 55-60%.  Right ventricular wall thickness is 10mm (increased). Global right ventricular  function is borderline reduced.  No significant valvular abnormalities present.  The inferior vena cava was normal in size with preserved respiratory  variability.  No pericardial effusion is present.  There is no prior study for direct comparison.  ______________________________________________________________________________  Left  Ventricle  Global and regional left ventricular function is normal with an EF of 55-60%.  Biplane LVEF is 58%. Grade I or early diastolic dysfunction.     Right Ventricle  Right ventricular wall thickness is 10mm (increased). Global right ventricular  function is borderline reduced.     Atria  Both atria appear normal. Lipomatous infiltration of the interatrial septm is  present.     Mitral Valve  The mitral valve is normal. Trace mitral insufficiency is present.     Aortic Valve  The aortic valve is tricuspid. On Doppler interrogation, there is no  significant stenosis or regurgitation.     Tricuspid Valve  Trace tricuspid insufficiency is present. The peak velocity of the tricuspid  regurgitant jet is not obtainable. Pulmonary artery systolic pressure cannot  be assessed.     Pulmonic Valve  The valve leaflets are not well visualized. On Doppler interrogation, there is  no significant stenosis or regurgitation.     Vessels  The aorta root is normal. The thoracic aorta is normal. The inferior vena cava  was normal in size with preserved respiratory variability.     Pericardium  Prominent epicardial fat is noted. No pericardial effusion is present.     Miscellaneous  No significant valvular abnormalities present.     Compared to Previous Study  There is no prior study for direct comparison.  ______________________________________________________________________________  MMode/2D Measurements & Calculations  Ao root diam: 3.5 cm  asc Aorta Diam: 3.5 cm     EF(MOD-bp): 58.1 %  Ao root diam index Ht(cm/m): 2.3  Ao root diam index BSA (cm/m2): 1.9  Asc Ao diam index BSA (cm/m2): 1.9  Asc Ao diam index Ht(cm/m): 2.3  TAPSE: 2.1 cm     Doppler Measurements & Calculations  MV E max oswaldo: 51.0 cm/sec  MV A max oswaldo: 73.3 cm/sec  MV E/A: 0.70  MV dec time: 0.16 sec  E/E' av.1  Lateral E/e': 6.3     Medial E/e': 9.8  RV S Oswaldo: 11.1 cm/sec    Narrative & Impression   CT chest pulmonary angiogram with contrast     INDICATION:  Chronic pulmonary embolus. CTEPH dual-energy protocol.  Pulmonary hypertension. Short of breath.     CONTRAST: 100 mL intravenous [id number]     COMPARISON: Archived chest CT images 4/14/2024.     FINDINGS: Right upper extremity injection. Main pulmonary artery  mildly enlarged approximately 3.5 cm. Nonenlarged right hilar lymph  nodes. Aorta normal size. No evidence of pulmonary embolus. No  enlarged lymph nodes. No obviously abnormal breast tissues. Calcified  pleural plaques on the right which may be related to prior asbestos  exposure. No suspicious upper abdominal lesion. No pleural or  pericardial effusion.  Bone detail shows osteopenia with diffuse idiopathic skeletal  hyperostosis. Other degenerative changes throughout the spine.     Detail of the lungs both pleural-based apparent atelectasis in the  lingula anteriorly scarring/subsegmental atelectasis in the lateral  right lung base with bronchiectasis. Right upper lobe focal  subsegmental atelectasis also noted laterally and also in the right  middle lobe.  Blood volume mapping imaging shows no discrete suspicious defect to  indicate CTEPH, especially given the lack of embolic phenomenon.                                                                      IMPRESSION:  1. No evidence of pulmonary embolus.  2. Pulmonary artery enlargement which may indicate pulmonary  hypertension.  3. Calcified pleural plaques on the right which may indicate prior  asbestos exposure.  4. Degenerative changes and DISH in the thoracic spine.  5. No suspicious pulmonic lesions.        XAMINATION: US ABDOMEN LIMITED 6/27/2024 11:03 AM      HISTORY: R/O Portopulmonary HTN, Visualization of the Liver; Pulmonary  hypertension (H); SOB (shortness of breath); Chronic pulmonary  embolism without acute cor pulmonale, unspecified pulmonary embolism  type (H); Gastroesophageal reflux disease, unspecified whether  esophagitis present        TECHNIQUE: The abdomen was scanned in standard  fashion with  specialized ultrasound transducer(s) using both gray-scale, color  Doppler, and spectral flow techniques.     COMPARISON: 4/14/2024 CT chest, same day CT chest     FINDINGS:   Fluid: No evidence of ascites or pleural effusions.     Liver: The liver demonstrates coarsened echotexture, measuring 17.5 cm  in craniocaudal dimension. 0.7 x 0.7 x 0.5 cm anechoic benign cyst.     Gallbladder: There is no wall thickening, pericholecystic fluid,  positive sonographic Singh's sign. 1.5 x 1.0 x 1.5 cm nonmobile, wall  based hypoechoic lesion likely representing sludge ball versus polyp  in the gallbladder neck. No vascularity on color Doppler. Multiple  shadowing gallstones with positive wall echo shadow sign.     Bile Ducts: Both the intra- and extrahepatic biliary system are of  normal caliber.  The common bile duct measures 5 mm in diameter.     Pancreas: Visualized portions of the head and body of the pancreas are  unremarkable.      Kidney: The right kidney measures 10.1 cm long. There is no  hydronephrosis or hydroureter, no shadowing renal calculi, cystic  lesion or mass.                                                                          IMPRESSION:  1. Cholelithiasis without evidence of cholecystitis.  2. Gallbladder wall lesion likely representing a sludge ball, however,  cannot exclude gallbladder polyp. Recommend six-month follow-up to  ensure stability.  3. Coarsened echotexture consistent with intrinsic hepatic parenchymal  disease.  4. Normal antegrade flow in the main portal vein.     I have personally reviewed the examination and initial interpretation  and I agree with the findings.     LENI PRIEST MD                Please do not hesitate to contact me if you have any questions/concerns.     Sincerely,     Deon Virgen MD

## 2024-07-09 NOTE — PATIENT INSTRUCTIONS
You were seen today in the Pulmonary Hypertension Clinic at the [facility].     Cardiology Provider you saw during your visit:    Dr. Virgen    Medication Changes:  None    Follow-up:   If you wish to see us again, please call & schedule appointment.      Please call us immediately if you have any syncope (fainting or passing out), chest pain, edema (swelling or weight gain), or decline in your functional status (general decline in how you are feeling).    If you have emergent concerns after hours or on the weekend, please call our on-call Cardiologist at [phone], option [phone]. For emergencies call [phone].     Thank you for allowing us to be a part of your care here at the [facility]    If you have questions or concerns please contact us at:    Nina Collado RN (P: [phone])    Nurse Coordinator       Pulmonary Hypertension     [facility]         [facility]   (Prior Auths & Pt Assistance)   ()  Clinic   Clinic   Pulmonary Hypertension   Pulmonary Hypertension  [facility]  [facility]  (P)[phone]    (P) [phone]  (F) [phone]

## 2024-07-15 LAB
BI-PLANE LVEF ECHO: NORMAL
LVEF ECHO: NORMAL